# Patient Record
Sex: MALE | Race: WHITE | NOT HISPANIC OR LATINO | Employment: UNEMPLOYED | URBAN - METROPOLITAN AREA
[De-identification: names, ages, dates, MRNs, and addresses within clinical notes are randomized per-mention and may not be internally consistent; named-entity substitution may affect disease eponyms.]

---

## 2024-06-12 ENCOUNTER — APPOINTMENT (OUTPATIENT)
Dept: CT IMAGING | Facility: CLINIC | Age: 64
DRG: 064 | End: 2024-06-12
Attending: EMERGENCY MEDICINE
Payer: COMMERCIAL

## 2024-06-12 ENCOUNTER — HOSPITAL ENCOUNTER (INPATIENT)
Facility: CLINIC | Age: 64
LOS: 8 days | Discharge: NURSING, REHAB OR LONG-TERM CARE | DRG: 064 | End: 2024-06-20
Attending: EMERGENCY MEDICINE | Admitting: HOSPITALIST
Payer: COMMERCIAL

## 2024-06-12 DIAGNOSIS — R41.0 CONFUSION: ICD-10-CM

## 2024-06-12 DIAGNOSIS — I50.23 ACUTE ON CHRONIC SYSTOLIC HEART FAILURE (H): ICD-10-CM

## 2024-06-12 DIAGNOSIS — E74.39 GLUCOSE INTOLERANCE: ICD-10-CM

## 2024-06-12 DIAGNOSIS — I63.9 ACUTE CVA (CEREBROVASCULAR ACCIDENT) (H): ICD-10-CM

## 2024-06-12 DIAGNOSIS — I51.3 LV (LEFT VENTRICULAR) MURAL THROMBUS: ICD-10-CM

## 2024-06-12 DIAGNOSIS — E87.6 HYPOKALEMIA: ICD-10-CM

## 2024-06-12 DIAGNOSIS — I42.9 CARDIOMYOPATHY, UNSPECIFIED TYPE (H): Primary | ICD-10-CM

## 2024-06-12 DIAGNOSIS — H53.40 VISUAL FIELD CUT: ICD-10-CM

## 2024-06-12 DIAGNOSIS — R79.89 ELEVATED TROPONIN: ICD-10-CM

## 2024-06-12 LAB
ANION GAP SERPL CALCULATED.3IONS-SCNC: 17 MMOL/L (ref 7–15)
APTT PPP: 27 SECONDS (ref 22–38)
ATRIAL RATE - MUSE: 120 BPM
BASOPHILS # BLD AUTO: 0.1 10E3/UL (ref 0–0.2)
BASOPHILS NFR BLD AUTO: 0 %
BUN SERPL-MCNC: 21.2 MG/DL (ref 8–23)
CALCIUM SERPL-MCNC: 9.9 MG/DL (ref 8.8–10.2)
CHLORIDE SERPL-SCNC: 104 MMOL/L (ref 98–107)
CREAT SERPL-MCNC: 0.96 MG/DL (ref 0.67–1.17)
DEPRECATED HCO3 PLAS-SCNC: 23 MMOL/L (ref 22–29)
DIASTOLIC BLOOD PRESSURE - MUSE: NORMAL MMHG
EGFRCR SERPLBLD CKD-EPI 2021: 88 ML/MIN/1.73M2
EOSINOPHIL # BLD AUTO: 0 10E3/UL (ref 0–0.7)
EOSINOPHIL NFR BLD AUTO: 0 %
ERYTHROCYTE [DISTWIDTH] IN BLOOD BY AUTOMATED COUNT: 13.2 % (ref 10–15)
GLUCOSE BLDC GLUCOMTR-MCNC: 134 MG/DL (ref 70–99)
GLUCOSE BLDC GLUCOMTR-MCNC: 151 MG/DL (ref 70–99)
GLUCOSE BLDC GLUCOMTR-MCNC: 161 MG/DL (ref 70–99)
GLUCOSE SERPL-MCNC: 171 MG/DL (ref 70–99)
HBA1C MFR BLD: 6.4 %
HCT VFR BLD AUTO: 53.3 % (ref 40–53)
HGB BLD-MCNC: 17.3 G/DL (ref 13.3–17.7)
IMM GRANULOCYTES # BLD: 0 10E3/UL
IMM GRANULOCYTES NFR BLD: 0 %
INR PPP: 1.14 (ref 0.85–1.15)
INTERPRETATION ECG - MUSE: NORMAL
LYMPHOCYTES # BLD AUTO: 1.1 10E3/UL (ref 0.8–5.3)
LYMPHOCYTES NFR BLD AUTO: 10 %
MCH RBC QN AUTO: 31 PG (ref 26.5–33)
MCHC RBC AUTO-ENTMCNC: 32.5 G/DL (ref 31.5–36.5)
MCV RBC AUTO: 96 FL (ref 78–100)
MONOCYTES # BLD AUTO: 1.4 10E3/UL (ref 0–1.3)
MONOCYTES NFR BLD AUTO: 12 %
NEUTROPHILS # BLD AUTO: 8.8 10E3/UL (ref 1.6–8.3)
NEUTROPHILS NFR BLD AUTO: 77 %
NRBC # BLD AUTO: 0 10E3/UL
NRBC BLD AUTO-RTO: 0 /100
NT-PROBNP SERPL-MCNC: ABNORMAL PG/ML (ref 0–900)
P AXIS - MUSE: 64 DEGREES
PLATELET # BLD AUTO: 377 10E3/UL (ref 150–450)
POTASSIUM SERPL-SCNC: 3.8 MMOL/L (ref 3.4–5.3)
PR INTERVAL - MUSE: 152 MS
QRS DURATION - MUSE: 86 MS
QT - MUSE: 342 MS
QTC - MUSE: 483 MS
R AXIS - MUSE: 7 DEGREES
RBC # BLD AUTO: 5.58 10E6/UL (ref 4.4–5.9)
SODIUM SERPL-SCNC: 144 MMOL/L (ref 135–145)
SYSTOLIC BLOOD PRESSURE - MUSE: NORMAL MMHG
T AXIS - MUSE: 63 DEGREES
TROPONIN T SERPL HS-MCNC: 57 NG/L
TROPONIN T SERPL HS-MCNC: 78 NG/L
TROPONIN T SERPL HS-MCNC: 83 NG/L
TROPONIN T SERPL HS-MCNC: 83 NG/L
VENTRICULAR RATE- MUSE: 120 BPM
WBC # BLD AUTO: 11.4 10E3/UL (ref 4–11)

## 2024-06-12 PROCEDURE — 70450 CT HEAD/BRAIN W/O DYE: CPT

## 2024-06-12 PROCEDURE — 85730 THROMBOPLASTIN TIME PARTIAL: CPT | Performed by: EMERGENCY MEDICINE

## 2024-06-12 PROCEDURE — 0042T CT HEAD PERFUSION W CONTRAST: CPT

## 2024-06-12 PROCEDURE — 250N000012 HC RX MED GY IP 250 OP 636 PS 637: Performed by: PHYSICIAN ASSISTANT

## 2024-06-12 PROCEDURE — 80048 BASIC METABOLIC PNL TOTAL CA: CPT | Performed by: EMERGENCY MEDICINE

## 2024-06-12 PROCEDURE — 93005 ELECTROCARDIOGRAM TRACING: CPT

## 2024-06-12 PROCEDURE — 83036 HEMOGLOBIN GLYCOSYLATED A1C: CPT | Performed by: PHYSICIAN ASSISTANT

## 2024-06-12 PROCEDURE — 84484 ASSAY OF TROPONIN QUANT: CPT | Performed by: PHYSICIAN ASSISTANT

## 2024-06-12 PROCEDURE — 250N000013 HC RX MED GY IP 250 OP 250 PS 637: Performed by: EMERGENCY MEDICINE

## 2024-06-12 PROCEDURE — 120N000001 HC R&B MED SURG/OB

## 2024-06-12 PROCEDURE — 84484 ASSAY OF TROPONIN QUANT: CPT | Performed by: EMERGENCY MEDICINE

## 2024-06-12 PROCEDURE — 36415 COLL VENOUS BLD VENIPUNCTURE: CPT | Performed by: EMERGENCY MEDICINE

## 2024-06-12 PROCEDURE — 70496 CT ANGIOGRAPHY HEAD: CPT

## 2024-06-12 PROCEDURE — 99291 CRITICAL CARE FIRST HOUR: CPT | Mod: FS | Performed by: PSYCHIATRY & NEUROLOGY

## 2024-06-12 PROCEDURE — 85610 PROTHROMBIN TIME: CPT | Performed by: EMERGENCY MEDICINE

## 2024-06-12 PROCEDURE — 36415 COLL VENOUS BLD VENIPUNCTURE: CPT | Performed by: PHYSICIAN ASSISTANT

## 2024-06-12 PROCEDURE — 82962 GLUCOSE BLOOD TEST: CPT

## 2024-06-12 PROCEDURE — 85004 AUTOMATED DIFF WBC COUNT: CPT | Performed by: EMERGENCY MEDICINE

## 2024-06-12 PROCEDURE — 83880 ASSAY OF NATRIURETIC PEPTIDE: CPT | Performed by: EMERGENCY MEDICINE

## 2024-06-12 PROCEDURE — 250N000009 HC RX 250: Performed by: EMERGENCY MEDICINE

## 2024-06-12 PROCEDURE — 99292 CRITICAL CARE ADDL 30 MIN: CPT | Mod: FS | Performed by: PSYCHIATRY & NEUROLOGY

## 2024-06-12 PROCEDURE — 250N000011 HC RX IP 250 OP 636: Performed by: EMERGENCY MEDICINE

## 2024-06-12 PROCEDURE — 99291 CRITICAL CARE FIRST HOUR: CPT | Mod: 25

## 2024-06-12 PROCEDURE — 99223 1ST HOSP IP/OBS HIGH 75: CPT | Mod: AI | Performed by: PHYSICIAN ASSISTANT

## 2024-06-12 RX ORDER — CLOPIDOGREL 300 MG/1
300 TABLET, FILM COATED ORAL ONCE
Status: COMPLETED | OUTPATIENT
Start: 2024-06-12 | End: 2024-06-12

## 2024-06-12 RX ORDER — LIDOCAINE 40 MG/G
CREAM TOPICAL
Status: DISCONTINUED | OUTPATIENT
Start: 2024-06-12 | End: 2024-06-20 | Stop reason: HOSPADM

## 2024-06-12 RX ORDER — DEXTROSE MONOHYDRATE 25 G/50ML
25-50 INJECTION, SOLUTION INTRAVENOUS
Status: DISCONTINUED | OUTPATIENT
Start: 2024-06-12 | End: 2024-06-20 | Stop reason: HOSPADM

## 2024-06-12 RX ORDER — ONDANSETRON 2 MG/ML
4 INJECTION INTRAMUSCULAR; INTRAVENOUS EVERY 6 HOURS PRN
Status: DISCONTINUED | OUTPATIENT
Start: 2024-06-12 | End: 2024-06-20 | Stop reason: HOSPADM

## 2024-06-12 RX ORDER — HYDRALAZINE HYDROCHLORIDE 20 MG/ML
10-20 INJECTION INTRAMUSCULAR; INTRAVENOUS
Status: DISCONTINUED | OUTPATIENT
Start: 2024-06-12 | End: 2024-06-20 | Stop reason: HOSPADM

## 2024-06-12 RX ORDER — ASPIRIN 300 MG/1
300 SUPPOSITORY RECTAL DAILY
Status: DISCONTINUED | OUTPATIENT
Start: 2024-06-13 | End: 2024-06-13

## 2024-06-12 RX ORDER — ACETAMINOPHEN 650 MG/1
650 SUPPOSITORY RECTAL EVERY 4 HOURS PRN
Status: DISCONTINUED | OUTPATIENT
Start: 2024-06-12 | End: 2024-06-20 | Stop reason: HOSPADM

## 2024-06-12 RX ORDER — NICOTINE POLACRILEX 4 MG
15-30 LOZENGE BUCCAL
Status: DISCONTINUED | OUTPATIENT
Start: 2024-06-12 | End: 2024-06-20 | Stop reason: HOSPADM

## 2024-06-12 RX ORDER — CLOPIDOGREL BISULFATE 75 MG/1
75 TABLET ORAL DAILY
Status: DISCONTINUED | OUTPATIENT
Start: 2024-06-13 | End: 2024-06-13

## 2024-06-12 RX ORDER — LABETALOL HYDROCHLORIDE 5 MG/ML
10-20 INJECTION, SOLUTION INTRAVENOUS EVERY 10 MIN PRN
Status: DISCONTINUED | OUTPATIENT
Start: 2024-06-12 | End: 2024-06-20 | Stop reason: HOSPADM

## 2024-06-12 RX ORDER — ONDANSETRON 4 MG/1
4 TABLET, ORALLY DISINTEGRATING ORAL EVERY 6 HOURS PRN
Status: DISCONTINUED | OUTPATIENT
Start: 2024-06-12 | End: 2024-06-20 | Stop reason: HOSPADM

## 2024-06-12 RX ORDER — IOPAMIDOL 755 MG/ML
117 INJECTION, SOLUTION INTRAVASCULAR ONCE
Status: DISCONTINUED | OUTPATIENT
Start: 2024-06-12 | End: 2024-06-12

## 2024-06-12 RX ORDER — ASPIRIN 325 MG
325 TABLET, DELAYED RELEASE (ENTERIC COATED) ORAL DAILY
Status: DISCONTINUED | OUTPATIENT
Start: 2024-06-13 | End: 2024-06-13

## 2024-06-12 RX ORDER — ASPIRIN 81 MG/1
324 TABLET, CHEWABLE ORAL DAILY
Status: DISCONTINUED | OUTPATIENT
Start: 2024-06-13 | End: 2024-06-13

## 2024-06-12 RX ORDER — ASPIRIN 325 MG
325 TABLET ORAL ONCE
Status: COMPLETED | OUTPATIENT
Start: 2024-06-12 | End: 2024-06-12

## 2024-06-12 RX ADMIN — SODIUM CHLORIDE 100 ML: 9 INJECTION, SOLUTION INTRAVENOUS at 14:19

## 2024-06-12 RX ADMIN — IOPAMIDOL 117 ML: 755 INJECTION, SOLUTION INTRAVENOUS at 14:19

## 2024-06-12 RX ADMIN — ASPIRIN 325 MG ORAL TABLET 325 MG: 325 PILL ORAL at 16:09

## 2024-06-12 RX ADMIN — CLOPIDOGREL BISULFATE 300 MG: 300 TABLET, FILM COATED ORAL at 16:09

## 2024-06-12 RX ADMIN — INSULIN ASPART 1 UNITS: 100 INJECTION, SOLUTION INTRAVENOUS; SUBCUTANEOUS at 21:01

## 2024-06-12 ASSESSMENT — ACTIVITIES OF DAILY LIVING (ADL)
ADLS_ACUITY_SCORE: 21
ADLS_ACUITY_SCORE: 18
ADLS_ACUITY_SCORE: 35
ADLS_ACUITY_SCORE: 18
ADLS_ACUITY_SCORE: 21
ADLS_ACUITY_SCORE: 35
ADLS_ACUITY_SCORE: 25
ADLS_ACUITY_SCORE: 24
ADLS_ACUITY_SCORE: 35
ADLS_ACUITY_SCORE: 35

## 2024-06-12 ASSESSMENT — COLUMBIA-SUICIDE SEVERITY RATING SCALE - C-SSRS
6. HAVE YOU EVER DONE ANYTHING, STARTED TO DO ANYTHING, OR PREPARED TO DO ANYTHING TO END YOUR LIFE?: NO
1. IN THE PAST MONTH, HAVE YOU WISHED YOU WERE DEAD OR WISHED YOU COULD GO TO SLEEP AND NOT WAKE UP?: NO
2. HAVE YOU ACTUALLY HAD ANY THOUGHTS OF KILLING YOURSELF IN THE PAST MONTH?: NO

## 2024-06-12 NOTE — PLAN OF CARE
RECEIVING UNIT ED HANDOFF REVIEW    ED Nurse Handoff Report was reviewed by: Marcia Mares RN on June 12, 2024 at 5:03 PM

## 2024-06-12 NOTE — ED TRIAGE NOTES
Patient BIBA. Patient was found sitting in his car outside his home. Patient is an  and took a phone call from a client. Client who knows patient noted patient to be confused and call 911.     Unknown last known well. Bottle of benadryl with multiple tabs found in patient's car. Patient confused, unable to answer many questions and has right sided facial droop.

## 2024-06-12 NOTE — ED NOTES
[facility]  ED Nurse Handoff Report    ED Chief complaint: Altered Mental Status and Stroke Symptoms      ED Diagnosis:   Final diagnoses:   None       Code Status: Full Code    Allergies: No Known Allergies    Patient Story: Patient BIBA. Patient was found sitting in his car outside his home. Patient is an  and took a phone call from a client. Client who knows patient noted patient to be confused and call 911.      Unknown last known well. Bottle of benadryl with multiple tabs found in patient's car. Patient confused, unable to answer many questions and has right sided facial droop.   Focused Assessment:  slured speech    Treatments and/or interventions provided:   Labs Ordered and Resulted from Time of ED Arrival to Time of ED Departure   BASIC METABOLIC PANEL - Abnormal       Result Value    Sodium 144      Potassium 3.8      Chloride 104      Carbon Dioxide (CO2) 23      Anion Gap 17 (*)     Urea Nitrogen 21.2      Creatinine 0.96      GFR Estimate 88      Calcium 9.9      Glucose 171 (*)    TROPONIN T, HIGH SENSITIVITY - Abnormal    Troponin T, High Sensitivity 57 (*)    CBC WITH PLATELETS AND DIFFERENTIAL - Abnormal    WBC Count 11.4 (*)     RBC Count 5.58      Hemoglobin 17.3      Hematocrit 53.3 (*)     MCV 96      MCH 31.0      MCHC 32.5      RDW 13.2      Platelet Count 377      % Neutrophils 77      % Lymphocytes 10      % Monocytes 12      % Eosinophils 0      % Basophils 0      % Immature Granulocytes 0      NRBCs per 100 WBC 0      Absolute Neutrophils 8.8 (*)     Absolute Lymphocytes 1.1      Absolute Monocytes 1.4 (*)     Absolute Eosinophils 0.0      Absolute Basophils 0.1      Absolute Immature Granulocytes 0.0      Absolute NRBCs 0.0     GLUCOSE BY METER - Abnormal    GLUCOSE BY METER POCT 134 (*)    INR - Normal    INR 1.14     PARTIAL THROMBOPLASTIN TIME - Normal    aPTT 27     GLUCOSE MONITOR NURSING POCT      Medications   aspirin (ASA) tablet 325 mg (has  no administration in time range)   clopidogrel (PLAVIX) tablet 300 mg (has no administration in time range)   iopamidol (ISOVUE-370) solution 117 mL (117 mLs Intravenous $Given 6/12/24 7406)     And   sodium chloride 0.9 % bag for CT scan flush use (100 mLs As instructed $Given 6/12/24 1417)      CT Head Perfusion w Contrast - For Tier 2 Stroke   Final Result   IMPRESSION: There is a late arrival of the contrast bolus to the   posterolateral aspect of the left cerebral hemisphere involving   portions of the left temporal and left occipital lobes. This   corresponds to an area of decreased cerebral blood flow and decreased   cerebral blood volume suggesting infarct. There is questionable loss   of gray-white differentiation at the same location on head CT but head   CT is mildly limited by artifact. No other perfusion defects.         Radiation dose for this scan was reduced using automated exposure   control, adjustment of the mA and/or kV according to patient size, or   iterative reconstruction technique      JONATHON OBRIEN MD            SYSTEM ID:  [id number]      CTA Head Neck with Contrast   Final Result   Addendum (preliminary) 1 of 1   Addendum: After reviewing the perfusion images that demonstrate a   probable infarct at the temporo-occipital junction on the left, there   is decreased arborization of the distal M3 branches of the inferior   division of the left middle cerebral artery possibly representing   multiple thromboembolic occlusions.      This imaging study was discussed with the ordering provider, Dr. TAMMY VANESSA, by Dr. Obrien on 6/12/2024 2:45 PM.      JONATHON OBRIEN MD            SYSTEM ID:  [id number]      Final   IMPRESSION:   1. Age-indeterminate lack of flow in the left vertebral artery that is   of uncertain etiology. Acute occlusion cannot be excluded.   2. Mild atherosclerotic narrowing (less than 50% luminal diameter   stenosis) at the right internal carotid origin.   3. Otherwise,  normal neck and head CTA.         JONATHON ACOSTA MD            SYSTEM ID:  [id number]      CT Head w/o Contrast   Final Result   IMPRESSION: Diffuse cerebral volume loss and cerebral white matter   changes consistent with chronic small vessel ischemic disease. No   evidence for acute intracranial pathology.            JONATHON ACOSTA MD            SYSTEM ID:  [id number]         Patient's response to treatments and/or interventions: tolerated     To be done/followed up on inpatient unit:  na    Does this patient have any cognitive concerns?: Disoriented to time, Disoriented to place, Disoriented to situation, and Disorientation to person    Activity level - Baseline/Home:  Unknown  Activity Level - Current:   Unknown    Patient's Preferred language: English   Needed?: No    Isolation: None  Infection: Not Applicable  Patient tested for COVID 19 prior to admission: YES  Bariatric?: No    Vital Signs:   Vitals:    06/12/24 1406 06/12/24 1432 06/12/24 1442 06/12/24 1454   BP: (!) 190/129 (!) 169/122     Pulse: 118 117 112 113   Resp: 21 19 22 19   SpO2: 97% 95% 93% 96%   Weight: 127 kg (279 lb 15.8 oz)          Cardiac Rhythm:     Was the PSS-3 completed:   Yes  What interventions are required if any?               Family Comments:   OBS brochure/video discussed/provided to patient/family: Yes              Name of person given brochure if not patient:               Relationship to patient:     For the majority of the shift this patient's behavior was Green.   Behavioral interventions performed were .    ED NURSE PHONE NUMBER: [phone]

## 2024-06-12 NOTE — PROGRESS NOTES
"[facility]     Stroke Code Note          History of Present Illness     Chief Complaint: Altered Mental Status and Stroke Symptoms      Cirilo Gardner is a 63 year old male who we do not have known significant PMH for at this time. He was on the phone with someone this afternoon who noted him to be confused on the phone and notified EMS around 1330. In the ED he was noted to have mixed expressive/receptive language deficits and R visual field cut. On further questioning of the patient he is able to provide history that he woke up around 0400 feeling ok, around 0800 he felt that something wasn't right and by 1000 something was definitely not right. He is unable to further characterize what was feeling off.          Past Medical History     Stroke risk factors: unknown    Preadmission antithrombotic regimen: unknown    Modified Randolph Score (Pre-morbid)  0 - No symptoms.                   Assessment and Plan       1.  Suspected acute posterior L MCA infarct with symptoms of mixed expressive/receptive aphasia and R visual field cut     Intravenous Thrombolysis  Not given due to:   - unclear or unfavorable risk-benefit profile for extended window thrombolysis beyond the conventional 4.5 hour time window     Endovascular Treatment  Not initiated due to absence of proximal vessel occlusion     Plan:  - Use orderset: \"Ischemic Stroke Routine Admission\" or \"Ischemic Stroke No Thrombolytics/No Thrombectomy ICU Admission\"  - Place Neurology IP Stroke Consult order   - Neurochecks and Vital Signs every 4 hours   - Permissive HTN; goal SBP < 220 mmHg  - Load with DAPT now - aspirin 325 mg + Plavix 300 mg, start plavix 75 mg + aspirin 325 mg for secondary stroke prevention tomorrow  - Statin: atorvastatin 40 mg, adjustment pending LDL  - MRI Brain with and without contrast   - TTE  - Carotid ultrasound  - Telemetry, EKG  - Bedside Glucose Monitoring  - Nutrition: nursing to document bedside " "swallow prior to allowing oral intake  - A1c, Lipid Panel, Troponin x 3  - PT/OT/SLP  - Stroke Education  - Depression Screen  - Apnea screening questions  - Euthermia, Euglycemia     ___________________________________________________________________    Radha MAMI Haney  Vascular Neurology    To page me or covering stroke neurology team member, click here: [facility]  Choose \"On Call\" tab at top, then select \"NEUROLOGY/ALL SITES\" from middle drop-down box, press Enter, then look for \"stroke\" or \"telestroke\" for your site.  ___________________________________________________________________        Imaging/Labs   (personally reviewed CT, CTA, CTP)    CT head: Diffuse cerebral volume loss and cerebral white matter  changes consistent with chronic small vessel ischemic disease. No  evidence for acute intracranial pathology.    CTA head/neck: 1. Age-indeterminate lack of flow in the left vertebral artery that is  of uncertain etiology. Acute occlusion cannot be excluded.  2. Mild atherosclerotic narrowing (less than 50% luminal diameter  stenosis) at the right internal carotid origin. Soft plaque L ICA with mild stenosis.    CT Perfusion head: There is a late arrival of the contrast bolus to the  posterolateral aspect of the left cerebral hemisphere involving  portions of the left temporal and left occipital lobes. This  corresponds to an area of decreased cerebral blood flow and decreased  cerebral blood volume suggesting infarct. There is questionable loss  of gray-white differentiation at the same location on head CT but head  CT is mildly limited by artifact. No other perfusion defects.         Physical Examination     BP: (!) 169/122   Pulse: 113   Resp: 19           SpO2: 96 %       Weight: 127 kg (279 lb 15.8 oz)    Wt Readings from Last 2 Encounters:   06/12/24 127 kg (279 lb 15.8 oz)       General Exam  General:  patient lying in bed without any acute distress    HEENT:  normocephalic/atraumatic  Pulmonary:  no " respiratory distress    Neuro Exam  Mental Status:   alert, difficult to assess orientation due to aphasia but seems to understand he is in the hospital, unable to tell me the date/month, mixed expressive/receptive aphasia, difficulty with naming and repetition  Cranial Nerves:  PERRL, EOMI with normal smooth pursuit, facial sensation intact and symmetric, facial movements symmetric, hearing not formally tested but intact to conversation, no dysarthria, tongue protrusion midline, R visual field cut  Motor:  normal muscle tone and bulk, no abnormal movements, able to move all limbs spontaneously, strength 5/5 throughout upper and lower extremities, no pronator drift  Sensory:  light touch sensation intact and symmetric throughout upper and lower extremities, no extinction on double simultaneous stimulation   Coordination:  normal finger-to-nose and heel-to-shin bilaterally without dysmetria, rapid alternating movements symmetric  Station/Gait:  deferred        Stroke Scales       NIHSS  1a. Level of Consciousness 0-->Alert, keenly responsive   1b. LOC Questions 2-->Answers neither question correctly   1c. LOC Commands 1-->Performs one task correctly   2.   Best Gaze 0-->Normal   3.   Visual 2-->Complete hemianopia   4.   Facial Palsy 0-->Normal symmetrical movements   5a. Motor Arm, Left 0-->No drift, limb holds 90 (or 45) degrees for full 10 secs   5b. Motor Arm, Right 0-->No drift, limb holds 90 (or 45) degrees for full 10 secs   6a. Motor Leg, Left 0-->No drift, leg holds 30 degree position for full 5 secs   6b. Motor Leg, right 0-->No drift, leg holds 30 degree position for full 5 secs   7.   Limb Ataxia 0-->Absent   8.   Sensory 0-->Normal, no sensory loss   9.   Best Language 2-->Severe aphasia, all communication is through fragmentary expression, great need for inference, questioning, and guessing by the listener. Range of information that can be exchanged is limited, listener carries burden of. . . (see row  details)   10. Dysarthria 0-->Normal   11. Extinction and Inattention  0-->No abnormality   Total 7 (06/12/24 1503)            Labs     CBC  Lab Results   Component Value Date    HGB 17.3 06/12/2024    HCT 53.3 (H) 06/12/2024    WBC 11.4 (H) 06/12/2024     06/12/2024       BMP  Lab Results   Component Value Date     06/12/2024    POTASSIUM 3.8 06/12/2024    CHLORIDE 104 06/12/2024    CO2 23 06/12/2024    BUN 21.2 06/12/2024    CR 0.96 06/12/2024     (H) 06/12/2024    PARISH 9.9 06/12/2024       INR  INR   Date Value Ref Range Status   06/12/2024 1.14 0.85 - 1.15 Final       Data   Stroke Code Data  (for stroke code without tele)  Stroke code activated 06/12/24  1402   First stroke provider response 06/12/24  1403   Last known normal 06/12/24  0400   Time of discovery (or onset of symptoms) 06/12/24  1330   Head CT read by Stroke Neuro Provider 06/12/24  1417   Was stroke code de-escalated? Yes  06/12/24  1440        Clinically Significant Risk Factors Present on Admission                                        Time Spent on this Encounter   Billing: I personally examined and evaluated the patient today. At the time of my evaluation and management the patient was critical condition today due to stroke code. I personally managed history, exam, chart and imaging review, discussion with patient/care team. Key decisions made today included no acute stroke treatment, admit for stroke eval. I spent a total of 70 minutes providing critical care services, evaluating the patient, directing care and reviewing laboratory values and radiologic reports.

## 2024-06-12 NOTE — ED NOTES
[facility]  ED Nurse Handoff Report    ED Chief complaint: Altered Mental Status and Stroke Symptoms      ED Diagnosis:   Final diagnoses:   Acute CVA (cerebrovascular accident) (H)   Confusion   Visual field cut - Right side   Elevated troponin       Code Status: Full Code    Allergies: No Known Allergies    Patient Story: Cirilo Gardner is a 63 year old male who presents to the emergency department for evaluation of Altered mental status. EMS reports a contractor calling in the patient after a brief phone call where he noticed the patient speaking differently. Upon arrival, They found the patient inside his car, outside his home, and unable to identify his identity or address. They measured his blood glucose at 142, blood pressure 170/100, and heart rate 117 bpm. The patient denies any headaches. Of note the patient lives alone and denies taking any medications or having a medical history.  The patient's history is very sketchy because of his memory loss and confusion.  We have no medical records on this patient.   Focused Assessment:    Labs Ordered and Resulted from Time of ED Arrival to Time of ED Departure   BASIC METABOLIC PANEL - Abnormal       Result Value    Sodium 144      Potassium 3.8      Chloride 104      Carbon Dioxide (CO2) 23      Anion Gap 17 (*)     Urea Nitrogen 21.2      Creatinine 0.96      GFR Estimate 88      Calcium 9.9      Glucose 171 (*)    TROPONIN T, HIGH SENSITIVITY - Abnormal    Troponin T, High Sensitivity 57 (*)    CBC WITH PLATELETS AND DIFFERENTIAL - Abnormal    WBC Count 11.4 (*)     RBC Count 5.58      Hemoglobin 17.3      Hematocrit 53.3 (*)     MCV 96      MCH 31.0      MCHC 32.5      RDW 13.2      Platelet Count 377      % Neutrophils 77      % Lymphocytes 10      % Monocytes 12      % Eosinophils 0      % Basophils 0      % Immature Granulocytes 0      NRBCs per 100 WBC 0      Absolute Neutrophils 8.8 (*)     Absolute Lymphocytes 1.1      Absolute  Monocytes 1.4 (*)     Absolute Eosinophils 0.0      Absolute Basophils 0.1      Absolute Immature Granulocytes 0.0      Absolute NRBCs 0.0     GLUCOSE BY METER - Abnormal    GLUCOSE BY METER POCT 134 (*)    NT PROBNP INPATIENT - Abnormal    N terminal Pro BNP Inpatient 12,210 (*)    INR - Normal    INR 1.14     PARTIAL THROMBOPLASTIN TIME - Normal    aPTT 27     GLUCOSE MONITOR NURSING POCT     Medications   iopamidol (ISOVUE-370) solution 117 mL (117 mLs Intravenous $Given 6/12/24 2029)     And   sodium chloride 0.9 % bag for CT scan flush use (100 mLs As instructed $Given 6/12/24 1419)   aspirin (ASA) tablet 325 mg (325 mg Oral $Given 6/12/24 1609)   clopidogrel (PLAVIX) tablet 300 mg (300 mg Oral $Given 6/12/24 1609)     CT Head Perfusion w Contrast - For Tier 2 Stroke   Final Result   IMPRESSION: There is a late arrival of the contrast bolus to the   posterolateral aspect of the left cerebral hemisphere involving   portions of the left temporal and left occipital lobes. This   corresponds to an area of decreased cerebral blood flow and decreased   cerebral blood volume suggesting infarct. There is questionable loss   of gray-white differentiation at the same location on head CT but head   CT is mildly limited by artifact. No other perfusion defects.         Radiation dose for this scan was reduced using automated exposure   control, adjustment of the mA and/or kV according to patient size, or   iterative reconstruction technique      JONATHON ACOSTA MD            SYSTEM ID:  [id number]      CTA Head Neck with Contrast   Final Result   Addendum (preliminary) 1 of 1   Addendum: After reviewing the perfusion images that demonstrate a   probable infarct at the temporo-occipital junction on the left, there   is decreased arborization of the distal M3 branches of the inferior   division of the left middle cerebral artery possibly representing   multiple thromboembolic occlusions.      This imaging study was discussed  with the ordering provider, Dr. TAMMY VANESSA, by Dr. Obrien on 6/12/2024 2:45 PM.      JONATHON OBRIEN MD            SYSTEM ID:  [id number]      Final   IMPRESSION:   1. Age-indeterminate lack of flow in the left vertebral artery that is   of uncertain etiology. Acute occlusion cannot be excluded.   2. Mild atherosclerotic narrowing (less than 50% luminal diameter   stenosis) at the right internal carotid origin.   3. Otherwise, normal neck and head CTA.         JONATHON OBRIEN MD            SYSTEM ID:  [id number]      CT Head w/o Contrast   Final Result   IMPRESSION: Diffuse cerebral volume loss and cerebral white matter   changes consistent with chronic small vessel ischemic disease. No   evidence for acute intracranial pathology.            JONATHON OBRIEN MD            SYSTEM ID:  [id number]                Does this patient have any cognitive concerns?: Disoriented to time, Disoriented to place, Disoriented to situation, and Disorientation to person    Activity level - Baseline/Home:  Independent  Activity Level - Current:   Stand with assist x2    Patient's Preferred language: English   Needed?: No    Isolation: None  Infection: Not Applicable  Patient tested for COVID 19 prior to admission: none  Bariatric?: No    Vital Signs:   Vitals:    06/12/24 1432 06/12/24 1442 06/12/24 1454 06/12/24 1600   BP: (!) 169/122   (!) 161/117   Pulse: 117 112 113 112   Resp: 19 22 19 13   SpO2: 95% 93% 96% 93%   Weight:           Cardiac Rhythm:     Was the PSS-3 completed:   Yes  What interventions are required if any?               Family Comments: none      For the majority of the shift this patient's behavior was Green.   Behavioral interventions performed were none.    ED NURSE PHONE NUMBER: [id number]

## 2024-06-12 NOTE — ED PROVIDER NOTES
Emergency Department Note      History of Present Illness     Chief Complaint  Altered Mental Status and Stroke Symptoms    HPI  Cirilo Gardner is a 63 year old male who presents to the emergency department for evaluation of Altered mental status. EMS reports a contractor calling in the patient after a brief phone call where he noticed the patient speaking differently. Upon arrival, They found the patient inside his car, outside his home, and unable to identify his identity or address. They measured his blood glucose at 142, blood pressure 170/100, and heart rate 117 bpm. The patient denies any headaches. Of note the patient lives alone and denies taking any medications or having a medical history.  The patient's history is very sketchy because of his memory loss and confusion.  We have no medical records on this patient.    Independent Historian  EMS as above     Review of External Notes  none  Past Medical History   Medical History and Problem List  No past medical history on file.    Medications  No current outpatient medications on file.      Surgical History   No past surgical history on file.  Physical Exam   Patient Vitals for the past 24 hrs:   BP Pulse Resp SpO2 Weight   06/12/24 1600 (!) 161/117 112 13 93 % --   06/12/24 1454 -- 113 19 96 % --   06/12/24 1442 -- 112 22 93 % --   06/12/24 1432 (!) 169/122 117 19 95 % --   06/12/24 1406 (!) 190/129 118 21 97 % 127 kg (279 lb 15.8 oz)     Physical Exam  Nursing note and vitals reviewed.    Constitutional:  Appears comfortable but confused  HENT:    Nose normal.  No discharge.      Oral mucosa is moist.  Eyes:    Conjunctivae are normal without injection.  Pupils are equal.  Cardiovascular:  Normal rate, regular rhythm with normal S1 and S2.      Normal heart sounds and peripheral pulses 2+ and equal.    Pulmonary:  Effort normal and breath sounds clear to auscultation bilaterally.     No respiratory distress.  No stridor.     No wheezes. No rales.      GI:    Soft. No distension and no mass. No tenderness.   Musculoskeletal:  Normal range of motion. No extremity deformity.     He does have 1+ bilateral lower extremity edema.  Neurological:   GCS 15. NIH stroke scale score 5. O X 1 but it took him a while to get his name out.  No sensory deficit. Normal strength in all extremities. No hand drift. No leg drift.   Visual fields reveal what appears to be a visual field defect on the right lateral visual field.  When he looks around he turns his head to try to look to the right rather than moving his eyes to the right which she can do but there seems to be a little more left deviation.. No diplopia.  Mild right facial droop. No slurred speech.   Speech is normal but he is confused.  Skin:    Skin is warm and dry. No rash noted. No diaphoresis.      No erythema. No pallor.  No lesions.  Psychiatric:   Behavior is normal. Appropriate mood and affect.     Judgment and thought content normal.   .  Diagnostics   Lab Results   Labs Ordered and Resulted from Time of ED Arrival to Time of ED Departure   BASIC METABOLIC PANEL - Abnormal       Result Value    Sodium 144      Potassium 3.8      Chloride 104      Carbon Dioxide (CO2) 23      Anion Gap 17 (*)     Urea Nitrogen 21.2      Creatinine 0.96      GFR Estimate 88      Calcium 9.9      Glucose 171 (*)    TROPONIN T, HIGH SENSITIVITY - Abnormal    Troponin T, High Sensitivity 57 (*)    CBC WITH PLATELETS AND DIFFERENTIAL - Abnormal    WBC Count 11.4 (*)     RBC Count 5.58      Hemoglobin 17.3      Hematocrit 53.3 (*)     MCV 96      MCH 31.0      MCHC 32.5      RDW 13.2      Platelet Count 377      % Neutrophils 77      % Lymphocytes 10      % Monocytes 12      % Eosinophils 0      % Basophils 0      % Immature Granulocytes 0      NRBCs per 100 WBC 0      Absolute Neutrophils 8.8 (*)     Absolute Lymphocytes 1.1      Absolute Monocytes 1.4 (*)     Absolute Eosinophils 0.0      Absolute Basophils 0.1      Absolute Immature  Granulocytes 0.0      Absolute NRBCs 0.0     GLUCOSE BY METER - Abnormal    GLUCOSE BY METER POCT 134 (*)    NT PROBNP INPATIENT - Abnormal    N terminal Pro BNP Inpatient 12,210 (*)    INR - Normal    INR 1.14     PARTIAL THROMBOPLASTIN TIME - Normal    aPTT 27     GLUCOSE MONITOR NURSING POCT     Imaging  CT Head Perfusion w Contrast - For Tier 2 Stroke   Final Result   IMPRESSION: There is a late arrival of the contrast bolus to the   posterolateral aspect of the left cerebral hemisphere involving   portions of the left temporal and left occipital lobes. This   corresponds to an area of decreased cerebral blood flow and decreased   cerebral blood volume suggesting infarct. There is questionable loss   of gray-white differentiation at the same location on head CT but head   CT is mildly limited by artifact. No other perfusion defects.         Radiation dose for this scan was reduced using automated exposure   control, adjustment of the mA and/or kV according to patient size, or   iterative reconstruction technique      JONATHON OBRIEN MD            SYSTEM ID:  [id number]      CTA Head Neck with Contrast   Final Result   Addendum (preliminary) 1 of 1   Addendum: After reviewing the perfusion images that demonstrate a   probable infarct at the temporo-occipital junction on the left, there   is decreased arborization of the distal M3 branches of the inferior   division of the left middle cerebral artery possibly representing   multiple thromboembolic occlusions.      This imaging study was discussed with the ordering provider, Dr. TAMMY VANESSA, by Dr. Obrien on 6/12/2024 2:45 PM.      JONATHON OBRIEN MD            SYSTEM ID:  [id number]      Final   IMPRESSION:   1. Age-indeterminate lack of flow in the left vertebral artery that is   of uncertain etiology. Acute occlusion cannot be excluded.   2. Mild atherosclerotic narrowing (less than 50% luminal diameter   stenosis) at the right internal carotid origin.   3.  Otherwise, normal neck and head CTA.         JONATHON ACOSTA MD            SYSTEM ID:  [id number]      CT Head w/o Contrast   Final Result   IMPRESSION: Diffuse cerebral volume loss and cerebral white matter   changes consistent with chronic small vessel ischemic disease. No   evidence for acute intracranial pathology.            JONATHON ACOSTA MD            SYSTEM ID:  [id number]        EKG   ECG taken at 1403, ECG read at 1405  Sinus tachycardia with occasional premature ventricular complexes  Possible left atrial enlargement   Nonspecific t wave abnormality    Rate 120 bpm. MT interval 152 ms. QRS duration 86 ms. QT/QTc 342/483 ms. P-R-T axes 64 7 63.    Independent Interpretation  None  ED Course    Medications Administered  Medications   iopamidol (ISOVUE-370) solution 117 mL (117 mLs Intravenous $Given 6/12/24 1419)     And   sodium chloride 0.9 % bag for CT scan flush use (100 mLs As instructed $Given 6/12/24 1419)   aspirin (ASA) tablet 325 mg (325 mg Oral $Given 6/12/24 1609)   clopidogrel (PLAVIX) tablet 300 mg (300 mg Oral $Given 6/12/24 1609)       Procedures  Procedures     Discussion of Management  Admitting Hospitalist, Dr. Saavedra  Neurology, 1406 Radha Haney PA-C     Social Determinants of Health adding to complexity of care  None    ED Course  ED Course as of 06/12/24 1625   Wed Jun 12, 2024   1355 I obtained history and examined the patient as noted above     1400 I called a tier two stroke   1403 I spoke with stroke neuro about the patient     Medical Decision Making / Diagnosis   CMS Diagnoses: The patient has stroke symptoms:         ED Stroke specific documentation           NIHSS PDF     Patient last known well time: unknown  ED Provider first to bedside at: 1355  CT Results received at: 1415    Thrombolytics:   Not given due to: Outside the window.      If treating with thrombolytics: Ensure SBP<180 and DBP<105 prior to treatment with thrombolytics.  Administering thrombolytics after treatment  with IV labetalol, hydralazine, or nicardipine is reasonable once BP control is established.    Endovascular Retrieval:  Not initiated due to absence of proximal vessel occlusion    [employer] Stroke Scale (Baseline)  Time Performed: 1405     Score    Level of consciousness: (0)   Alert, keenly responsive    LOC questions: (1)   Answers one question correctly    LOC commands: (0)   Performs both tasks correctly    Best gaze: (1)   Partial gaze palsy    Visual: (1)   Partial hemianopia    Facial palsy: (1)   Minor paralysis (flat nasolabial fold, smile asymmetry)    Motor arm (left): (0)   No drift    Motor arm (right): (0)   No drift    Motor leg (left): (0)   No drift    Motor leg (right): (0)   No drift    Limb ataxia: (0)   Absent    Sensory: (0)   Normal- no sensory loss    Best language: (0)   Normal- no aphasia    Dysarthria: (0)   Normal    Extinction and inattention: (1)   Visual, tacile, auditory, spatial, person inattention        Total Score:  5        Stroke Mimics were considered (including migraine headache, seizure disorder, hypoglycemia (or hyperglycemia), head or spinal trauma, CNS infection, Toxin ingestion and shock state (e.g. sepsis) .      MIPS     None    MDM  Cirilo Gardner is a 63 year old male who was found in his car confused and brought into the ER.  It is unclear when he was last known well, I thought there was a slight facial droop on the right and he seemed to have a little bit of a visual field defect in the right lateral visual field.  I called a tier 2 code stroke because it sounds like he may have been fine last night.  CT and CTA showed evidence of a perfusion defect and possible acute or subacute stroke in the left hemisphere and there is what appears to be severe disease in the left carotid.  The patient was outside the window and did not have a major vessel occlusion that could be amenable to thrombectomy.  Stroke neuro was here and they recommended  Plavix and aspirin and will get an MRI.  He will need carotid ultrasounds as well to quantify the amount of blockage in the left carotid.  Cardiac echo as well.  We do not have any medical history on this patient although he says he is not on any medications.  His troponin was up at 57 and his BNP is quite high at 12,210.  He does have significant 1+ leg edema, electrolytes were normal white count was elevated slightly at 11.4 but the rest of the CBC was unremarkable.  His blood sugar was 134.  He was in sinus rhythm on EKG.  This patient will be admitted to neuro telemetry for with further workup per stroke neuro and hospitalist which was Dr. Saavedra.  He will need further evaluation for the elevated BNP and troponin with serial troponins and a cardiac echo should be helpful as well.  He will likely need diuresis.  Blood pressure is high but will allow permissive hypertension.    Critical care time minus procedures: 50 minutes    Disposition  The patient was admitted to the hospital.     ICD-10 Codes:    ICD-10-CM    1. Acute CVA (cerebrovascular accident) (H)  I63.9       2. Confusion  R41.0       3. Visual field cut  H53.40     Right side      4. Elevated troponin  R79.89            Scribe Disclosure:  I, Lori Philippe, am serving as a scribe at 2:14 PM on 6/12/2024 to document services personally performed by Alicia Gallagher MD based on my observations and the provider's statements to me.     Scribe Disclosure:  I, Kristie Bailey, am serving as a scribe  at 3:39 PM on 6/12/2024 to document services personally performed by Alicia Gallagher MD based on my observations and the provider's statements to me.       Alicia Gallagher MD  06/12/24 3160

## 2024-06-12 NOTE — H&P
[facility]  History and Physical - Hospitalist Service       Date of Admission:  6/12/2024  PRIMARY CARE PROVIDER:    No primary care provider on file.    Assessment & Plan   Cirilo Gardner is a 63 year old male admitted on 6/12/2024 due to suspected acute posterior left MCA infarction with symptoms of mixed expressive/receptive aphasia and right visual filed cut.      No significant past medical history.      Discussed with Dr. Gallagher and reviewed ED notes.  Patient presented to the ED by ambulance due to altered mental status.  Patient was found sitting in his car outside of his home.  He had happened to be on the phone speaking with a client who became concerned as the patient seemed confused.  The client called 911.      In the ED the patient was noted to have expressive/receptive deficits and right visual field cut.  Code stroke was called.      Work-up in the ED included an EKG that showed sinus tachycardia with occasional PVCs and possible left atrial enlargement.  POCT glucose was 134.  BMP revealed an anion gap of 17 and glucose of 171 otherwise within normal limits.  CBC with differential revealed a WBC of 11.4, hematocrit of 53.3, absolute monocytes of 1.4, absolute neutrophils of 8.8 otherwise within normal limits.  INR was within normal limits at 1.14 as well as PTT at 27.  High-sensitivity troponin T was elevated at 57.      Imaging studies completed in the ED:  Head CT without contrast revealed diffuse cerebral volume loss and cerebral white matter changes consistent with chronic small vessel ischemic disease and no evidence of acute intracranial pathology.  Head/neck CTA with contrast revealed age indeterminate lack of flow in the left vertebral artery that is of uncertain etiology (acute occlusion cannot be excluded) and mild atherosclerotic narrowing (less than 50% luminal diameter stenosis) at the right internal carotid origin with subsequent addendum added  indicating probable infarct of the tempo-occipital junction on the left (decreased able ration of the distal M3 branches of the inferior division of the left middle cerebral artery possibly representing multiple thromboembolic occlusions).  Head perfusion CT study with contrast revealed a late arrival of the contrast bolus to the posterior lateral aspect of the left cerebral hemisphere involving portions of the left temporal and left occipital lobes (corresponds to an area of decreased verbal blood flow and decrease cerebral blood volume suggesting infarction), questionable loss of gray-white differentiation at the same location on head CT but head CT is mildly limited due to artifact otherwise no other perfusion defects noted.    Suspected acute posterior left MCA infarction   Mixed expressive/receptive aphasia  Right visual filed cut  - Stroke Neurology consult requested.  - Vital signs and neuro checks every 4 hours.    - Brain MRI w and w/o contrast.    - Monitor on telemetry.    - ECHO w or w/o bubble study.    - Trend troponin.    - Check A1c and Lipid panel.    - NPO; can advance diet if patient passes bedside dysphagia screen.    - PT/OT/SLP consults requested.    - SW/CM consult requested.    - Allow for permissive hypertension (goal SBP LESS THAN 220) for the next 24 hours.    - PRN IV antihypertensives available (hydralazine, labetalol).  - Loading dose of ASA (325 mg) and Plavix (300 mg) ordered in the ED.   --Will start daily  mg/d and Plavix 75 mg/d in the AM of 6/13.    - Atorvastatin 40 mg at bedtime ordered to start the evening of 6/13.      Troponin elevation  *Suspect secondary to demand due to acute CVA.  - Trend troponin.    - Monitor on telemetry.    - ECHO ordered.      Bilateral pitting lower extremity edema  Elevated ProBNP  *ProBNP resulted after assessment of the patient and elevated at 12,210.    *When asked, the patient reported that the swelling of the legs and feet began 3-4  days prior to presentation.    - ECHO.    - Currently allowing for permissive HTN; will await ECHO results and potentially start diuresis.      Leukocytosis, mild  *Suspect secondary to stress demargination.    - CBC ordered for the AM.      Hyperglycemia  - Check A1c.    - Medium sliding scale insulin every 4 hours while NPO.    - Hypoglycemic protocol in place.      Clinically Significant Risk Factors Present on Admission          Diet: NPO for Medical/Clinical Reasons Except for: No Exceptions    DVT Prophylaxis: Pneumatic Compression Devices  Ravi Catheter: Not present  Lines: None     Cardiac Monitoring: Ordered  Code Status: FULL CODE         Disposition Plan   Inpatient status.  Anticipate greater than 2 evening hospitalization while undergoing continued work-up/management of suspected acute CVA, elevated troponin, bilateral LE edema with elevated ProBNP.      Medically Ready for Discharge: Anticipated in 2-4 Days    The patient's care was discussed with the Patient and Dr. Gallagher .    The patient has been discussed with Dr. Saavedra, who agrees with the assessment and plan at this time.    Cameron Frederick PA-C  [facility]  Securely message with the Vocera Web Console (learn more here)    ______________________________________________________________________    Chief Complaint   Altered mental status    History is obtained from Dr. Gallagher, the patient and EMR.      History of Present Illness   Cirilo Gardner is a 63 year old male admitted on 6/12/2024 due to suspected acute posterior left MCA infarction with symptoms of mixed expressive/receptive aphasia and right visual filed cut.      No significant past medical history.      Discussed with Dr. Gallagher and reviewed ED notes.  Patient presented to the ED by ambulance due to altered mental status.  Patient was found sitting in his car outside of his home.  He had happened to be on the phone speaking with a client who  became concerned as the patient seemed confused.  The client called 911.      In the ED the patient was noted to have expressive/receptive deficits and right visual field cut.  Code stroke was called.      Work-up in the ED included an EKG that showed sinus tachycardia with occasional PVCs and possible left atrial enlargement.  POCT glucose was 134.  BMP revealed an anion gap of 17 and glucose of 171 otherwise within normal limits.  CBC with differential revealed a WBC of 11.4, hematocrit of 53.3, absolute monocytes of 1.4, absolute neutrophils of 8.8 otherwise within normal limits.  INR was within normal limits at 1.14 as well as PTT at 27.  High-sensitivity troponin T was elevated at 57.      Imaging studies completed in the ED:  Head CT without contrast revealed diffuse cerebral volume loss and cerebral white matter changes consistent with chronic small vessel ischemic disease and no evidence of acute intracranial pathology.  Head/neck CTA with contrast revealed age indeterminate lack of flow in the left vertebral artery that is of uncertain etiology (acute occlusion cannot be excluded) and mild atherosclerotic narrowing (less than 50% luminal diameter stenosis) at the right internal carotid origin with subsequent addendum added indicating probable infarct of the tempo-occipital junction on the left (decreased able ration of the distal M3 branches of the inferior division of the left middle cerebral artery possibly representing multiple thromboembolic occlusions).  Head perfusion CT study with contrast revealed a late arrival of the contrast bolus to the posterior lateral aspect of the left cerebral hemisphere involving portions of the left temporal and left occipital lobes (corresponds to an area of decreased verbal blood flow and decrease cerebral blood volume suggesting infarction), questionable loss of gray-white differentiation at the same location on head CT but head CT is mildly limited due to artifact  "otherwise no other perfusion defects noted.    Patient was seen in the ED where he was resting somewhat comfortably on the gurney upon arrival.  During discussion with the patient appears he does have some receptive and expressive deficits.  He reported that he had just completed 1 project and was about to go to a second project when he became confused and then said he left and mind that he drove home.    Upon questioning, he indicated that he has had some increased fatigue.  He has noticed some weight gain but then mind that he has been eating a lot.  When asked about shortness of breath he had a difficult time answering but then indicated that he is older now and so things are more difficult.  He described having some lightheadedness and \"woozy sensation\"in the morning when he gets up however it resolves quickly.  He indicated that he is having speech changes currently.    Initially, patient denied any swelling of his lower extremities or anywhere else.  However, during physical examination patient was noted to have bilateral pitting lower extremity edema.  When patient was asked when this began he indicated it began 3 to 4 days prior to presentation.    Patient resides in his own house independently.  Initially he was unable to state the city and just indicated here and attempted to say [place].  He denies any history or current use of tobacco products.  He denies alcohol consumption.  He does not utilize recreational drugs.  He does not use a cane or walker.  He does not use a CPAP machine or supplemental oxygen.    Discussed and reviewed CODE STATUS and patient elected to be full code.      Past Medical History    I have reviewed this patient's medical history and updated it with pertinent information if needed.   No significant past medical history.      Prior to Admission Medications   None     Allergies   No Known Allergies    Physical Exam   Vital Signs:     BP: (!) 169/122 Pulse: 113   Resp: 19 SpO2: 96 " %      Weight: 279 lbs 15.75 oz    Constitutional: Awake, alert, cooperative, no apparent distress.    ENT: slight right sided facial droop otherwise normocephalic, without obvious abnormality, atraumatic, oral pharynx with moist mucus membranes.  Eyes extra occular movements intact.  Normal sclera.    Neck: Supple, symmetrical, trachea midline, no adenopathy.  Pulmonary: No increased work of breathing, fair air exchange but diminished at the bases, clear to auscultation bilaterally, no crackles or wheezing.  Cardiovascular: Regular rate and rhythm, normal S1 and S2, no S3 or S4, and no murmur noted.  GI: Normal bowel sounds, soft, non-distended, non-tender.    Skin/Integumen: Visualized skin appeared clear.  Neuro: Slight right sided facial droop.  Noted receptive/expressive deficits patient had difficulty following some commands and had to be shown (sticking tongue out, shrugging shoulders).    Psych:  Alert and oriented x 3. Normal affect.  Extremities: 1-2+ pitting bilateral lower extremity edema noted, and calves are non-tender to palpation bilaterally.     Medical Decision Making       Please see A&P for additional details of medical decision making.  GREATER THAN 75 MINUTES SPENT BY ME on the date of service doing chart review, history, exam, documentation & further activities per the note.       Data   Data reviewed today: I reviewed all medications, new labs and imaging results over the last 24 hours. I personally reviewed the EKG tracing showing Sinus tachycardia with PVC's .      I have personally reviewed the following data over the past 24 hrs:    11.4 (H)  \   17.3   / 377     144 104 21.2 /  171 (H)   3.8 23 0.96 \     Trop: 57 (H) BNP: 12,210 (H)     INR:  1.14 PTT:  27   D-dimer:  N/A Fibrinogen:  N/A       Imaging results reviewed over the past 24 hrs:   Recent Results (from the past 24 hour(s))   CT Head w/o Contrast    Narrative    CT OF THE HEAD WITHOUT CONTRAST June 12, 2024 2:16 PM      HISTORY: Altered mental status.    TECHNIQUE: 5 mm thick axial CT images of the head were acquired  without IV contrast material. Radiation dose for this scan was reduced  using automated exposure control, adjustment of the mA and/or kV  according to patient size, or iterative reconstruction technique.    COMPARISON: None.    FINDINGS: There is moderate diffuse cerebral volume loss. There are  subtle patchy areas of decreased density in the cerebral white matter  bilaterally that are consistent with sequela of chronic small vessel  ischemic disease.     The ventricles and basal cisterns are within normal limits in  configuration given the degree of cerebral volume loss. There is no  midline shift. There are no extra-axial fluid collections.     No intracranial hemorrhage, mass or recent infarct.    The visualized paranasal sinuses are well-aerated. There is no  mastoiditis. There are no fractures of the visualized bones.      Impression    IMPRESSION: Diffuse cerebral volume loss and cerebral white matter  changes consistent with chronic small vessel ischemic disease. No  evidence for acute intracranial pathology.        JONATHON OBRIEN MD         SYSTEM ID:  [id number]   CTA Head Neck with Contrast    Addendum: 6/12/2024    Addendum: After reviewing the perfusion images that demonstrate a  probable infarct at the temporo-occipital junction on the left, there  is decreased arborization of the distal M3 branches of the inferior  division of the left middle cerebral artery possibly representing  multiple thromboembolic occlusions.    This imaging study was discussed with the ordering provider, Dr. TAMMY VANESSA, by Dr. Obrien on 6/12/2024 2:45 PM.    JONATHON OBRIEN MD         SYSTEM ID:  [id number]      Narrative    CT ANGIOGRAM OF THE HEAD AND NECK WITHOUT AND WITH CONTRAST June 12, 2024 2:20 PM     HISTORY: Altered mental status.    TECHNIQUE: Precontrast localizing scans were followed by CT  angiography with an  injection of 67mL isovue-370 nonionic intravenous  contrast material with scans through the head and neck. Images were  transferred to a separate 3-D workstation where multiplanar  reformations and 3-D images were created. Estimates of carotid  stenoses are made relative to the distal internal carotid artery  diameters except as noted. Radiation dose for this scan was reduced  using automated exposure control, adjustment of the mA and/or kV  according to patient size, or iterative reconstruction technique.     COMPARISON: None.    FINDINGS:   Neck CTA: The common carotid arteries bilaterally are widely patent.  There is short segment mild narrowing (less than 50% luminal diameter  stenosis) at the right internal carotid origin that could be caused by  noncalcified plaque. The cervical internal carotid arteries  bilaterally are tortuous but are otherwise patent without additional  areas of narrowing. The left vertebral artery is not filling; this is  of uncertain etiology or age. Acute occlusion of the left vertebral  artery is not excluded. The right vertebral artery is patent without  stenosis.    Head CTA: The basilar, bilateral intracranial distal internal carotid,  bilateral anterior cerebral, bilateral middle cerebral and bilateral  posterior cerebral arteries are patent and unremarkable.      Impression    IMPRESSION:  1. Age-indeterminate lack of flow in the left vertebral artery that is  of uncertain etiology. Acute occlusion cannot be excluded.  2. Mild atherosclerotic narrowing (less than 50% luminal diameter  stenosis) at the right internal carotid origin.  3. Otherwise, normal neck and head CTA.      JONATHON ACOSTA MD         SYSTEM ID:  [id number]   CT Head Perfusion w Contrast - For Tier 2 Stroke    Narrative    CT BRAIN PERFUSION 6/12/2024 2:35 PM    COMPARISON: Head CT same day, head and neck CT angiogram same day.    HISTORY: Altered mental status.    TECHNIQUE: Time sequential axial CT images of the  head were acquired  during the administration of intravenous contrast (50mL Isovue-370).  CTA images of the [place] as well as color perfusion maps of  the brain were created from this time sequential axial source data.      Impression    IMPRESSION: There is a late arrival of the contrast bolus to the  posterolateral aspect of the left cerebral hemisphere involving  portions of the left temporal and left occipital lobes. This  corresponds to an area of decreased cerebral blood flow and decreased  cerebral blood volume suggesting infarct. There is questionable loss  of gray-white differentiation at the same location on head CT but head  CT is mildly limited by artifact. No other perfusion defects.      Radiation dose for this scan was reduced using automated exposure  control, adjustment of the mA and/or kV according to patient size, or  iterative reconstruction technique    JONATHON ACOSTA MD         SYSTEM ID:  [id number]

## 2024-06-13 ENCOUNTER — APPOINTMENT (OUTPATIENT)
Dept: SPEECH THERAPY | Facility: CLINIC | Age: 64
DRG: 064 | End: 2024-06-13
Attending: PHYSICIAN ASSISTANT
Payer: COMMERCIAL

## 2024-06-13 ENCOUNTER — APPOINTMENT (OUTPATIENT)
Dept: PHYSICAL THERAPY | Facility: CLINIC | Age: 64
DRG: 064 | End: 2024-06-13
Attending: PHYSICIAN ASSISTANT
Payer: COMMERCIAL

## 2024-06-13 ENCOUNTER — APPOINTMENT (OUTPATIENT)
Dept: OCCUPATIONAL THERAPY | Facility: CLINIC | Age: 64
DRG: 064 | End: 2024-06-13
Attending: PHYSICIAN ASSISTANT
Payer: COMMERCIAL

## 2024-06-13 ENCOUNTER — APPOINTMENT (OUTPATIENT)
Dept: CARDIOLOGY | Facility: CLINIC | Age: 64
DRG: 064 | End: 2024-06-13
Attending: PHYSICIAN ASSISTANT
Payer: COMMERCIAL

## 2024-06-13 LAB
ANION GAP SERPL CALCULATED.3IONS-SCNC: 15 MMOL/L (ref 7–15)
BUN SERPL-MCNC: 22.2 MG/DL (ref 8–23)
CALCIUM SERPL-MCNC: 9.7 MG/DL (ref 8.8–10.2)
CHLORIDE SERPL-SCNC: 104 MMOL/L (ref 98–107)
CHOLEST SERPL-MCNC: 135 MG/DL
CREAT SERPL-MCNC: 0.85 MG/DL (ref 0.67–1.17)
DEPRECATED HCO3 PLAS-SCNC: 21 MMOL/L (ref 22–29)
EGFRCR SERPLBLD CKD-EPI 2021: >90 ML/MIN/1.73M2
ERYTHROCYTE [DISTWIDTH] IN BLOOD BY AUTOMATED COUNT: 13.4 % (ref 10–15)
GLUCOSE BLDC GLUCOMTR-MCNC: 129 MG/DL (ref 70–99)
GLUCOSE BLDC GLUCOMTR-MCNC: 129 MG/DL (ref 70–99)
GLUCOSE BLDC GLUCOMTR-MCNC: 132 MG/DL (ref 70–99)
GLUCOSE BLDC GLUCOMTR-MCNC: 134 MG/DL (ref 70–99)
GLUCOSE BLDC GLUCOMTR-MCNC: 138 MG/DL (ref 70–99)
GLUCOSE BLDC GLUCOMTR-MCNC: 139 MG/DL (ref 70–99)
GLUCOSE BLDC GLUCOMTR-MCNC: 139 MG/DL (ref 70–99)
GLUCOSE BLDC GLUCOMTR-MCNC: 150 MG/DL (ref 70–99)
GLUCOSE SERPL-MCNC: 136 MG/DL (ref 70–99)
HCT VFR BLD AUTO: 50.2 % (ref 40–53)
HDLC SERPL-MCNC: 41 MG/DL
HGB BLD-MCNC: 16.1 G/DL (ref 13.3–17.7)
LDLC SERPL CALC-MCNC: 82 MG/DL
LVEF ECHO: NORMAL
MCH RBC QN AUTO: 30.6 PG (ref 26.5–33)
MCHC RBC AUTO-ENTMCNC: 32.1 G/DL (ref 31.5–36.5)
MCV RBC AUTO: 95 FL (ref 78–100)
NONHDLC SERPL-MCNC: 94 MG/DL
PLATELET # BLD AUTO: 349 10E3/UL (ref 150–450)
POTASSIUM SERPL-SCNC: 3.9 MMOL/L (ref 3.4–5.3)
RBC # BLD AUTO: 5.26 10E6/UL (ref 4.4–5.9)
SODIUM SERPL-SCNC: 140 MMOL/L (ref 135–145)
TRIGL SERPL-MCNC: 58 MG/DL
WBC # BLD AUTO: 9.8 10E3/UL (ref 4–11)

## 2024-06-13 PROCEDURE — 97165 OT EVAL LOW COMPLEX 30 MIN: CPT | Mod: GO

## 2024-06-13 PROCEDURE — 80048 BASIC METABOLIC PNL TOTAL CA: CPT | Performed by: PHYSICIAN ASSISTANT

## 2024-06-13 PROCEDURE — 120N000001 HC R&B MED SURG/OB

## 2024-06-13 PROCEDURE — 92610 EVALUATE SWALLOWING FUNCTION: CPT | Mod: GN | Performed by: SPEECH-LANGUAGE PATHOLOGIST

## 2024-06-13 PROCEDURE — 36415 COLL VENOUS BLD VENIPUNCTURE: CPT | Performed by: PHYSICIAN ASSISTANT

## 2024-06-13 PROCEDURE — 250N000011 HC RX IP 250 OP 636: Mod: JZ | Performed by: INTERNAL MEDICINE

## 2024-06-13 PROCEDURE — 250N000013 HC RX MED GY IP 250 OP 250 PS 637: Performed by: PHYSICIAN ASSISTANT

## 2024-06-13 PROCEDURE — 93306 TTE W/DOPPLER COMPLETE: CPT | Mod: 26 | Performed by: INTERNAL MEDICINE

## 2024-06-13 PROCEDURE — 83718 ASSAY OF LIPOPROTEIN: CPT | Performed by: PHYSICIAN ASSISTANT

## 2024-06-13 PROCEDURE — 99418 PROLNG IP/OBS E/M EA 15 MIN: CPT | Mod: FS | Performed by: PSYCHIATRY & NEUROLOGY

## 2024-06-13 PROCEDURE — 36415 COLL VENOUS BLD VENIPUNCTURE: CPT | Performed by: INTERNAL MEDICINE

## 2024-06-13 PROCEDURE — 97161 PT EVAL LOW COMPLEX 20 MIN: CPT | Mod: GP

## 2024-06-13 PROCEDURE — 85027 COMPLETE CBC AUTOMATED: CPT | Performed by: PHYSICIAN ASSISTANT

## 2024-06-13 PROCEDURE — 250N000013 HC RX MED GY IP 250 OP 250 PS 637: Performed by: INTERNAL MEDICINE

## 2024-06-13 PROCEDURE — 92526 ORAL FUNCTION THERAPY: CPT | Mod: GN | Performed by: SPEECH-LANGUAGE PATHOLOGIST

## 2024-06-13 PROCEDURE — C8929 TTE W OR WO FOL WCON,DOPPLER: HCPCS

## 2024-06-13 PROCEDURE — 97530 THERAPEUTIC ACTIVITIES: CPT | Mod: GP

## 2024-06-13 PROCEDURE — 85520 HEPARIN ASSAY: CPT | Performed by: INTERNAL MEDICINE

## 2024-06-13 PROCEDURE — 99233 SBSQ HOSP IP/OBS HIGH 50: CPT | Mod: FS | Performed by: PSYCHIATRY & NEUROLOGY

## 2024-06-13 PROCEDURE — 97116 GAIT TRAINING THERAPY: CPT | Mod: GP

## 2024-06-13 PROCEDURE — 97535 SELF CARE MNGMENT TRAINING: CPT | Mod: GO

## 2024-06-13 PROCEDURE — 255N000002 HC RX 255 OP 636: Performed by: HOSPITALIST

## 2024-06-13 PROCEDURE — 99233 SBSQ HOSP IP/OBS HIGH 50: CPT | Performed by: INTERNAL MEDICINE

## 2024-06-13 RX ORDER — ATORVASTATIN CALCIUM 40 MG/1
40 TABLET, FILM COATED ORAL EVERY EVENING
Status: DISCONTINUED | OUTPATIENT
Start: 2024-06-13 | End: 2024-06-20 | Stop reason: HOSPADM

## 2024-06-13 RX ORDER — HEPARIN SODIUM 10000 [USP'U]/100ML
0-5000 INJECTION, SOLUTION INTRAVENOUS CONTINUOUS
Status: DISPENSED | OUTPATIENT
Start: 2024-06-13 | End: 2024-06-15

## 2024-06-13 RX ORDER — CARVEDILOL 3.12 MG/1
3.12 TABLET ORAL 2 TIMES DAILY WITH MEALS
Status: DISCONTINUED | OUTPATIENT
Start: 2024-06-13 | End: 2024-06-15

## 2024-06-13 RX ORDER — LISINOPRIL 2.5 MG/1
5 TABLET ORAL DAILY
Status: DISCONTINUED | OUTPATIENT
Start: 2024-06-13 | End: 2024-06-15

## 2024-06-13 RX ADMIN — HEPARIN SODIUM 1200 UNITS/HR: 10000 INJECTION, SOLUTION INTRAVENOUS at 17:54

## 2024-06-13 RX ADMIN — ATORVASTATIN CALCIUM 40 MG: 40 TABLET, FILM COATED ORAL at 20:28

## 2024-06-13 RX ADMIN — ASPIRIN 81 MG CHEWABLE TABLET 324 MG: 81 TABLET CHEWABLE at 08:52

## 2024-06-13 RX ADMIN — CLOPIDOGREL BISULFATE 75 MG: 75 TABLET ORAL at 08:52

## 2024-06-13 RX ADMIN — INSULIN ASPART 1 UNITS: 100 INJECTION, SOLUTION INTRAVENOUS; SUBCUTANEOUS at 00:20

## 2024-06-13 RX ADMIN — HUMAN ALBUMIN MICROSPHERES AND PERFLUTREN 9 ML: 10; .22 INJECTION, SOLUTION INTRAVENOUS at 15:07

## 2024-06-13 RX ADMIN — LISINOPRIL 5 MG: 2.5 TABLET ORAL at 17:57

## 2024-06-13 RX ADMIN — CARVEDILOL 3.12 MG: 3.12 TABLET, FILM COATED ORAL at 20:29

## 2024-06-13 ASSESSMENT — ACTIVITIES OF DAILY LIVING (ADL)
ADLS_ACUITY_SCORE: 23
ADLS_ACUITY_SCORE: 27
ADLS_ACUITY_SCORE: 25
ADLS_ACUITY_SCORE: 23
ADLS_ACUITY_SCORE: 24
ADLS_ACUITY_SCORE: 23
ADLS_ACUITY_SCORE: 24
ADLS_ACUITY_SCORE: 23
PREVIOUS_RESPONSIBILITIES: MEAL PREP;HOUSEKEEPING;LAUNDRY;MEDICATION MANAGEMENT;FINANCES;DRIVING;WORK
ADLS_ACUITY_SCORE: 24
ADLS_ACUITY_SCORE: 27
ADLS_ACUITY_SCORE: 23
ADLS_ACUITY_SCORE: 24
ADLS_ACUITY_SCORE: 25
ADLS_ACUITY_SCORE: 23
ADLS_ACUITY_SCORE: 24
ADLS_ACUITY_SCORE: 23
ADLS_ACUITY_SCORE: 23
ADLS_ACUITY_SCORE: 27
ADLS_ACUITY_SCORE: 23

## 2024-06-13 NOTE — PROGRESS NOTES
"   06/13/24 0725   Appointment Info   Signing Clinician's Name / Credentials (OT) Neil Catalan, OTR/L   Living Environment   People in Home alone   Current Living Arrangements house   Home Accessibility stairs within home;stairs to enter home   Number of Stairs, Main Entrance 2   Number of Stairs, Within Home, Primary other (see comments)  (Full flight to basement for laundry)   Stair Railings, Within Home, Primary railings safe and in good condition   Transportation Anticipated family or friend will provide   Living Environment Comments Pt reports living alone in house w/ 2 PRESTON. Has full flight down to basement for laundry. Bed/bath on main level. Tub shower.   Self-Care   Usual Activity Tolerance moderate   Current Activity Tolerance fair   Equipment Currently Used at Home none   Fall history within last six months no   Activity/Exercise/Self-Care Comment Pt reports being IND w/ all ADL's at baseline prior to admission.   Instrumental Activities of Daily Living (IADL)   Previous Responsibilities meal prep;housekeeping;laundry;medication management;finances;driving;work   IADL Comments Pt reports being IND w/ all IADL's at baseline prior to admission. Pt reports that they were still driving.   General Information   Onset of Illness/Injury or Date of Surgery 06/12/24   Referring Physician Cameron Frederick PA-C   Patient/Family Therapy Goal Statement (OT) Return to home.   Additional Occupational Profile Info/Pertinent History of Current Problem Cirilo Gardner is a 63 year old male admitted on 6/12/2024 due to suspected acute posterior left MCA infarction with symptoms of mixed expressive/receptive aphasia and right visual filed cut.   Existing Precautions/Restrictions fall   Cognitive Status Examination   Orientation Status   (Able to state, \"We're getting close\" when asked if it was Thursday in June.)   Affect/Mental Status (Cognitive) confused   Follows Commands follows one-step commands;75-90% " accuracy;delayed response/completion;increased processing time needed   Cognitive Status Comments Pt w/ expressive aphasia   Visual Perception   Visual Impairment/Limitations corrective lenses for reading   Impact of Vision Impairment on Function (Vision) No noted R visual field cut this date during therapy session. Will further assess   Sensory   Sensory Quick Adds sensation intact   Pain Assessment   Patient Currently in Pain No   Range of Motion Comprehensive   General Range of Motion no range of motion deficits identified   Strength Comprehensive (MMT)   General Manual Muscle Testing (MMT) Assessment no strength deficits identified   Coordination   Coordination Comments Pt able to complete opposition to all fingers w/ cueing.   Bed Mobility   Comment (Bed Mobility) Pt up in chair on arrival.   Transfers   Transfers sit-stand transfer;toilet transfer   Sit-Stand Transfer   Sit-Stand [place] (Transfers) contact guard   Toilet Transfer   Type (Toilet Transfer) stand-sit;sit-stand   [place] Level (Toilet Transfer) contact guard   Assistive Device (Toilet Transfer) grab bars/safety frame   Activities of Daily Living   BADL Assessment/Intervention lower body dressing;grooming;toileting   Lower Body Dressing Assessment/Training   Position (Lower Body Dressing) unsupported sitting   [place] Level (Lower Body Dressing) supervision   Grooming Assessment/Training   Position (Grooming) sink side   Comment, (Grooming) SBA   Toileting   Comment, (Toileting) SBA per clinical judgement   Clinical Impression   Criteria for Skilled Therapeutic Interventions Met (OT) Yes, treatment indicated   OT Diagnosis Decreased ADL independence and activity tolerance   Influenced by the following impairments Decreased ADL independence   OT Problem List-Impairments impacting ADL problems related to;activity tolerance impaired;cognition   Assessment of Occupational Performance 1-3 Performance Deficits   Identified Performance  "Deficits Home mgmt, sequencing ADL tasks   Planned Therapy Interventions (OT) ADL retraining;IADL retraining;cognition;home program guidelines;progressive activity/exercise;risk factor education;neuromuscular re-education;fine motor coordination training   Clinical Decision Making Complexity (OT) problem focused assessment/low complexity   Risk & Benefits of therapy have been explained evaluation/treatment results reviewed;care plan/treatment goals reviewed;risks/benefits reviewed;current/potential barriers reviewed;patient   OT Total Evaluation Time   OT Eval, Low Complexity Minutes (26254) 10   OT Goals   Therapy Frequency (OT) Daily   OT Predicted Duration/Target Date for Goal Attainment 06/19/24   OT Goals Hygiene/Grooming;Lower Body Dressing;Toilet Transfer/Toileting;Cognition   OT: Hygiene/Grooming independent;while standing   OT: Lower Body Dressing Independent;including set-up/clothing retrieval   OT: Toilet Transfer/Toileting Independent;toilet transfer;cleaning and garment management   OT: Cognitive Patient/caregiver will verbalize understanding of cognitive assessment results/recommendations as needed for safe discharge planning   Self-Care/Home Management   Self-Care/Home Mgmt/ADL, Compensatory, Meal Prep Minutes (34662) 24   Symptoms Noted During/After Treatment (Meal Preparation/Planning Training) fatigue   Treatment Detail/Skilled Intervention Greeted pt sitting up in chair w/ nursing assistant present in room; agreeable for OT evaluation. Pt very pleasant throughout session. Noted pt w/ expressive aphasia and pt able to respond accurately to yes/no questions. Pt unable to state day/month but states, \"I think we around that time\" when asked if it was Thursday and month of June. Pt able to doff/don L sock w/ supervision via cross leg method; cues to initiate task. Following completion, CGA sit > stand edge of chair w/ no AD; cues to push up off of the chair to safely stand. Pt ambulated within room w/ " CGA progressing to SBA and completed toilet transfer w/ CGA and use of grab bar on L side. Following completion, pt stood sinkside to complete 1 g/h task w/ SBA for safety. Pt then returned to room and seated in bedside chair w/ cues to reach back to arm rest to control decent into chair. Pt remained seated in chair at end of therapy session w/ all needs met and chair alarm activated.   OT Discharge Planning   OT Plan Monitor vision and cognition, sequencing ADL's, TB dressing w/ command following, pt w/ expressive aphasia   OT Discharge Recommendation (DC Rec) home with home care occupational therapy   OT Rationale for DC Rec Pt is functioning below baseline d/t decreased activity tolerance and cognition. Pt lives alone and reports being IND w/ all I/ADL's at baseline prior to admission. Anticipate pt will be able to progress during IP stay to discharge to home w/ home therapies. Will continue to update d/c recommendations as appropriate.   OT Brief overview of current status See above   Total Session Time   Timed Code Treatment Minutes 24   Total Session Time (sum of timed and untimed services) 34

## 2024-06-13 NOTE — CONSULTS
[facility]    Stroke Consult Note    Reason for Consult:  Stroke    Chief Complaint: Altered Mental Status and Stroke Symptoms       HPI  Cirilo Gardner is a 63 year old male who has not seen a doctor in many years, not on any medication PTA. He was brought to the ED 6/12 after he was on the phone with someone who noted him to be confused and notified EMS. In the ED he was noted to have mixed expressive/receptive language deficits and R visual field cut. On further questioning of the patient he was able to provide history that he woke up around 0300 feeling ok, around 0800 he felt that something wasn't right and by 1000 something was definitely not right. TNK not given due to presentation outside the conventional treatment window. No thrombectomy given absence of proximal vessel occlusion.     Today feels much more clear headed and able to say more words but does still have fairly severe mixed expressive/receptive language deficits. Denies any numbness, weakness, facial droop, vision changes, palpations sob or headache at time of event. Was unable to clarify more on events leading to event. Family history of stroke for father. Denies any smoking, Has not been to  In years. He is noted to have bilateral pedal edema and indicates this has been present for about 3 weeks.    Stroke Evaluation Summarized    MRI/Head CT CT: No acute intercranial hemorrhage    MRI: ordered   Intracranial Vasculature CTA: reduced arborization inferior L MCA M3, no clear LVO   Cervical Vasculature CTA: Lack flow L vertebral artery, age indeterminate,  R ICA short segment <50% narrowing, Soft plaque L ICA with mild stenosis      Echocardiogram Ordered   EKG/Telemetry Sinus Tach with PVCs   Other Testing Perfusion: decreased perfusion L temporal and L occipital lobe dec blood flow, Dec volume     LDL  LDL  82   A1C  6/12/2024: 6.4 %   Troponin 6/12/2024: 83 ng/L       Impression  Suspected acute  "ischemic stroke of posterior L MCA due to embolic stroke of undetermined source (ESUS) vs large artery athero (L ICA soft plaque)  L ICA soft plaque - possibly symptomatic  L vert occlusion, unknown chronicity      Recommendations   - Neurochecks and Vital Signs every 4hrs  - Treat for SBP >180 while inpatient, then goal BP <130/80 with tighter control associated with decreased overall CV risk, if tolerated.  - DAPT with daily aspirin 325 mg + Plavix 75 mg, duration pending work up but if L ICA athero is felt to be etiology then likely would need 3 months for secondary stroke prevention  - Statin: atorvastatin 40mg PO daily, LDL goal 40-70  - Telemetry  - Echo ordered  - [facility] Outpatient 14 days  - Bedside Glucose Monitoring  - A1c 6.4% is consistent with diagnosis of pre-diabetes - management/education per primary team. Goal <7%  - PT/OT/SLP consulted. I think would benefit from ARU. Should not drive due to visual field cut.  - Stroke Education  - Euthermia, Euglycemia, Eunatremic  - Set up for checking blood pressures at home daily       Patient Follow-up    -Follow up with stroke ANDREW or general neurology 6-8 weeks (order)  -Establish care with PCP follow up with PCP in 1-2 weeks    Thank you for this consult. We will continue to follow.     The Stroke PA Radha Haney. The Stroke Staff is Dr. Diallo.    Angela George  Medical Student  To page a member of the stroke/neurocritical care service, click here:  AMCOM   Choose \"On Call\" tab at top, then search dropdown box for \"Neurology Adult\", select location, press Enter, then look for stroke/neuro ICU/telestroke.    This patient was seen and examined independently. I agree with information in this note.    Radha Haney PA-C  Vascular Neurology    To page me or covering stroke neurology team member, click here: AMCOM   Choose \"On Call\" tab at top, then search dropdown box for \"Neurology Adult\", select location, press Enter, then look for stroke/neuro " ICU/telestroke.    _____________________________________________________    Clinically Significant Risk Factors Present on Admission                              # Obesity: Estimated body mass index is 38.57 kg/m  as calculated from the following:    Height as of this encounter: 1.829 m (6').    Weight as of this encounter: 129 kg (284 lb 6.3 oz).              Past Medical History    No past medical history on file.  Medications   Home Meds  Prior to Admission medications    Not on File       Scheduled Meds  Current Facility-Administered Medications   Medication Dose Route Frequency Provider Last Rate Last Admin    aspirin (ASA) EC tablet 325 mg  325 mg Oral Daily Cameron Frederick PA-C        Or    aspirin (ASA) chewable tablet 324 mg  324 mg Oral or NG Tube Daily Cameron Frederick PA-C        Or    aspirin (ASA) Suppository 300 mg  300 mg Rectal Daily Cameron Frederick PA-C        clopidogrel (PLAVIX) tablet 75 mg  75 mg Oral or NG Tube Daily Cameron Frederick PA-C        insulin aspart (NovoLOG) injection (RAPID ACTING)  1-7 Units Subcutaneous Q4H Cameron Frederick PA-C   1 Units at 06/13/24 0020    sodium chloride (PF) 0.9% PF flush 3 mL  3 mL Intracatheter Q8H Cameron Frederick PA-C   3 mL at 06/12/24 1912       Infusion Meds  Current Facility-Administered Medications   Medication Dose Route Frequency Provider Last Rate Last Admin    medication instruction - No oral meds if patient didn't pass dysphagia screen   Does not apply Continuous PRN Cameron Frederick PA-C        Medication Instructions - Avoid dextrose in IV solutions.   Intravenous Continuous PRN Cameron Frederick PA-C           Allergies   No Known Allergies       PHYSICAL EXAMINATION   Temp:  [97.7  F (36.5  C)-99.1  F (37.3  C)] 97.7  F (36.5  C)  Pulse:  [] 104  Resp:  [13-22] 16  BP: (152-190)/(107-129) 162/113  SpO2:  [93 %-100 %] 95 %    Neurologic  Mental Status:  alert,  oriented x 1 name, follows single step commands, mixed expressive/receptive aphasia, unable to name objects and can repeat half  phrase but not all phrases, unable to read, responses to questions not always appropriate to what is asked, mild dysarthria  Cranial Nerves:  R visual fields defect - inconsistent and not as dense as yesterday, possible R visual neglect - inconsistent with testing, PERRL, EOMI with normal smooth pursuit, facial sensation intact and symmetric, slight R mouth droop, hearing not formally tested but intact to conversation, tongue protrusion midline, mild dysarthria  Motor:  normal muscle tone and bulk, no abnormal movements, able to move all limbs spontaneously, strength 5/5 throughout upper and lower extremities, right pronation without drift  Reflexes:   Deferred  Sensory:  light touch sensation intact and symmetric throughout upper and lower extremities, no extinction on double simultaneous stimulation   Coordination:  normal heel-to-shin bilaterally without dysmetria  Station/Gait:  deferred    Stroke Scales    NIHSS  1a. Level of Consciousness 0-->Alert, keenly responsive   1b. LOC Questions 2-->Answers neither question correctly   1c. LOC Commands 1-->Performs one task correctly   2.   Best Gaze 0-->Normal   3.   Visual 1-->Partial hemianopia   4.   Facial Palsy 0-->Normal symmetrical movements   5a. Motor Arm, Left 0-->No drift, limb holds 90 (or 45) degrees for full 10 secs   5b. Motor Arm, Right 0-->No drift, limb holds 90 (or 45) degrees for full 10 secs   6a. Motor Leg, Left 0-->No drift, leg holds 30 degree position for full 5 secs   6b. Motor Leg, right 0-->No drift, leg holds 30 degree position for full 5 secs   7.   Limb Ataxia 0-->Absent   8.   Sensory 0-->Normal, no sensory loss   9.   Best Language 2-->Severe aphasia, all communication is through fragmentary expression, great need for inference, questioning, and guessing by the listener. Range of information that can be  "exchanged is limited, listener carries burden of. . . (see row details)   10. Dysarthria 1-->Mild-to-moderate dysarthria, patient slurs at least some words and, at worst, can be understood with some difficulty   11. Extinction and Inattention  0-->No abnormality   Total 7 (06/13/24 0922)       Imaging  I personally reviewed all imaging; relevant findings per HPI.    Labs Data   CBC  Recent Labs   Lab 06/12/24  1408   WBC 11.4*   RBC 5.58   HGB 17.3   HCT 53.3*        Basic Metabolic Panel   Recent Labs   Lab 06/13/24  0611 06/13/24  0409 06/13/24  0234 06/12/24  1814 06/12/24  1408   NA  --   --   --   --  144   POTASSIUM  --   --   --   --  3.8   CHLORIDE  --   --   --   --  104   CO2  --   --   --   --  23   BUN  --   --   --   --  21.2   CR  --   --   --   --  0.96   * 138* 139*   < > 171*   PARISH  --   --   --   --  9.9    < > = values in this interval not displayed.     Liver Panel  No results for input(s): \"PROTTOTAL\", \"ALBUMIN\", \"BILITOTAL\", \"ALKPHOS\", \"AST\", \"ALT\", \"BILIDIRECT\" in the last 168 hours.  INR    Recent Labs   Lab Test 06/12/24  1408   INR 1.14           Stroke Consult Data Data   This was a non-emergent, non-telestroke consult.  I have personally spent a total of 60 minutes providing care today, time spent in reviewing medical records and devising the plan as recorded above.    "

## 2024-06-13 NOTE — PROGRESS NOTES
"[facility]    Hospitalist Progress Note    Date of Admission: 6/12/2024    Assessment & Plan   Cirilo Gardner is a 63 year old male with no significant PMHx who was admitted on 6/12/2024 with a suspected acute posterior left MCA infarction with symptoms of mixed expressive/receptive aphasia and right visual filed cut.      Suspected acute posterior left MCA infarction   Mixed expressive/receptive aphasia  Right visual filed cut  *Brought to ED via EMS for evaluation of altered mental status.  At 1400 he was found sitting in his car outside of his home, he had happened to be on the phone speaking with a client who became concerned as the patient seemed confused so the client called 911. Was later able to tell stroke staff that he woke up around 0400 feeling ok, around 0800 he felt that something wasn't right and by 1000 something was \"definitely not right\" though was unable to further characterize what was feeling off.   *In the ED, was afebrile, tachycardic and hypertensive with SBPs 170s. Was noted to have expressive/receptive deficits and right visual field cut.  Code stroke was called.  Labs notable for WBC 11.4, lytes/Cr stable, ,  proBNP 12k, trops elevated (57 -- 83). EKS showed ST with PVCs, possible LAE; no ST/T changes. Head CT neg. CTA head/neck showed an age-indeterminate lack of flow in the left vertebral artery of uncertain etiology and mild atherosclerotic narrowing  at the R ICA origin but was otherwise nl. CT perfusion study showed delayed contrast arrival in the area of the posterolateral aspect of the left cerebral hemisphere involving portions of the left temporal and left occipital lobes; findings suggestive of infarct.   *Seen by stroke team. Felt to be out of the window for thrombolytics. Given ASA and Plavix load (, Plavix 300). Recommended to start DAPT with ASA 325mg and Plavix 75mg. Also started on atorvastatin 40mg daily.   -- MRI brain " ordered  -- routine stroke workup ordered; A1c 6.4, FLP pending, echo pending  -- cont DAPT with ASA 325mg daily and Plavix 75mg daily  -- cont atorvastatin 40mg daily  -- cont permissive hypertension  -- PT/OT, SLP consults    Type II NSTEMI, suspect dt demand ischemia in setting of CVA  Bilateral LE edema, with elevated proBNP -- concern for possible CHF  *No known hx of CAD, CHF. On admission this stay, noted noted to have bilateral LE edema.   *Trop trend as above. ProBNP significant elevated.  EKG nonischemic. No reports of chest pain  -- check echo as above  -- cont telemetry  -- consider diuresis, pending echo findings and BP trend    1730 Addendum:  Echo showed EF 15-20% with severe global hypokinesia of the LV and an apical LV thrombus. LV apex clot 1.5x1.9cm, non-mobile. Findings discussed with Dr. Diallo of stroke neurology. Will start low intensity heparin gtt with boluses and stop ASA/Plavix. Also noted to have findings of biatrial enlargement, mod decreased RVSF and mod pulmonary hypertension. Cardiology consulted. Per stroke team, new SBP goal 120-160 (or lower if indicated per cardiology) and not on meds at baseline, will start lisinopril 5mg daily and Coreg 3.125mg BID, anticipate further titration of meds in the coming days..     Prediabetes  *A1c 6.4 this stay.   -- using sliding scale insulin while hospitalized  -- follow up with PCP after discharge for ongoing BG mgmt    Leukocytosis, mild: Resolved  *WBC 11.4 on presentation. Suspect secondary to stress demargination.    *WBC normalized without specific intervention    FEN: no IVFs, lytes stable, modified diet per SLP with soft/bite sized diet (level 6) and thin liquids (level 0)  DVT Prophylaxis: PCDs  Code Status: Full Code    Clinically Significant Risk Factors Present on Admission                              # Obesity: Estimated body mass index is 38.57 kg/m  as calculated from the following:    Height as of this encounter: 1.829 m  (6').    Weight as of this encounter: 129 kg (284 lb 6.3 oz).                Disposition: Anticipate discharge in 2-3d pending completion of stroke and cardiac workups. Await PT/OT assessments to determine whether TCU stay will be indicated.     Medically Ready for Discharge: Anticipated in 2-4 Days      Louisa Townsend, DO    Medical Decision Making       Please see A&P for additional details of medical decision making.       Interval History   Chart reviewed. Seen this morning.  Ongoing expressive and receptive aphasia.  Denies complaints of pain.  Breathing nonlabored.  Significant other and brother at bedside and supportive of patient.    -Data reviewed today: I reviewed all new labs and imaging results over the last 24 hours. I personally reviewed no images or EKG's today.    Physical Exam   Temp: 97.7  F (36.5  C) Temp src: Oral BP: (!) 162/113 Pulse: 104   Resp: 16 SpO2: 95 % O2 Device: None (Room air)    Vitals:    06/12/24 1406 06/12/24 1700 06/13/24 0700   Weight: 127 kg (279 lb 15.8 oz) 126 kg (277 lb 12.5 oz) 129 kg (284 lb 6.3 oz)     Vital Signs with Ranges  Temp:  [97.7  F (36.5  C)-99.1  F (37.3  C)] 97.7  F (36.5  C)  Pulse:  [] 104  Resp:  [13-22] 16  BP: (152-190)/(107-129) 162/113  SpO2:  [93 %-100 %] 95 %  No intake/output data recorded.    Constitutional: Resting comfortably, alert, NAD  Respiratory: CTAB, no wheeze, no increased work of breathing or accessory muscle use  Cardiovascular: HRRR, no MGR, ++bilateral LE edema to the knees  GI: Obese, soft, NT  Skin/Integumen: warm/dry  Other:  + Expressive/receptive aphasia, right visual field cut; no focal motor    Medications   Current Facility-Administered Medications   Medication Dose Route Frequency Provider Last Rate Last Admin    medication instruction - No oral meds if patient didn't pass dysphagia screen   Does not apply Continuous PRN Cameron Frederick PA-C        Medication Instructions - Avoid dextrose in IV  solutions.   Intravenous Continuous PRN Cameron Frederick PA-C         Current Facility-Administered Medications   Medication Dose Route Frequency Provider Last Rate Last Admin    aspirin (ASA) EC tablet 325 mg  325 mg Oral Daily Cameron Frederick PA-C        Or    aspirin (ASA) chewable tablet 324 mg  324 mg Oral or NG Tube Daily Cameron Frederick PA-C   324 mg at 06/13/24 0852    Or    aspirin (ASA) Suppository 300 mg  300 mg Rectal Daily Cameron Frederick PA-C        atorvastatin (LIPITOR) tablet 40 mg  40 mg Oral QPM More, Radha MAMI CRAIN        clopidogrel (PLAVIX) tablet 75 mg  75 mg Oral or NG Tube Daily Cameron Frederick PA-C   75 mg at 06/13/24 0852    insulin aspart (NovoLOG) injection (RAPID ACTING)  1-7 Units Subcutaneous Q4H Cameron Frederick PA-C   1 Units at 06/13/24 0020    sodium chloride (PF) 0.9% PF flush 3 mL  3 mL Intracatheter Q8H Cameron Frederick PA-C   3 mL at 06/12/24 1912       Data   Recent Labs   Lab 06/13/24  0752 06/13/24  0742 06/13/24  0611 06/12/24  1814 06/12/24  1408   WBC  --  9.8  --   --  11.4*   HGB  --  16.1  --   --  17.3   MCV  --  95  --   --  96   PLT  --  349  --   --  377   INR  --   --   --   --  1.14   NA  --  140  --   --  144   POTASSIUM  --  3.9  --   --  3.8   CHLORIDE  --  104  --   --  104   CO2  --  21*  --   --  23   BUN  --  22.2  --   --  21.2   CR  --  0.85  --   --  0.96   ANIONGAP  --  15  --   --  17*   PARISH  --  9.7  --   --  9.9   * 136* 139*   < > 171*    < > = values in this interval not displayed.       Recent Results (from the past 24 hour(s))   CT Head w/o Contrast    Narrative    CT OF THE HEAD WITHOUT CONTRAST June 12, 2024 2:16 PM     HISTORY: Altered mental status.    TECHNIQUE: 5 mm thick axial CT images of the head were acquired  without IV contrast material. Radiation dose for this scan was reduced  using automated exposure control, adjustment of the mA and/or kV  according to  patient size, or iterative reconstruction technique.    COMPARISON: None.    FINDINGS: There is moderate diffuse cerebral volume loss. There are  subtle patchy areas of decreased density in the cerebral white matter  bilaterally that are consistent with sequela of chronic small vessel  ischemic disease.     The ventricles and basal cisterns are within normal limits in  configuration given the degree of cerebral volume loss. There is no  midline shift. There are no extra-axial fluid collections.     No intracranial hemorrhage, mass or recent infarct.    The visualized paranasal sinuses are well-aerated. There is no  mastoiditis. There are no fractures of the visualized bones.      Impression    IMPRESSION: Diffuse cerebral volume loss and cerebral white matter  changes consistent with chronic small vessel ischemic disease. No  evidence for acute intracranial pathology.        JONATHON OBRIEN MD         SYSTEM ID:  [id number]   CTA Head Neck with Contrast    Addendum: 6/12/2024    Addendum: After reviewing the perfusion images that demonstrate a  probable infarct at the temporo-occipital junction on the left, there  is decreased arborization of the distal M3 branches of the inferior  division of the left middle cerebral artery possibly representing  multiple thromboembolic occlusions.    This imaging study was discussed with the ordering provider, Dr. TAMMY VANESSA, by Dr. Obrien on 6/12/2024 2:45 PM.    JONATHON OBRIEN MD         SYSTEM ID:  [id number]      Narrative    CT ANGIOGRAM OF THE HEAD AND NECK WITHOUT AND WITH CONTRAST June 12, 2024 2:20 PM     HISTORY: Altered mental status.    TECHNIQUE: Precontrast localizing scans were followed by CT  angiography with an injection of 67mL isovue-370 nonionic intravenous  contrast material with scans through the head and neck. Images were  transferred to a separate 3-D workstation where multiplanar  reformations and 3-D images were created. Estimates of carotid  stenoses  are made relative to the distal internal carotid artery  diameters except as noted. Radiation dose for this scan was reduced  using automated exposure control, adjustment of the mA and/or kV  according to patient size, or iterative reconstruction technique.     COMPARISON: None.    FINDINGS:   Neck CTA: The common carotid arteries bilaterally are widely patent.  There is short segment mild narrowing (less than 50% luminal diameter  stenosis) at the right internal carotid origin that could be caused by  noncalcified plaque. The cervical internal carotid arteries  bilaterally are tortuous but are otherwise patent without additional  areas of narrowing. The left vertebral artery is not filling; this is  of uncertain etiology or age. Acute occlusion of the left vertebral  artery is not excluded. The right vertebral artery is patent without  stenosis.    Head CTA: The basilar, bilateral intracranial distal internal carotid,  bilateral anterior cerebral, bilateral middle cerebral and bilateral  posterior cerebral arteries are patent and unremarkable.      Impression    IMPRESSION:  1. Age-indeterminate lack of flow in the left vertebral artery that is  of uncertain etiology. Acute occlusion cannot be excluded.  2. Mild atherosclerotic narrowing (less than 50% luminal diameter  stenosis) at the right internal carotid origin.  3. Otherwise, normal neck and head CTA.      JONATHON ACOSTA MD         SYSTEM ID:  [id number]   CT Head Perfusion w Contrast - For Tier 2 Stroke    Narrative    CT BRAIN PERFUSION 6/12/2024 2:35 PM    COMPARISON: Head CT same day, head and neck CT angiogram same day.    HISTORY: Altered mental status.    TECHNIQUE: Time sequential axial CT images of the head were acquired  during the administration of intravenous contrast (50mL Isovue-370).  CTA images of the [place] as well as color perfusion maps of  the brain were created from this time sequential axial source data.      Impression     IMPRESSION: There is a late arrival of the contrast bolus to the  posterolateral aspect of the left cerebral hemisphere involving  portions of the left temporal and left occipital lobes. This  corresponds to an area of decreased cerebral blood flow and decreased  cerebral blood volume suggesting infarct. There is questionable loss  of gray-white differentiation at the same location on head CT but head  CT is mildly limited by artifact. No other perfusion defects.      Radiation dose for this scan was reduced using automated exposure  control, adjustment of the mA and/or kV according to patient size, or  iterative reconstruction technique    JONATHON ACOSTA MD         SYSTEM ID:  [id number]

## 2024-06-13 NOTE — PROGRESS NOTES
06/13/24 1016   Appointment Info   Signing Clinician's Name / Credentials (SLP) Jamila Murrell MS CCC SLP   General Information   Onset of Illness/Injury or Date of Surgery 06/12/24   Referring Physician Redd Fairchild   Patient/Family Therapy Goal Statement (SLP) Patient reports being confused likely due to aphasia.   Pertinent History of Current Problem Per notes; This is a 63-year-old male who presented to the hospital with confusion and EMS was notified around 1330 and in the ED was found to have mixed expressive/receptive language deficits and right-sided visual field cut as per neurology. Left MCA stroke.   Type of Evaluation   Type of Evaluation Swallow Evaluation   Oral Motor   Oral Musculature generally intact   Structural Abnormalities none present   Mucosal Quality adequate   Dentition (Oral Motor)   Dentition (Oral Motor) natural dentition;adequate dentition   Facial Symmetry (Oral Motor)   Facial Symmetry (Oral Motor) WNL   Lip Function (Oral Motor)   Lip Range of Motion (Oral Motor) retraction impairment   Lip Strength (Oral Motor) WFL   Comment, Lip Function (Oral Motor) Suspect some apraxia.   Retraction, Lip Range of Motion right side;minimal impairment   Tongue Function (Oral Motor)   Tongue Strength (Oral Motor) strength decreased;right side   Tongue Coordination/Speed (Oral Motor) reduced rate   Tongue ROM (Oral Motor) elevation is impaired;protrusion is impaired   Protrusion, Tongue ROM Impairment (Oral Motor) right side;minimal impairment   Elevation, Tongue ROM Impairment (Oral Motor) right side;moderate impairment   Jaw Function (Oral Motor)   Jaw Function (Oral Motor) WNL   Cough/Swallow/Gag Reflex (Oral Motor)   Soft Palate/Velum (Oral Motor) WNL   Volitional Throat Clear/Cough (Oral Motor) impaired;reduced strength   Volitional Swallow (Oral Motor) WNL   Vocal Quality/Secretion Management (Oral Motor)   Vocal Quality (Oral Motor) WFL   General Swallowing Observations   Past History  of Dysphagia No documented history found.   Respiratory Support room air   Current Diet/Method of Nutritional Intake (General Swallowing Observations, NIS) clear liquid diet   Swallowing Evaluation Clinical swallow evaluation   Clinical Swallow Evaluation   Feeding Assistance no assistance needed   Clinical Swallow Evaluation Textures Trialed thin liquids;pureed;solid foods   Clinical Swallow Eval: Thin Liquid Texture Trial   Mode of Presentation, Thin Liquids cup;straw;self-fed   Volume of Liquid or Food Presented 5 oz of water   Oral Phase of Swallow premature pharyngeal entry   Pharyngeal Phase of Swallow impaired;repeated swallows;throat clearing   Diagnostic Statement Patient tolerated thin liquids via the cup with single and conseutive swallows, but had delayed throat clearing via the straw.   Clinical Swallow Evaluation: Puree Solid Texture Trial   Mode of Presentation, Puree spoon;self-fed   Volume of Puree Presented 2 oz of apple sauce   Oral Phase, Puree WFL   Pharyngeal Phase, Puree impaired;repeated swallows   Diagnostic Statement No sx of aspiration.   Clinical Swallow Evaluation: Solid Food Texture Trial   Mode of Presentation self-fed   Volume Presented 2 anna crackers   Oral Phase residue in oral cavity;premature pharyngeal entry   Oral Residue mid posterior tongue   Pharyngeal Phase impaired;repeated swallows;throat clearing   Diagnostic Statement Mastication was sufficient with decreased bolus control during AP movement and mild oral residue on mid tongue.   Swallowing Recommendations   Diet Consistency Recommendations soft & bite-sized (level 6);thin liquids (level 0)   Supervision Level for Intake distant supervision needed   Mode of Delivery Recommendations bolus size, small;food moistened;no straws;slow rate of intake   Postural Recommendations none   Swallowing Maneuver Recommendations alternate food and liquid intake;extra swallow   Monitoring/Assistance Required (Eating/Swallowing) check  mouth frequently for oral residue/pocketing;stop eating activities when fatigue is present;monitor for cough or change in vocal quality with intake   Recommended Feeding/Eating Techniques (Swallow Eval) maintain upright sitting position for eating;maintain upright posture during/after eating for 30 minutes;minimize distractions during oral intake;set-up and prepare tray   Medication Administration Recommendations, Swallowing (SLP) Whole A as tolerated.   Instrumental Assessment Recommendations VFSS (videofluoroscopic swallowing study)   Comment, Swallowing Recommendations Pending diet tolerance and ability to advance maybe indicated.   General Therapy Interventions   Planned Therapy Interventions Dysphagia Treatment   Dysphagia treatment Modified diet education;Instruction of safe swallow strategies   Clinical Impression   Criteria for Skilled Therapeutic Interventions Met (SLP Eval) Yes, treatment indicated   SLP Diagnosis Mild to moderate oral and pharyngeal dysphagia   Risks & Benefits of therapy have been explained evaluation/treatment results reviewed;care plan/treatment goals reviewed;risks/benefits reviewed;current/potential barriers reviewed;participants voiced agreement with care plan;participants included;patient   Clinical Impression Comments Patient presents with mild to moderate oral and pharyngeal dysphagia secondary to left CVA. Oral motor function was Impaired for ROM and coordination and suspect apraxia. He demonstrated premature entry of thin liquids with tolerance via the cup but suspect possible penetration via the straw due to delayed throat clearing. Mastication was sufficient for solids with decreased bolus control and coordination with mild oral residue on mid tongue. Residue was cleared with additional cued swallow or liquid rinse. Recommend: 1. IDDSI level 6 soft/bite size diet and thin liquids. 2. Upright, no straws, small bites/sips, check for oral residue and alternate liquids/solids.  Hold diet if sx of aspiration present or changes in his respiratory status.   SLP Total Evaluation Time   Eval: oral/pharyngeal swallow function, clinical swallow Minutes (07671) 17   SLP Goals   Therapy Frequency (SLP Eval) daily   SLP Predicted Duration/Target Date for Goal Attainment 06/27/24   SLP Goals Swallow   SLP: Safely tolerate diet without signs/symptoms of aspiration Regular diet;Thin liquids;With use of swallow precautions;With assistance/supervision   Interventions   Interventions Quick Adds Swallowing Dysfunction   Swallowing Dysfunction &/or Oral Function for Feeding   Treatment of Swallowing Dysfunction &/or Oral Function for Feeding Minutes (21117) 10   Symptoms Noted During/After Treatment None   Treatment Detail/Skilled Intervention Patient provided education on the sx of aspiration and rationale for a modified diet and swallow strategies. He was able to implement strategies with moderate verbal cues.   SLP Discharge Planning   SLP Plan meal f/u level 6 and thin diet advance as tolerated.   SLP Discharge Recommendation [facility] patient will benefit from intensive, interdisciplinary therapy.  Anticipate will be able to tolerate 3 hours of therapy per day   SLP Rationale for DC Rec Patient's swallow and communication are below his baseline.   SLP Brief overview of current status  Mild to moderate oral and pharyngeal dysphagia at [facility].

## 2024-06-13 NOTE — PHARMACY-CONSULT NOTE
"Pharmacy Consult to evaluate for medication related stroke core measures    Cirilo Gardner, 63 year old male admitted for altered mental status/stroke symptoms on 6/12/2024.    Thrombolytic was not given because of Time from onset contraindications    VTE Prophylaxis SCDs /PCDs placed on 6/12/24, as appropriate prior to end of hospital day 2.    Antithrombotic: aspirin and clopidogrel started on 6/12/24, as appropriate by end of hospital day 2. Continue antithrombotic therapy on discharge to meet quality measures, unless contraindicated.    Anticoagulation if history of A-fib/flutter: Patient does not have history of A-fib/flutter - anticoagulation not required for medication related stroke core measures.     No results found for: \"LDL\"    Patient currently receiving Lipitor (atorvastatin) continue statin on discharge to meet quality measures, unless contraindicated.    Recommendations:  pharmacy will follow up once LDL results.     Thank you for the consult.    Angélica Walden [facility] 6/13/2024 8:43 AM   "

## 2024-06-13 NOTE — PROGRESS NOTES
06/13/24 1000   Appointment Info   Signing Clinician's Name / Credentials (PT) Zahra Paiz, DPDIAMOND   Living Environment   People in Home alone   Current Living Arrangements house   Home Accessibility stairs within home;stairs to enter home   Number of Stairs, Main Entrance 2   Stair Railings, Main Entrance none   Number of Stairs, Within Home, Primary other (see comments)  (Laundry in basement)   Stair Railings, Within Home, Primary railings safe and in good condition   Transportation Anticipated car, drives self;family or friend will provide   Living Environment Comments Lives alone in house with 2 [place]ST[place] no railings, has brother that lives nearby. Friend Jo-Ann reports she is able to stop by daily and check on him. Brother may be able to assist as well.   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 IND with all ADLs, no AD.Noticed SOB starting 1 month prior.   General Information   Onset of Illness/Injury or Date of Surgery 06/12/24   Referring Physician Cameron Frederick PA-C   Patient/Family Therapy Goals Statement (PT) Go home   Pertinent History of Current Problem (include personal factors and/or comorbidities that impact the POC) Cirilo Gardner is a 63 year old male with no significant PMHx who was admitted on 6/12/2024 with a suspected acute posterior left MCA infarction with symptoms of mixed expressive/receptive aphasia and right visual filed cut.   Existing Precautions/Restrictions fall   Cognition   Affect/Mental Status (Cognition) WFL   Orientation Status (Cognition) oriented x 4   Follows Commands (Cognition) WFL   Cognitive Status Comments Expressive/receptive aphasia   Pain Assessment   Patient Currently in Pain No    Integumentary/Edema   Integumentary/Edema no deficits were identifed   Posture    Posture Forward head position   Range of Motion (ROM)   Range of Motion ROM is WFL   Strength (Manual Muscle Testing)   Strength (Manual Muscle Testing) strength is WFL   Bed Mobility   Comment, (Bed Mobility) NT   Transfers   Comment, (Transfers) CGA no AD   Gait/Stairs (Locomotion)   Comment, (Gait/Stairs) CGA no AD, mild weaving to L side. Some difficulty dual tasking. SOB requried seated breaks   Balance   Balance Comments CGA no AD, mild unsteadiness   Clinical Impression   Criteria for Skilled Therapeutic Intervention Yes, treatment indicated   PT Diagnosis (PT) impaired gait   Influenced by the following impairments SOB, weakness, imbalance   Functional limitations due to impairments fall risk   Clinical Presentation (PT Evaluation Complexity) stable   Clinical Presentation Rationale cliical judgement   Clinical Decision Making (Complexity) low complexity   Planned Therapy Interventions (PT) balance training;bed mobility training;gait training;home exercise program;ROM (range of motion);stair training;strengthening;stretching;transfer training   Risk & Benefits of therapy have been explained evaluation/treatment results reviewed;care plan/treatment goals reviewed;risks/benefits reviewed;current/potential barriers reviewed;participants voiced agreement with care plan;participants included;patient   PT Total Evaluation Time   PT Eval, Low Complexity Minutes (99036) 10   Physical Therapy Goals   PT Frequency Daily   PT Predicted Duration/Target Date for Goal Attainment 06/20/24   PT Goals Bed Mobility;Gait;Transfers;Stairs   PT: Bed Mobility Independent;Supine to/from sit;Rolling;Bridging   PT: Transfers Independent;Sit to/from stand;Bed to/from chair;Assistive device   PT: Gait Independent;Assistive device;50 feet   PT: Stairs Independent;2 stairs   Interventions   Interventions Quick Adds Gait Training;Therapeutic Activity    Therapeutic Activity   Therapeutic Activities: dynamic activities to improve functional performance Minutes (36932) 9   Symptoms Noted During/After Treatment None   Treatment Detail/Skilled Intervention Pt in recliner, agreeable for PT. Friend Jo-Ann present and brother showed up later. Sit<>stand CGA no AD, assist donning gown and making sure socks fit. Time monitoring vitals, VSS. After amb, returned to [facility] on daily walking recs, discharge planning and answered family questions within scope regarding recovery.   Gait Training   Gait Training Minutes (87171) 15   Symptoms Noted During/After Treatment (Gait Training) shortness of breath   Treatment Detail/Skilled Intervention Gait training: no AD CGA 100ft with moderate pace, some reaching out for wall to balance. Pt SOB, required standing rest breaks and 1 seated break. Some repetition of directions required d/t receptive aphasia and/or motor planning deficits. Pt very limited by SOB which he reports started 1 month prior.   Distance in Feet 2x100   [place] Level (Gait Training) contact guard   PT Discharge Planning   PT Plan FGA, stairs no railins x2   PT Discharge Recommendation (DC Rec) home with assist;home with outpatient physical therapy   PT Rationale for DC Rec Pt slightly below PLOF, currently CGA no AD. Anticipate with several more IP sessions, pt will be safe to progress home with daily check ins from friends Jo-Ann, Justino and brother. Rec OPPT to progress dynamic balance, pt reports he will take an uber there.   PT Brief overview of current status CGA no AD, ok to amb w/ friend in halls. Rec amb 3x daily

## 2024-06-13 NOTE — PLAN OF CARE
Reason for Admission: L MCA    Cognitive/Mentation: Alert; Disoriented x 4  Neuros/CMS: WFD, gabled speech, slow to respond, R facial droop, dysarthria, R field cut, expressive/ receptive aphasia  VS: permissive HTN; SBP < 220 .   Tele: SR.  /GI: Continent/ Continent. Urinal bedside  Pulmonary: LS clear/ coarse  Pain: Denies.     Drains/Lines: 3 PIV SL  Skin: Intact  Activity: Assist x 1-2 with GB + Walker.  Diet: Clear liquid.   Therapies recs: PT/OT/SLP  Discharge: Pending    Aggression Stoplight Tool: Green    Plan for MRI; Pt unable to complete MRI checklist, try again tomorrow AM, PRN Labetalol and Hydralazine available for elevated BP, trend troponin labs

## 2024-06-13 NOTE — PLAN OF CARE
Here for L MCA. Disoriented to time and situation. Neuros intact x global aphasia. ECHO done and results reported to MD. Started heparin gtt per orders. Up w/ SBA. Tele: ST. Denied pain. Voiding in BR. Tolerating soft/bite sized with good appetite. Takes pills whole. Alarms on. MRI and US carotid ordered.

## 2024-06-13 NOTE — PLAN OF CARE
Oriented to place only. Tele: SR/ST. Spo2>94% on RA. Neuro: Severe expressive/Receptive aphasia. Mild to mod dysarthria, R field cut, R facial droop noted. Yes/No questions work effectively. Voiding adequately.No BM this shift. Clear liq diet. Ax1 w/GB&W.

## 2024-06-14 ENCOUNTER — APPOINTMENT (OUTPATIENT)
Dept: CT IMAGING | Facility: CLINIC | Age: 64
DRG: 064 | End: 2024-06-14
Attending: NURSE PRACTITIONER
Payer: COMMERCIAL

## 2024-06-14 ENCOUNTER — ORDERS ONLY (AUTO-RELEASED) (OUTPATIENT)
Dept: MEDSURG UNIT | Facility: CLINIC | Age: 64
End: 2024-06-14
Payer: COMMERCIAL

## 2024-06-14 ENCOUNTER — APPOINTMENT (OUTPATIENT)
Dept: OCCUPATIONAL THERAPY | Facility: CLINIC | Age: 64
DRG: 064 | End: 2024-06-14
Attending: PSYCHIATRY & NEUROLOGY
Payer: COMMERCIAL

## 2024-06-14 ENCOUNTER — APPOINTMENT (OUTPATIENT)
Dept: ULTRASOUND IMAGING | Facility: CLINIC | Age: 64
DRG: 064 | End: 2024-06-14
Attending: PHYSICIAN ASSISTANT
Payer: COMMERCIAL

## 2024-06-14 ENCOUNTER — APPOINTMENT (OUTPATIENT)
Dept: PHYSICAL THERAPY | Facility: CLINIC | Age: 64
DRG: 064 | End: 2024-06-14
Attending: PSYCHIATRY & NEUROLOGY
Payer: COMMERCIAL

## 2024-06-14 ENCOUNTER — APPOINTMENT (OUTPATIENT)
Dept: MRI IMAGING | Facility: CLINIC | Age: 64
DRG: 064 | End: 2024-06-14
Attending: PHYSICIAN ASSISTANT
Payer: COMMERCIAL

## 2024-06-14 DIAGNOSIS — I63.9 ACUTE CVA (CEREBROVASCULAR ACCIDENT) (H): ICD-10-CM

## 2024-06-14 LAB
ANION GAP SERPL CALCULATED.3IONS-SCNC: 15 MMOL/L (ref 7–15)
BUN SERPL-MCNC: 30 MG/DL (ref 8–23)
CALCIUM SERPL-MCNC: 9.6 MG/DL (ref 8.8–10.2)
CHLORIDE SERPL-SCNC: 102 MMOL/L (ref 98–107)
CREAT SERPL-MCNC: 1.02 MG/DL (ref 0.67–1.17)
DEPRECATED HCO3 PLAS-SCNC: 21 MMOL/L (ref 22–29)
EGFRCR SERPLBLD CKD-EPI 2021: 82 ML/MIN/1.73M2
ERYTHROCYTE [DISTWIDTH] IN BLOOD BY AUTOMATED COUNT: 13.2 % (ref 10–15)
GLUCOSE BLDC GLUCOMTR-MCNC: 110 MG/DL (ref 70–99)
GLUCOSE BLDC GLUCOMTR-MCNC: 113 MG/DL (ref 70–99)
GLUCOSE BLDC GLUCOMTR-MCNC: 124 MG/DL (ref 70–99)
GLUCOSE BLDC GLUCOMTR-MCNC: 128 MG/DL (ref 70–99)
GLUCOSE BLDC GLUCOMTR-MCNC: 95 MG/DL (ref 70–99)
GLUCOSE SERPL-MCNC: 121 MG/DL (ref 70–99)
HCT VFR BLD AUTO: 48.7 % (ref 40–53)
HGB BLD-MCNC: 16.1 G/DL (ref 13.3–17.7)
MCH RBC QN AUTO: 31.4 PG (ref 26.5–33)
MCHC RBC AUTO-ENTMCNC: 33.1 G/DL (ref 31.5–36.5)
MCV RBC AUTO: 95 FL (ref 78–100)
PLATELET # BLD AUTO: 334 10E3/UL (ref 150–450)
POTASSIUM SERPL-SCNC: 4.1 MMOL/L (ref 3.4–5.3)
RADIOLOGIST FLAGS: ABNORMAL
RBC # BLD AUTO: 5.12 10E6/UL (ref 4.4–5.9)
SODIUM SERPL-SCNC: 138 MMOL/L (ref 135–145)
UFH PPP CHRO-ACNC: 0.1 IU/ML
UFH PPP CHRO-ACNC: 0.17 IU/ML
UFH PPP CHRO-ACNC: 0.26 IU/ML
UFH PPP CHRO-ACNC: <0.1 IU/ML
WBC # BLD AUTO: 10.6 10E3/UL (ref 4–11)

## 2024-06-14 PROCEDURE — 70551 MRI BRAIN STEM W/O DYE: CPT

## 2024-06-14 PROCEDURE — 99223 1ST HOSP IP/OBS HIGH 75: CPT | Performed by: INTERNAL MEDICINE

## 2024-06-14 PROCEDURE — 93880 EXTRACRANIAL BILAT STUDY: CPT

## 2024-06-14 PROCEDURE — 250N000013 HC RX MED GY IP 250 OP 250 PS 637: Performed by: PHYSICIAN ASSISTANT

## 2024-06-14 PROCEDURE — 250N000011 HC RX IP 250 OP 636: Mod: JZ | Performed by: INTERNAL MEDICINE

## 2024-06-14 PROCEDURE — 97116 GAIT TRAINING THERAPY: CPT | Mod: GP

## 2024-06-14 PROCEDURE — 99418 PROLNG IP/OBS E/M EA 15 MIN: CPT | Mod: FS | Performed by: PSYCHIATRY & NEUROLOGY

## 2024-06-14 PROCEDURE — 80048 BASIC METABOLIC PNL TOTAL CA: CPT | Performed by: INTERNAL MEDICINE

## 2024-06-14 PROCEDURE — 36415 COLL VENOUS BLD VENIPUNCTURE: CPT | Performed by: INTERNAL MEDICINE

## 2024-06-14 PROCEDURE — 120N000001 HC R&B MED SURG/OB

## 2024-06-14 PROCEDURE — 99233 SBSQ HOSP IP/OBS HIGH 50: CPT | Mod: FS | Performed by: PSYCHIATRY & NEUROLOGY

## 2024-06-14 PROCEDURE — 70450 CT HEAD/BRAIN W/O DYE: CPT

## 2024-06-14 PROCEDURE — 97110 THERAPEUTIC EXERCISES: CPT | Mod: GP

## 2024-06-14 PROCEDURE — 85027 COMPLETE CBC AUTOMATED: CPT | Performed by: INTERNAL MEDICINE

## 2024-06-14 PROCEDURE — 99233 SBSQ HOSP IP/OBS HIGH 50: CPT | Performed by: INTERNAL MEDICINE

## 2024-06-14 PROCEDURE — 97535 SELF CARE MNGMENT TRAINING: CPT | Mod: GO

## 2024-06-14 PROCEDURE — 250N000013 HC RX MED GY IP 250 OP 250 PS 637: Performed by: INTERNAL MEDICINE

## 2024-06-14 PROCEDURE — 85520 HEPARIN ASSAY: CPT | Performed by: INTERNAL MEDICINE

## 2024-06-14 RX ORDER — FUROSEMIDE 10 MG/ML
40 INJECTION INTRAMUSCULAR; INTRAVENOUS DAILY
Status: COMPLETED | OUTPATIENT
Start: 2024-06-14 | End: 2024-06-16

## 2024-06-14 RX ADMIN — CARVEDILOL 3.12 MG: 3.12 TABLET, FILM COATED ORAL at 09:41

## 2024-06-14 RX ADMIN — LISINOPRIL 5 MG: 2.5 TABLET ORAL at 09:42

## 2024-06-14 RX ADMIN — HEPARIN SODIUM 1350 UNITS/HR: 10000 INJECTION, SOLUTION INTRAVENOUS at 09:58

## 2024-06-14 RX ADMIN — HEPARIN SODIUM 1350 UNITS/HR: 10000 INJECTION, SOLUTION INTRAVENOUS at 03:56

## 2024-06-14 RX ADMIN — CARVEDILOL 3.12 MG: 3.12 TABLET, FILM COATED ORAL at 18:42

## 2024-06-14 RX ADMIN — ATORVASTATIN CALCIUM 40 MG: 40 TABLET, FILM COATED ORAL at 20:09

## 2024-06-14 RX ADMIN — FUROSEMIDE 40 MG: 10 INJECTION, SOLUTION INTRAMUSCULAR; INTRAVENOUS at 09:52

## 2024-06-14 ASSESSMENT — ACTIVITIES OF DAILY LIVING (ADL)
ADLS_ACUITY_SCORE: 27
ADLS_ACUITY_SCORE: 24
ADLS_ACUITY_SCORE: 27
ADLS_ACUITY_SCORE: 24
ADLS_ACUITY_SCORE: 27

## 2024-06-14 NOTE — PROGRESS NOTES
Notified by RN that patient was attempting to leave [facility] and due to his expressive aphasia it was difficult to determine his orientation.    I presented to bedside to evaluate the patient's decision-making capacity. Patient's aphasia was quite severe to the point that he could not communicate even his desire to leave but it was clear that he was frustrated with his bed alarm and being told that he needed to stay in bed.     Patient could not display understanding: he was unable to tell me the meaning of his diagnosis or the risks/benefits of treatment even after I relayed that information to him.    Patient could not express a choice: Even after I informed him the need to determine his decision-making capacity he could not clearly state his choice written or verbally.    Patient could not display appreciation: Patient could not tell me his medical problem in his own words or the consequences surrounding his decisions.    Patient could not display the ability to reason: Unable to state how his condition could affect his daily life and seemed as though he did not understand the degree of his deficits.    Given this information I informed the patient that I could not determine that he had decision-making capacity and informed the patient that he could not leave the hospital by his own choosing. Patient was upset but he did not seem to be upset. I informed him that we would not allow him to discharge himself at this time and if he attempted to leave the hospital we would be required to stop him and return him to his room.    DEBRA Jolly, CNP  Hospitalist - [facility]  Text me on the [employer] robles for a textback

## 2024-06-14 NOTE — PROGRESS NOTES
[facility]    Stroke Progress Note    Interval Events  MRI confirms moderate L temporal/occipital infarct and additional smaller L frontal infarct. Petechial hemorrhage in area of L temporal/occipital stroke.  TTE with LVEF 15-20, severe hypokinesis and apical thrombus; ASA/plavix stopped and he w2as transitioned to low intensity heparin with bolus. Patient attempted to leave AMA overnight but was determined to not have sufficient decision making capacity. Head CT with small areas of hemorrhage; heparin continued. Mild edema; monitoring.     HPI Summary  Cirilo Gardner is a 63 year old male who has not seen a doctor in many years, not on any medication PTA. He was brought to the ED 6/12 after he was on the phone with someone who noted him to be confused and notified EMS. In the ED he was noted to have mixed expressive/receptive language deficits and R visual field cut. On further questioning of the patient he was able to provide history that he woke up around 0300 feeling ok, around 0800 he felt that something wasn't right and by 1000 something was definitely not right. TNK not given due to presentation outside the conventional treatment window. No thrombectomy given absence of proximal vessel occlusion.  MRI with L temporal/occipital and frontal infarcts.     Stroke Evaluation Summarized     MRI/Head CT CT 6/12: No acute intercranial hemorrhage  CT 6/14: evolving L temporal/occipital infarct with small areas of petechial hemorrhage, small amount SAH in L temporal/occipital region, mild mass effect on posterior L lateral ventricle without hydrocephalus or entrapment      MRI: subacute infarct of L temporal/occipital and L parasagittal frontal regions, petechial hemorrhage of temporal/occipital infarct (only DWI and sagittal T1 sequences completed)    Intracranial Vasculature CTA: reduced arborization inferior L MCA M3, no clear LVO   Cervical Vasculature CTA: Lack flow L  vertebral artery, age indeterminate,  R ICA short segment <50% narrowing, Soft plaque L ICA with mild stenosis     Carotid US:  1. Less than 50% diameter stenosis of the right ICA relative to the  distal ICA diameter.   2. Less than 50% diameter stenosis of the left ICA relative to the  distal ICA diameter.   3. Occlusion of the left vertebral artery, unchanged when compared to  the prior CT from 6/12/2024.            Echocardiogram LVEF 15-20%, mild to moderate concentric LVH, severe global hypokinesia  of LV, apical L ventricular thrombus 1.5x1.9cm, moderate biatrial enlargement    EKG/Telemetry Sinus Tach with PVCs   Other Testing Perfusion: decreased perfusion L temporal and L occipital lobe dec blood flow, Dec volume      LDL  LDL  82   A1C  6/12/2024: 6.4 %   Troponin 6/12/2024: 83 ng/L       Impression   L frontal, temporal and occipital infarcts due to cardioembolism   Apical thrombus, LVEF 15-20%, severe global hypokinesia   L ICA soft plaque -not felt to be symptomatic   L vert occlusion, unknown chronicity  Mild resultant edema    Plan  - Neurochecks and Vital Signs every 4hrs  - Inpatient BP goal 120-160   - Continue low intensity heparin with bolus   - Statin: atorvastatin 40mg PO daily, LDL goal 40-70  - Telemetry  - Bedside Glucose Monitoring  - A1c 6.4% is consistent with diagnosis of pre-diabetes - management/education per primary team. Goal <7%  - PT/OT/SLP consulted. I think would benefit from ARU. Should not drive due to visual field cut.  - Stroke Education  - Euthermia, Euglycemia, Eunatremic  - Set up for checking blood pressures at home daily     Patient Follow-up    - final recommendation pending work-up  -14 day cardiac event monitor (Zio Patch) to be mailed to patient, ordered   -sleep referral for evaluation of possible YENNIFER, ordered     We will continue to follow.       DEBRA Lyon CNP  Vascular Neurology    To page me or covering stroke neurology team member, click here:  "[facility]  Choose \"On Call\" tab at top, then select \"NEUROLOGY/ALL SITES\" from middle drop-down box, press Enter, then look for \"stroke\" or \"telestroke\" for your site.  ______________________________________________________    Clinically Significant Risk Factors                   # Chronic heart failure with reduced ejection fraction: last echo with EF <40%             # Obesity: Estimated body mass index is 38.57 kg/m  as calculated from the following:    Height as of this encounter: 1.829 m (6').    Weight as of this encounter: 129 kg (284 lb 6.3 oz)., PRESENT ON ADMISSION              Medications   Scheduled Meds  Current Facility-Administered Medications   Medication Dose Route Frequency Provider Last Rate Last Admin    atorvastatin (LIPITOR) tablet 40 mg  40 mg Oral QPM Radha Haney PA-C   40 mg at 06/13/24 2028    carvedilol (COREG) tablet 3.125 mg  3.125 mg Oral BID w/meals Louisa Townsend DO   3.125 mg at 06/13/24 2029    insulin aspart (NovoLOG) injection (RAPID ACTING)  1-7 Units Subcutaneous TID AC Louisa Townsend DO        insulin aspart (NovoLOG) injection (RAPID ACTING)  1-5 Units Subcutaneous At Bedtime Louisa Townsend DO        lisinopril (ZESTRIL) tablet 5 mg  5 mg Oral Daily Louisa Townsend DO   5 mg at 06/13/24 1757    sodium chloride (PF) 0.9% PF flush 3 mL  3 mL Intracatheter Q8H Cameron Frederick PA-C   3 mL at 06/14/24 0200       Infusion Meds  Current Facility-Administered Medications   Medication Dose Route Frequency Provider Last Rate Last Admin    heparin 25,000 units in 0.45% NaCl 250 mL ANTICOAGULANT infusion  0-5,000 Units/hr Intravenous Continuous Louisa Townsend DO 13.5 mL/hr at 06/14/24 0356 1,350 Units/hr at 06/14/24 0356    medication instruction - No oral meds if patient didn't pass dysphagia screen   Does not apply Continuous PRN Cameron Frederick PA-C        Medication Instructions - Avoid dextrose in IV solutions.  "  Intravenous Continuous PRN Cameron Frederick PA-C           PRN Meds  Current Facility-Administered Medications   Medication Dose Route Frequency Provider Last Rate Last Admin    acetaminophen (TYLENOL) Suppository 650 mg  650 mg Rectal Q4H PRN Cameron Frederick PA-C        glucose gel 15-30 g  15-30 g Oral Q15 Min PRN Cameron Frederick PA-C        Or    dextrose 50 % injection 25-50 mL  25-50 mL Intravenous Q15 Min PRN Cameron Frederick PA-C        Or    glucagon injection 1 mg  1 mg Subcutaneous Q15 Min PRN Cameron Frederick PA-C        labetalol (NORMODYNE/TRANDATE) injection 10-20 mg  10-20 mg Intravenous Q10 Min PRN Cameron Frederick PA-C        Or    hydrALAZINE (APRESOLINE) injection 10-20 mg  10-20 mg Intravenous Q1H PRN Cameron Frederick PA-C        lidocaine (LMX4) cream   Topical Q1H PRN Cameron Frederick PA-C        lidocaine 1 % 0.1-1 mL  0.1-1 mL Other Q1H PRN Cameron Frederick PA-C        medication instruction - No oral meds if patient didn't pass dysphagia screen   Does not apply Continuous PRN Cameron Frederick PA-C        Medication Instructions - Avoid dextrose in IV solutions.   Intravenous Continuous PRN Cameron Frederick PA-C        ondansetron (ZOFRAN ODT) ODT tab 4 mg  4 mg Oral Q6H PRN Cameron Frederick PA-C        Or    ondansetron (ZOFRAN) injection 4 mg  4 mg Intravenous Q6H PRN Cameron Frederick PA-C        sodium chloride (PF) 0.9% PF flush 3 mL  3 mL Intracatheter q1 min prn Cameron Frederick PA-C   3 mL at 06/13/24 0020          PHYSICAL EXAMINATION  Temp:  [97.3  F (36.3  C)-99.7  F (37.6  C)] 98  F (36.7  C)  Pulse:  [] 87  Resp:  [16-22] 18  BP: (107-162)/() 129/84  SpO2:  [95 %-98 %] 98 %      General Exam  General:  patient sitting in chair without any acute distress    HEENT:  normocephalic/atraumatic  Pulmonary:  no respiratory distress    Neuro  "Exam  Mental Status:  alert; oriented to self, is able to identify \"hospital\" from list, but not able to identify which hospital or city, unable to describe why he is in the hospital, able to count to 10 forwards and backwards, follows most commands but requires miming and demonstration, expressive>receptive aphasia, perseverates on tasks   Cranial Nerves: R field cut, PER, EOMI with normal smooth pursuit, facial sensation intact and symmetric, facial movements symmetric, hearing not formally tested but intact to conversation, palate elevation symmetric and uvula midline, no dysarthria, shoulder shrug strong bilaterally, tongue protrusion midline  Motor:  normal muscle tone and bulk, no abnormal movements, able to move all limbs spontaneously, strength 5/5 throughout upper and lower extremities, no pronator drift  Sensory:  light touch sensation intact and symmetric throughout upper and lower extremities, no extinction on double simultaneous stimulation   Coordination:  no ataxia identified  Station/Gait:  deferred      Imaging  I personally reviewed all imaging; relevant findings per HPI.     Lab Results Data   CBC  Recent Labs   Lab 06/13/24  0742 06/12/24  1408   WBC 9.8 11.4*   RBC 5.26 5.58   HGB 16.1 17.3   HCT 50.2 53.3*    377     Basic Metabolic Panel    Recent Labs   Lab 06/13/24  2223 06/13/24  1633 06/13/24  1154 06/13/24  0752 06/13/24  0742 06/12/24  1814 06/12/24  1408   NA  --   --   --   --  140  --  144   POTASSIUM  --   --   --   --  3.9  --  3.8   CHLORIDE  --   --   --   --  104  --  104   CO2  --   --   --   --  21*  --  23   BUN  --   --   --   --  22.2  --  21.2   CR  --   --   --   --  0.85  --  0.96   * 129* 134*   < > 136*   < > 171*   PARISH  --   --   --   --  9.7  --  9.9    < > = values in this interval not displayed.     Liver Panel  No results for input(s): \"PROTTOTAL\", \"ALBUMIN\", \"BILITOTAL\", \"ALKPHOS\", \"AST\", \"ALT\", \"BILIDIRECT\" in the last 168 hours.  INR    Recent Labs "   Lab Test 06/12/24  1408   INR 1.14      Lipid Profile    Recent Labs   Lab Test 06/13/24  0742   CHOL 135   HDL 41   LDL 82   TRIG 58     A1C    Recent Labs   Lab Test 06/12/24  1408   A1C 6.4*     Troponin    Recent Labs   Lab 06/12/24  2226 06/12/24 2015 06/12/24  1823   CTROPT 83* 83* 78*          Data     I have personally spent a total of 60 minutes providing care today, time spent in reviewing medical records and devising the plan as recorded above.

## 2024-06-14 NOTE — CONSULTS
[facility]    Cardiology Consultation     Date of Admission:  6/12/2024    Assessment & Plan   Cirilo Gardner is a 63 year old male who was admitted on 6/12/2024.    Severe LV systolic dysfunction (newly diagnosed), ejection fraction 15 to 20%  LV apical thrombus measuring 1.5 x 1.9 cm  Acute left MCA stroke likely due to #2  Severe expressive/receptive aphasia  Confusion  Heart failure with reduced ejection fraction  Mildly elevated thrombus without delta, likely demand  Obesity    The patient is unable to provide much history given severe aphasia.  Per report no prior medical history although he has not seen a physician in a long time.  The cause of his severe cardiomyopathy is unclear at this point.  Given risk factors, will need to rule out coronary disease.  Therefore he will need right and left heart catheterization at some point.  He currently does not have decision-making capacity therefore unable to consent to any procedures.  No family with him at this point    Recommend initiation of anticoagulation given LV thrombus.  Recommend starting with heparin and transition to DOAC prior to discharge from the hospital.  Will initiate IV furosemide given volume overload.  I suspect he will need intravenous furosemide for few more days before transitioning to oral diuretics.  He was initiated on Coreg as part of guideline directed medical therapy for his cardiomyopathy.  Will slowly add other medications starting with Entresto and SGLT2i, likely tomorrow.    Right and left heart catheterization and possibly cardiac MRI when able to follow commands/consent during this hospital stay or in the outpatient.    Ramiro Sung MD    High complexity     Primary Care Physician   No primary care provider on file.    Reason for Consult   Reason for consult: Severe LV systolic function, apical LV thrombus    History of Present Illness   Cirilo Gardner is a 63 year old male with obesity and no  known medical history (has not seen a doctor in many years) who was brought to the emergency department with confusion and expressive/receptive aphasia along with right visual cut.  MRI of the brain demonstrated moderate-sized evolving infarct in the left temporal/occipital lobes with petechial hemorrhage as well as evolving infarct in the parasagittal left frontal lobe.    As part of his workup the patient had a transthoracic echocardiogram, laboratory studies and electrocardiogram.  His laboratory studies demonstrated elevated NT proBNP of 12,210 and trivially elevated troponin without significant delta.  His echocardiogram revealed dilated left ventricle with severely reduced systolic function with an EF of 15 to 20%.  There is severe global hypokinesis of the left ventricle.  Additionally, a 1.5 to 1.9 cm none mobile thrombus was seen in the LV apex.  There is moderate biatrial enlargement and moderately decreased RV systolic function.    The patient is currently unable to communicate due to severe expressive/receptive aphasia.  He is breathing comfortably on room air but appears to be volume overloaded with significant lower extremity edema and elevated JVP.       Past Medical History   No past medical history on file.    Past Surgical History   No past surgical history on file.    Prior to Admission Medications   None     Current Facility-Administered Medications   Medication Dose Route Frequency Provider Last Rate Last Admin    acetaminophen (TYLENOL) Suppository 650 mg  650 mg Rectal Q4H PRN Cameron Frederick PA-C        atorvastatin (LIPITOR) tablet 40 mg  40 mg Oral QPM Radha Haney PA-C   40 mg at 06/13/24 2028    carvedilol (COREG) tablet 3.125 mg  3.125 mg Oral BID w/meals Louisa Townsend DO   3.125 mg at 06/13/24 2029    glucose gel 15-30 g  15-30 g Oral Q15 Min PRN Cameron Frederick PA-C        Or    dextrose 50 % injection 25-50 mL  25-50 mL Intravenous Q15 Min PRN  Cameron Frederick PA-C        Or    glucagon injection 1 mg  1 mg Subcutaneous Q15 Min PRN Cameron Frederick PA-C        furosemide (LASIX) injection 40 mg  40 mg Intravenous Daily Ramiro Sung MD        heparin 25,000 units in 0.45% NaCl 250 mL ANTICOAGULANT infusion  0-5,000 Units/hr Intravenous Continuous Louisa Townsend DO   Stopped at 06/14/24 0745    labetalol (NORMODYNE/TRANDATE) injection 10-20 mg  10-20 mg Intravenous Q10 Min PRN Cameron Frederick PA-C        Or    hydrALAZINE (APRESOLINE) injection 10-20 mg  10-20 mg Intravenous Q1H PRN Cameron Frederick PA-C        insulin aspart (NovoLOG) injection (RAPID ACTING)  1-7 Units Subcutaneous TID AC Louisa Townsend DO        insulin aspart (NovoLOG) injection (RAPID ACTING)  1-5 Units Subcutaneous At Bedtime Louisa Townsend DO        lidocaine (LMX4) cream   Topical Q1H PRN Cameron Frederick PA-C        lidocaine 1 % 0.1-1 mL  0.1-1 mL Other Q1H PRN Cameron Frederick PA-C        lisinopril (ZESTRIL) tablet 5 mg  5 mg Oral Daily Luoisa Townsend DO   5 mg at 06/13/24 1757    medication instruction - No oral meds if patient didn't pass dysphagia screen   Does not apply Continuous PRN Cameron Frederick PA-C        Medication Instructions - Avoid dextrose in IV solutions.   Intravenous Continuous PRN Cameron Frederick PA-C        ondansetron (ZOFRAN ODT) ODT tab 4 mg  4 mg Oral Q6H PRN Cameron Frederick PA-C        Or    ondansetron (ZOFRAN) injection 4 mg  4 mg Intravenous Q6H PRN Cameron Frederick PA-C        sodium chloride (PF) 0.9% PF flush 3 mL  3 mL Intracatheter Q8H Cameron Frederick PA-C   3 mL at 06/14/24 0200    sodium chloride (PF) 0.9% PF flush 3 mL  3 mL Intracatheter q1 min prn Cameron Frederick PA-C   3 mL at 06/13/24 0020     Current Facility-Administered Medications   Medication Dose Route Frequency Provider  Last Rate Last Admin    acetaminophen (TYLENOL) Suppository 650 mg  650 mg Rectal Q4H PRN Cameron Frederick PA-C        atorvastatin (LIPITOR) tablet 40 mg  40 mg Oral QPM Radha Haney PA-C   40 mg at 06/13/24 2028    carvedilol (COREG) tablet 3.125 mg  3.125 mg Oral BID w/meals Louisa Townsend DO   3.125 mg at 06/13/24 2029    glucose gel 15-30 g  15-30 g Oral Q15 Min PRN Cameron Frederick PA-C        Or    dextrose 50 % injection 25-50 mL  25-50 mL Intravenous Q15 Min PRN Cameron Frederick PA-C        Or    glucagon injection 1 mg  1 mg Subcutaneous Q15 Min PRN Cameron Frederick PA-C        furosemide (LASIX) injection 40 mg  40 mg Intravenous Daily Ramiro Sung MD        heparin 25,000 units in 0.45% NaCl 250 mL ANTICOAGULANT infusion  0-5,000 Units/hr Intravenous Continuous Louisa Townsend DO   Stopped at 06/14/24 0745    labetalol (NORMODYNE/TRANDATE) injection 10-20 mg  10-20 mg Intravenous Q10 Min PRN Cameron Frederick PA-C        Or    hydrALAZINE (APRESOLINE) injection 10-20 mg  10-20 mg Intravenous Q1H PRN Cameron Frederick PA-C        insulin aspart (NovoLOG) injection (RAPID ACTING)  1-7 Units Subcutaneous TID AC Louisa Townsend DO        insulin aspart (NovoLOG) injection (RAPID ACTING)  1-5 Units Subcutaneous At Bedtime Louisa Townsend DO        lidocaine (LMX4) cream   Topical Q1H PRN Cameron Frederick PA-C        lidocaine 1 % 0.1-1 mL  0.1-1 mL Other Q1H PRN Cameron Frederick PA-C        lisinopril (ZESTRIL) tablet 5 mg  5 mg Oral Daily Louisa Townsend DO   5 mg at 06/13/24 1752    medication instruction - No oral meds if patient didn't pass dysphagia screen   Does not apply Continuous PRN Cameron Frederick PA-C        Medication Instructions - Avoid dextrose in IV solutions.   Intravenous Continuous PRN Cameron Frederick PA-C        ondansetron (ZOFRAN ODT) ODT  "tab 4 mg  4 mg Oral Q6H PRN Cameron Frederick PA-C        Or    ondansetron (ZOFRAN) injection 4 mg  4 mg Intravenous Q6H PRN Cameron Frederick PA-C        sodium chloride (PF) 0.9% PF flush 3 mL  3 mL Intracatheter Q8H Cameron Frederick PA-C   3 mL at 06/14/24 0200    sodium chloride (PF) 0.9% PF flush 3 mL  3 mL Intracatheter q1 min prn Camerno Frederick PA-C   3 mL at 06/13/24 0020     Allergies   No Known Allergies    Social History        Family History         Review of Systems   A comprehensive review of system was performed and is negative other than that noted in the HPI or here.     Physical Exam   Vital Signs with Ranges  Temp:  [97.3  F (36.3  C)-99.7  F (37.6  C)] 98  F (36.7  C)  Pulse:  [] 87  Resp:  [18-22] 18  BP: (107-162)/() 129/84  SpO2:  [95 %-98 %] 98 %  Wt Readings from Last 4 Encounters:   06/13/24 129 kg (284 lb 6.3 oz)     No intake/output data recorded.      Vitals: /84 (BP Location: Right arm)   Pulse 87   Temp 98  F (36.7  C) (Oral)   Resp 18   Ht 1.829 m (6')   Wt 129 kg (284 lb 6.3 oz)   SpO2 98%   BMI 38.57 kg/m      Physical Exam:   General - Alert and oriented to time place and person in no acute distress  Eyes - No scleral icterus  HEENT - Neck supple, moist mucous membranes  Cardiovascular -regular rate and rhythm, no murmurs  Extremities - There is 3+ edema  Respiratory -diminished at the bases  Skin - No pallor or cyanosis  Gastrointestinal - Non tender and non distended without rebound or guarding  Psych - Appropriate affect   Neurological - No gross motor neurological focal deficits    No lab results found in last 7 days.    Invalid input(s): \"TROPONINIES\"    Recent Labs   Lab 06/13/24  2223 06/13/24  1633 06/13/24  1154 06/13/24  0752 06/13/24  0742 06/12/24  1814 06/12/24  1408   WBC  --   --   --   --  9.8  --  11.4*   HGB  --   --   --   --  16.1  --  17.3   MCV  --   --   --   --  95  --  96   PLT  --   --   --   -- " " 349  --  377   INR  --   --   --   --   --   --  1.14   NA  --   --   --   --  140  --  144   POTASSIUM  --   --   --   --  3.9  --  3.8   CHLORIDE  --   --   --   --  104  --  104   CO2  --   --   --   --  21*  --  23   BUN  --   --   --   --  22.2  --  21.2   CR  --   --   --   --  0.85  --  0.96   GFRESTIMATED  --   --   --   --  >90  --  88   ANIONGAP  --   --   --   --  15  --  17*   PARISH  --   --   --   --  9.7  --  9.9   * 129* 134*   < > 136*   < > 171*    < > = values in this interval not displayed.     Recent Labs   Lab Test 06/13/24  0742   CHOL 135   HDL 41   LDL 82   TRIG 58     Recent Labs   Lab 06/13/24  0742 06/12/24  1408   WBC 9.8 11.4*   HGB 16.1 17.3   HCT 50.2 53.3*   MCV 95 96    377     No results for input(s): \"PH\", \"PHV\", \"PO2\", \"PO2V\", \"SAT\", \"PCO2\", \"PCO2V\", \"HCO3\", \"HCO3V\" in the last 168 hours.  Recent Labs   Lab 06/12/24  1408   NTBNPI 12,210*     No results for input(s): \"DD\" in the last 168 hours.  No results for input(s): \"SED\", \"CRP\" in the last 168 hours.  Recent Labs   Lab 06/13/24  0742 06/12/24  1408    377     No results for input(s): \"TSH\" in the last 168 hours.  No results for input(s): \"COLOR\", \"APPEARANCE\", \"URINEGLC\", \"URINEBILI\", \"URINEKETONE\", \"SG\", \"UBLD\", \"URINEPH\", \"PROTEIN\", \"UROBILINOGEN\", \"NITRITE\", \"LEUKEST\", \"RBCU\", \"WBCU\" in the last 168 hours.    Imaging:  Recent Results (from the past 48 hour(s))   CT Head w/o Contrast    Narrative    CT OF THE HEAD WITHOUT CONTRAST June 12, 2024 2:16 PM     HISTORY: Altered mental status.    TECHNIQUE: 5 mm thick axial CT images of the head were acquired  without IV contrast material. Radiation dose for this scan was reduced  using automated exposure control, adjustment of the mA and/or kV  according to patient size, or iterative reconstruction technique.    COMPARISON: None.    FINDINGS: There is moderate diffuse cerebral volume loss. There are  subtle patchy areas of decreased density in the cerebral " white matter  bilaterally that are consistent with sequela of chronic small vessel  ischemic disease.     The ventricles and basal cisterns are within normal limits in  configuration given the degree of cerebral volume loss. There is no  midline shift. There are no extra-axial fluid collections.     No intracranial hemorrhage, mass or recent infarct.    The visualized paranasal sinuses are well-aerated. There is no  mastoiditis. There are no fractures of the visualized bones.      Impression    IMPRESSION: Diffuse cerebral volume loss and cerebral white matter  changes consistent with chronic small vessel ischemic disease. No  evidence for acute intracranial pathology.        JONATHON OBRIEN MD         SYSTEM ID:  [id number]   CTA Head Neck with Contrast    Addendum: 6/12/2024    Addendum: After reviewing the perfusion images that demonstrate a  probable infarct at the temporo-occipital junction on the left, there  is decreased arborization of the distal M3 branches of the inferior  division of the left middle cerebral artery possibly representing  multiple thromboembolic occlusions.    This imaging study was discussed with the ordering provider, Dr. TAMMY VANESSA, by Dr. Obrien on 6/12/2024 2:45 PM.    JONATHON OBRIEN MD         SYSTEM ID:  [id number]      Narrative    CT ANGIOGRAM OF THE HEAD AND NECK WITHOUT AND WITH CONTRAST June 12, 2024 2:20 PM     HISTORY: Altered mental status.    TECHNIQUE: Precontrast localizing scans were followed by CT  angiography with an injection of 67mL isovue-370 nonionic intravenous  contrast material with scans through the head and neck. Images were  transferred to a separate 3-D workstation where multiplanar  reformations and 3-D images were created. Estimates of carotid  stenoses are made relative to the distal internal carotid artery  diameters except as noted. Radiation dose for this scan was reduced  using automated exposure control, adjustment of the mA and/or kV  according  to patient size, or iterative reconstruction technique.     COMPARISON: None.    FINDINGS:   Neck CTA: The common carotid arteries bilaterally are widely patent.  There is short segment mild narrowing (less than 50% luminal diameter  stenosis) at the right internal carotid origin that could be caused by  noncalcified plaque. The cervical internal carotid arteries  bilaterally are tortuous but are otherwise patent without additional  areas of narrowing. The left vertebral artery is not filling; this is  of uncertain etiology or age. Acute occlusion of the left vertebral  artery is not excluded. The right vertebral artery is patent without  stenosis.    Head CTA: The basilar, bilateral intracranial distal internal carotid,  bilateral anterior cerebral, bilateral middle cerebral and bilateral  posterior cerebral arteries are patent and unremarkable.      Impression    IMPRESSION:  1. Age-indeterminate lack of flow in the left vertebral artery that is  of uncertain etiology. Acute occlusion cannot be excluded.  2. Mild atherosclerotic narrowing (less than 50% luminal diameter  stenosis) at the right internal carotid origin.  3. Otherwise, normal neck and head CTA.      JONATHON ACOSTA MD         SYSTEM ID:  [id number]   CT Head Perfusion w Contrast - For Tier 2 Stroke    Narrative    CT BRAIN PERFUSION 6/12/2024 2:35 PM    COMPARISON: Head CT same day, head and neck CT angiogram same day.    HISTORY: Altered mental status.    TECHNIQUE: Time sequential axial CT images of the head were acquired  during the administration of intravenous contrast (50mL Isovue-370).  CTA images of the [place] as well as color perfusion maps of  the brain were created from this time sequential axial source data.      Impression    IMPRESSION: There is a late arrival of the contrast bolus to the  posterolateral aspect of the left cerebral hemisphere involving  portions of the left temporal and left occipital lobes. This  corresponds to  an area of decreased cerebral blood flow and decreased  cerebral blood volume suggesting infarct. There is questionable loss  of gray-white differentiation at the same location on head CT but head  CT is mildly limited by artifact. No other perfusion defects.      Radiation dose for this scan was reduced using automated exposure  control, adjustment of the mA and/or kV according to patient size, or  iterative reconstruction technique    JONATHON ACOSTA MD         SYSTEM ID:  [id number]   Echocardiogram Complete - For age > 60 yrs   Result Value    LVEF  15-20%    Narrative    [id number]  [id number]  [id number]^SLY^SHRADDHA^DULCE MARIA     [facility]  Echocardiography Laboratory  [address] [place] [address]     Name: LIMA GILL  MRN: [id number]  : 1960  Study Date: 2024 02:46 PM  Age: 63 yrs  Gender: Male  Patient Location: [facility]  Reason For Study: Cerebrovascular Incident  Ordering Physician: SHRADDHA HEART  Performed By: Amisha Scott     BSA: 2.5 m2  Height: 72 in  Weight: 279 lb  HR: 99  BP: 174/112 mmHg  ______________________________________________________________________________  Procedure  Complete Portable Echo Adult. Optison (NDC #3796-0732) given intravenously.  ______________________________________________________________________________  Interpretation Summary     The left ventricle is borderline dilated.  There is mild to moderate concentric left ventricular hypertrophy.  The visual ejection fraction is 15-20%.  Severely decreased left ventricular systolic function  There is severe global hypokinesia of the left ventricle.  There is an apical left ventricular thrombus  LV apex clot 1.5 x 1.9 cm-non mobile  Moderately decreased right ventricular systolic function  There is moderate biatrial enlargement.  Right ventricular systolic pressure is elevated, consistent with moderate  pulmonary hypertension.  IVC diameter >2.1 cm collapsing  <50% with sniff suggests a high RA pressure  estimated at 15 mmHg or greater.  The rhythm was sinus tachycardia.  Emergency finding called to RN station [phone]  ______________________________________________________________________________  Left Ventricle  The left ventricle is borderline dilated. There is mild to moderate concentric  left ventricular hypertrophy. The visual ejection fraction is 15-20%. Severely  decreased left ventricular systolic function. Grade II or moderate diastolic  dysfunction. There is severe global hypokinesia of the left ventricle. There  is an apical left ventricular thrombus. LV apex clot 1.5 x 1.9 cm-non mobile.     Right Ventricle  The right ventricle is normal size. Moderately decreased right ventricular  systolic function.     Atria  There is moderate biatrial enlargement.     Mitral Valve  There is mild mitral annular calcification. There is mild (1+) mitral  regurgitation.     Tricuspid Valve  There is mild (1+) tricuspid regurgitation. Right ventricular systolic  pressure is elevated, consistent with moderate pulmonary hypertension. The  right ventricular systolic pressure is approximated at 38.6 mmHg plus the  right atrial pressure. IVC diameter >2.1 cm collapsing <50% with sniff  suggests a high RA pressure estimated at 15 mmHg or greater.     Aortic Valve  The aortic valve is trileaflet with aortic valve sclerosis.     Pulmonic Valve  The pulmonic valve is not well seen, but is grossly normal.     Vessels  The aortic root is normal size.     Pericardium  The pericardium appears normal.     Rhythm  The rhythm was sinus tachycardia.  ______________________________________________________________________________  MMode/2D Measurements & Calculations     IVSd: 1.3 cm  LVIDd: 5.6 cm  LVIDs: 4.7 cm  LVPWd: 1.5 cm  FS: 16.3 %  LV mass(C)d: 337.4 grams  LV mass(C)dI: 137.5 grams/m2  Ao root diam: 3.6 cm  LA dimension: 5.0 cm  asc Aorta Diam: 3.8 cm  LA/Ao: 1.4  LVOT diam: 2.2 cm  LVOT  area: 3.9 cm2  Ao root diam index Ht(cm/m): 2.0  Ao root diam index BSA (cm/m2): 1.5  Asc Ao diam index BSA (cm/m2): 1.5  Asc Ao diam index Ht(cm/m): 2.1  LA Volume (BP): 135.0 ml     LA Volume Index (BP): 55.1 ml/m2  RWT: 0.53  TAPSE: 1.1 cm     Doppler Measurements & Calculations  MV E max toim: 78.3 cm/sec  MV A max tomi: 38.2 cm/sec  MV E/A: 2.0  MV dec slope: 1011 cm/sec2  MV dec time: 0.08 sec  PA acc time: 0.08 sec  TR max tomi: 267.7 cm/sec  TR max P.6 mmHg  E/E' avg: 15.5  Lateral E/e': 10.1  Medial E/e': 21.0  RV S Tomi: 8.4 cm/sec     ______________________________________________________________________________  Report approved by: Kumar Saavedra 2024 05:15 PM         MR Brain w/o Contrast   Result Value    Radiologist flags Acute intracranial hemorrhage (AA)    Narrative    EXAM: MR BRAIN W/O CONTRAST  LOCATION: [facility]  DATE: 2024    INDICATION: Suspected acute CVA  COMPARISON: CTA head and neck 2024  TECHNIQUE: Diffusion-weighted sequences and sagittal T1-weighted images only were obtained. Patient was not able to complete the study.    FINDINGS:  INTRACRANIAL CONTENTS: There is a 9 x 3.4 cm (AP by TR) zone of restricted diffusion at the left temporal/occipital lobes with internal petechial hemorrhage. Cytotoxic edema produces mild mass effect on the trigone and atria of the left lateral   ventricle. There is a 3.5 x 2.6 cm (AP by TR) zone of restricted diffusion in the parasagittal left frontal lobe. No midline shift. No hydrocephalus.        Impression    IMPRESSION:  1.  Moderate-sized evolving infarct in the left temporal/occipital lobes with petechial hemorrhage.  2.  Smaller zone of evolving infarction in the parasagittal left frontal lobe.  3.  Please note that the patient was not able to complete the exam and diffusion and sagittal T1 sequences only are available for interpretation.      [Critical Result: Acute intracranial  hemorrhage]    Finding was identified on 6/14/2024 2:34 AM CDT.     1.  Dr. Yarbrough was contacted by me on 6/14/2024 3:15 AM CDT and verbalized understanding of the critical result.    CT Head w/o Contrast    Narrative    CT SCAN OF THE HEAD WITHOUT CONTRAST   6/14/2024 8:55 AM     HISTORY: Left temporal/occipital/frontal infarcts with petechial  hemorrhage, on heparin therapy; assess extend of petechial hemorrhage.    TECHNIQUE:  Axial images of the head and coronal reformations without  IV contrast material. Radiation dose for this scan was reduced using  automated exposure control, adjustment of the mA and/or kV according  to patient size, or iterative reconstruction technique.    COMPARISON: Brain MR from earlier the same day. Head CT 6/12/2024.    FINDINGS: Evolving infarct again seen in the left temporal/occipital  region. Infarct shows loss of gray-white matter differentiation which  is new since 6/12/2024. There is some hyperdensity within the area of  infarct compatible with petechial hemorrhage, also seen on brain MR  from earlier today. There is also small amount of subarachnoid  hemorrhage in the left temporal and occipital region. Mild regional  edema in the area of infarct with crowding of the cerebral sulci.  Slight mass effect on the posterior left lateral ventricle. No  evidence of hydrocephalus or ventricular entrapment. Additional  infarct in the left frontal lobe with slight loss of gray-white matter  differentiation, much better seen on MR. Basal cisterns remain patent.  Moderate diffuse parenchymal volume loss. Periventricular white matter  hypodensities are nonspecific, but most likely related to chronic  microvascular ischemic disease.    Mucosal thickening in the inferior left maxillary sinus. The bony  calvarium and bones of the skull base appear intact.       Impression    IMPRESSION:     1. Evolving infarct in the left temporal/occipital region with areas  of petechial hemorrhage. Mild  mass effect on the posterior left  lateral ventricle without evidence of hydrocephalus or ventricular  entrapment.   2. Additional infarct in the left frontal lobe with slight loss of  gray-white matter differentiation. This is better seen on MR.  3. Small amount subarachnoid hemorrhage in the left temporal and  occipital region.        Echo:  No results found for this or any previous visit (from the past 4320 hour(s)).    Clinically Significant Risk Factors                   # Chronic heart failure with reduced ejection fraction: last echo with EF <40%             # Obesity: Estimated body mass index is 38.57 kg/m  as calculated from the following:    Height as of this encounter: 1.829 m (6').    Weight as of this encounter: 129 kg (284 lb 6.3 oz)., PRESENT ON ADMISSION

## 2024-06-14 NOTE — PROVIDER NOTIFICATION
"MD Notification    Notified Person: NP    Notified Person Name: Lala Rodriguez     Notification Date/Time: 6/14/24 at 0828    Notification Interaction: vocera    Purpose of Notification: \"    Patient had new petechial hemorrhage found on incomplete MRI last night.  wanted to verify if heparin should continue or be stopped?       Orders Received: STAT CT ordered; wait to resume until resulted/neuro approves    Comments: okay to resume heparin per neuroCVA    "

## 2024-06-14 NOTE — PROVIDER NOTIFICATION
Per his request, IFRAH sent to Dr. Sung from cardiology regarding the okay to resume heparin per neuro team. Page sent through [facility]

## 2024-06-14 NOTE — PROGRESS NOTES
"[facility]    Hospitalist Progress Note    Date of Admission: 6/12/2024    Assessment & Plan   Cirilo Gardner is a 63 year old male with no significant PMHx who was admitted on 6/12/2024 with a suspected acute posterior left MCA infarction with symptoms of mixed expressive/receptive aphasia and right visual filed cut.      Acute ischemic stroke of posterior left MCA presumed dt LV thrombus; with petechial hemorrhage on MRI  Mixed expressive/receptive aphasia  Right visual filed cut  *Brought to ED via EMS for evaluation of altered mental status.  At 1400 he was found sitting in his car outside of his home, he had happened to be on the phone speaking with a client who became concerned as the patient seemed confused so the client called 911. Was later able to tell stroke staff that he woke up around 0400 feeling ok, around 0800 he felt that something wasn't right and by 1000 something was \"definitely not right\" though was unable to further characterize what was feeling off.   *In the ED, was afebrile, tachycardic and hypertensive with SBPs 170s. Was noted to have expressive/receptive deficits and right visual field cut.  Code stroke was called.  Labs notable for WBC 11.4, lytes/Cr stable, ,  proBNP 12k, trops elevated (57 -- 83). EKS showed ST with PVCs, possible LAE; no ST/T changes. Head CT neg. CTA head/neck showed an age-indeterminate lack of flow in the left vertebral artery of uncertain etiology and mild atherosclerotic narrowing  at the R ICA origin but was otherwise nl. CT perfusion study showed delayed contrast arrival in the area of the posterolateral aspect of the left cerebral hemisphere involving portions of the left temporal and left occipital lobes; findings suggestive of infarct.   *Routine stroke labs obtained -- A1c 6.4, FLP with tot cholest 135, HDL 41, LDL 82.   *Seen by stroke team. Felt to be out of the window for thrombolytics. Given ASA and Plavix load " (, Plavix 300). Recommended to start DAPT with ASA 325mg and Plavix 75mg. Also started on atorvastatin 40mg daily.   *Echo obtained and showed findings of an apical LV thrombus.  Findings were discussed with stroke service.  Aspirin and Plavix were stopped and he was placed on a low intensity heparin drip on 6/13 PM.  *MRI brain finally obtained on 6/14 at 0100 and showed a moderate-sized evolving infarct in the temporal/occipital lobes with petechial hemorrhage and a similar zone of evolving infarct in the parasagittal left frontal lobe.  Stroke neurology service was made aware of these findings given recent initiation of anticoagulation.  Was continued on heparin drip.  -- stroke team following  -- conts on heparin gtt for now  -- cont atorvastatin 40mg daily  -- tighten BP control, goal -160  -- PT/OT, on modified diet per SLP  -- intermittent agitation this stay likely multifactorial, due in part to his ongoing expressive/receptive aphasia; bedside sitter ordered on 6/14 AM after patient attempted to leave AMA x2, consider addition of medication such as Seroquel if agitation persists however do not want to mask any other underlying potential changes in neurologic status given evolving findings on MRI    Acute LV thrombus  Newly diagnosed systolic CHF, chronicity unclear  NSTEMI, suspect type II dt demand ischemia in setting of CVA  Pulmonary hypertension  *No known hx of CAD, CHF. On admission this stay, noted to have bilateral LE edema, patient mentioned had been there for several days though unclear in regards to the specifics.   *Trop trend as above. ProBNP significant elevated.  EKG nonischemic. No reports of chest pain.  *Echo obtained this stay and showed EF 15-20% with severe global hypokinesia of the LV and an apical LV thrombus. LV apex clot 1.5x1.9cm, non-mobile. Also noted to have findings of biatrial enlargement, mod decreased RVSF and mod pulmonary hypertension.   -- placed on heparin  gtt on 6/13 as above  -- okay for tighter BP control per stroke team --  started on lisinopril 5mg daily and Coreg 3.125mg BID  -- cardiology consulted, await recommendations on further workup and titration of meds -- likely needs ischemic workup at some point  -- consider initiation of diuresis    Prediabetes  *A1c 6.4 this stay.   -- using sliding scale insulin while hospitalized  -- follow up with PCP after discharge for ongoing BG mgmt    Recent Labs   Lab 06/13/24  2223 06/13/24  1633 06/13/24  1154 06/13/24  0752 06/13/24  0742 06/13/24  0611   * 129* 134* 129* 136* 139*         Leukocytosis, mild: Resolved  *WBC 11.4 on presentation. Suspect secondary to stress demargination.    *WBC normalized without specific intervention    FEN: no IVFs, lytes stable, modified diet per SLP with soft/bite sized diet (level 6) and thin liquids (level 0)  DVT Prophylaxis: PCDs  Code Status: Full Code    Clinically Significant Risk Factors                   # Chronic heart failure with reduced ejection fraction: last echo with EF <40%               # Obesity: Estimated body mass index is 38.57 kg/m  as calculated from the following:    Height as of this encounter: 1.829 m (6').    Weight as of this encounter: 129 kg (284 lb 6.3 oz)., PRESENT ON ADMISSION              Disposition: Discharge date unclear at this time, suspect 5+ days still given need for ongoing cardiac/stroke evaluations.  PT/OT consults pending at this time but anticipate patient will need TCU stay at discharge.  Required initiation of a bedside sitter on 6/14 AM, will need to be sitter-free for 24 hours prior to discharge.    Medically Ready for Discharge: Anticipated in 5+ Days    Will update patient's brother at bedside later today when he visits.    Louisa Townsend, DO    Medical Decision Making       Please see A&P for additional details of medical decision making.       Interval History   Chart reviewed.  Overnight events noted.  Had been  started on heparin drip after he was found to have an LV thrombus.  MRI of the brain obtained overnight showed small areas of petechial hemorrhage.  Stroke team was notified.  Additionally, an RRT was called after patient attempted to leave AMA.  Patient's degree of understanding of his current situation and hospitalization is limited due to his noted expressive and receptive aphasia.  Was ultimately agreeable to remaining in hospital.  I saw patient this morning.  He was again attempting to leave AMA and had ripped out his IV.  He was directed back into his chair.  Continues to exhibit ongoing expressive and receptive aphasia.  Inconsistently able to follow commands.  Does not appear to be in pain.    -Data reviewed today: I reviewed all new labs and imaging results over the last 24 hours. I personally reviewed no images or EKG's today.    Physical Exam   Temp: 98  F (36.7  C) Temp src: Oral BP: 129/84 Pulse: 87   Resp: 18 SpO2: 98 % O2 Device: None (Room air)    Vitals:    06/12/24 1406 06/12/24 1700 06/13/24 0700   Weight: 127 kg (279 lb 15.8 oz) 126 kg (277 lb 12.5 oz) 129 kg (284 lb 6.3 oz)     Vital Signs with Ranges  Temp:  [97.3  F (36.3  C)-99.7  F (37.6  C)] 98  F (36.7  C)  Pulse:  [] 87  Resp:  [18-22] 18  BP: (107-162)/() 129/84  SpO2:  [95 %-98 %] 98 %  No intake/output data recorded.    Constitutional: Resting comfortably, alert, NAD  Respiratory: CTAB, no wheeze, no increased work of breathing or accessory muscle use  Cardiovascular: HRRR, no MGR, ++bilateral LE edema to the knees  GI: obese, soft, NT  Skin/Integumen: warm/dry  Other:  ++expressive/receptive aphasia, right visual field cut; no focal motor    Medications   Current Facility-Administered Medications   Medication Dose Route Frequency Provider Last Rate Last Admin    heparin 25,000 units in 0.45% NaCl 250 mL ANTICOAGULANT infusion  0-5,000 Units/hr Intravenous Continuous Louisa Townsend DO 13.5 mL/hr at 06/14/24 0359  1,350 Units/hr at 06/14/24 0356    medication instruction - No oral meds if patient didn't pass dysphagia screen   Does not apply Continuous PRN Cameron Frederick PA-C        Medication Instructions - Avoid dextrose in IV solutions.   Intravenous Continuous PRN Cameron Frederick PA-C         Current Facility-Administered Medications   Medication Dose Route Frequency Provider Last Rate Last Admin    atorvastatin (LIPITOR) tablet 40 mg  40 mg Oral QPM MoreRadha PA-C   40 mg at 06/13/24 2028    carvedilol (COREG) tablet 3.125 mg  3.125 mg Oral BID w/meals Louisa Townsend DO   3.125 mg at 06/13/24 2029    insulin aspart (NovoLOG) injection (RAPID ACTING)  1-7 Units Subcutaneous TID AC Louisa Townsend DO        insulin aspart (NovoLOG) injection (RAPID ACTING)  1-5 Units Subcutaneous At Bedtime Louisa Townsend DO        lisinopril (ZESTRIL) tablet 5 mg  5 mg Oral Daily Louisa Townsend DO   5 mg at 06/13/24 1757    sodium chloride (PF) 0.9% PF flush 3 mL  3 mL Intracatheter Q8H Cameron Frederick PA-C   3 mL at 06/14/24 0200       Data   Recent Labs   Lab 06/13/24  2223 06/13/24  1633 06/13/24  1154 06/13/24  0752 06/13/24  0742 06/12/24  1814 06/12/24  1408   WBC  --   --   --   --  9.8  --  11.4*   HGB  --   --   --   --  16.1  --  17.3   MCV  --   --   --   --  95  --  96   PLT  --   --   --   --  349  --  377   INR  --   --   --   --   --   --  1.14   NA  --   --   --   --  140  --  144   POTASSIUM  --   --   --   --  3.9  --  3.8   CHLORIDE  --   --   --   --  104  --  104   CO2  --   --   --   --  21*  --  23   BUN  --   --   --   --  22.2  --  21.2   CR  --   --   --   --  0.85  --  0.96   ANIONGAP  --   --   --   --  15  --  17*   PARISH  --   --   --   --  9.7  --  9.9   * 129* 134*   < > 136*   < > 171*    < > = values in this interval not displayed.       Recent Results (from the past 24 hour(s))   Echocardiogram Complete - For age > 60 yrs    Result Value    LVEF  15-20%    Narrative    [id number]  [id number]  [id number]  [id number]^SLY^SHRADDHA^DULCE MARIA     [facility]  Echocardiography Laboratory  [address] [place] [address]     Name: LIMA GILL  MRN: [id number]  : 1960  Study Date: 2024 02:46 PM  Age: 63 yrs  Gender: Male  Patient Location: [facility]  Reason For Study: Cerebrovascular Incident  Ordering Physician: SHRADDHA HEART  Performed By: Amisha Scott     BSA: 2.5 m2  Height: 72 in  Weight: 279 lb  HR: 99  BP: 174/112 mmHg  ______________________________________________________________________________  Procedure  Complete Portable Echo Adult. Optison (NDC #[id number]) given intravenously.  ______________________________________________________________________________  Interpretation Summary     The left ventricle is borderline dilated.  There is mild to moderate concentric left ventricular hypertrophy.  The visual ejection fraction is 15-20%.  Severely decreased left ventricular systolic function  There is severe global hypokinesia of the left ventricle.  There is an apical left ventricular thrombus  LV apex clot 1.5 x 1.9 cm-non mobile  Moderately decreased right ventricular systolic function  There is moderate biatrial enlargement.  Right ventricular systolic pressure is elevated, consistent with moderate  pulmonary hypertension.  IVC diameter >2.1 cm collapsing <50% with sniff suggests a high RA pressure  estimated at 15 mmHg or greater.  The rhythm was sinus tachycardia.  Emergency finding called to RN station [id number]  ______________________________________________________________________________  Left Ventricle  The left ventricle is borderline dilated. There is mild to moderate concentric  left ventricular hypertrophy. The visual ejection fraction is 15-20%. Severely  decreased left ventricular systolic function. Grade II or moderate diastolic  dysfunction. There is severe global  hypokinesia of the left ventricle. There  is an apical left ventricular thrombus. LV apex clot 1.5 x 1.9 cm-non mobile.     Right Ventricle  The right ventricle is normal size. Moderately decreased right ventricular  systolic function.     Atria  There is moderate biatrial enlargement.     Mitral Valve  There is mild mitral annular calcification. There is mild (1+) mitral  regurgitation.     Tricuspid Valve  There is mild (1+) tricuspid regurgitation. Right ventricular systolic  pressure is elevated, consistent with moderate pulmonary hypertension. The  right ventricular systolic pressure is approximated at 38.6 mmHg plus the  right atrial pressure. IVC diameter >2.1 cm collapsing <50% with sniff  suggests a high RA pressure estimated at 15 mmHg or greater.     Aortic Valve  The aortic valve is trileaflet with aortic valve sclerosis.     Pulmonic Valve  The pulmonic valve is not well seen, but is grossly normal.     Vessels  The aortic root is normal size.     Pericardium  The pericardium appears normal.     Rhythm  The rhythm was sinus tachycardia.  ______________________________________________________________________________  MMode/2D Measurements & Calculations     IVSd: 1.3 cm  LVIDd: 5.6 cm  LVIDs: 4.7 cm  LVPWd: 1.5 cm  FS: 16.3 %  LV mass(C)d: 337.4 grams  LV mass(C)dI: 137.5 grams/m2  Ao root diam: 3.6 cm  LA dimension: 5.0 cm  asc Aorta Diam: 3.8 cm  LA/Ao: 1.4  LVOT diam: 2.2 cm  LVOT area: 3.9 cm2  Ao root diam index Ht(cm/m): 2.0  Ao root diam index BSA (cm/m2): 1.5  Asc Ao diam index BSA (cm/m2): 1.5  Asc Ao diam index Ht(cm/m): 2.1  LA Volume (BP): 135.0 ml     LA Volume Index (BP): 55.1 ml/m2  RWT: 0.53  TAPSE: 1.1 cm     Doppler Measurements & Calculations  MV E max hermelindo: 78.3 cm/sec  MV A max hermelindo: 38.2 cm/sec  MV E/A: 2.0  MV dec slope: 1011 cm/sec2  MV dec time: 0.08 sec  PA acc time: 0.08 sec  TR max hermelindo: 267.7 cm/sec  TR max P.6 mmHg  E/E' avg: 15.5  Lateral E/e': 10.1  Medial E/e': 21.0  RV  S Tomi: 8.4 cm/sec     ______________________________________________________________________________  Report approved by: Kumar Saavedra 06/13/2024 05:15 PM         MR Brain w/o Contrast   Result Value    Radiologist flags Acute intracranial hemorrhage (AA)    Narrative    EXAM: MR BRAIN W/O CONTRAST  LOCATION: [facility]  DATE: 6/14/2024    INDICATION: Suspected acute CVA  COMPARISON: CTA head and neck 6/12/2024  TECHNIQUE: Diffusion-weighted sequences and sagittal T1-weighted images only were obtained. Patient was not able to complete the study.    FINDINGS:  INTRACRANIAL CONTENTS: There is a 9 x 3.4 cm (AP by TR) zone of restricted diffusion at the left temporal/occipital lobes with internal petechial hemorrhage. Cytotoxic edema produces mild mass effect on the trigone and atria of the left lateral   ventricle. There is a 3.5 x 2.6 cm (AP by TR) zone of restricted diffusion in the parasagittal left frontal lobe. No midline shift. No hydrocephalus.        Impression    IMPRESSION:  1.  Moderate-sized evolving infarct in the left temporal/occipital lobes with petechial hemorrhage.  2.  Smaller zone of evolving infarction in the parasagittal left frontal lobe.  3.  Please note that the patient was not able to complete the exam and diffusion and sagittal T1 sequences only are available for interpretation.      [Critical Result: Acute intracranial hemorrhage]    Finding was identified on 6/14/2024 2:34 AM CDT.     1.  Dr. Yarbrough was contacted by me on 6/14/2024 3:15 AM CDT and verbalized understanding of the critical result.

## 2024-06-14 NOTE — PHARMACY-ADMISSION MEDICATION HISTORY
Pharmacist Admission Medication History    Admission medication history is complete. The information provided in this note is only as accurate as the sources available at the time of the update.    Information Source(s): Patient and CareEverywhere/SureScripts via in-person    Pertinent Information: None    Changes made to PTA medication list:  Added: None  Deleted: None  Changed: None    Allergies reviewed with patient and updates made in EHR: yes    Medication History Completed By: Shannan Malhotra RPH 6/13/2024 8:38 PM    No outpatient medications have been marked as taking for the 6/12/24 encounter (Hospital Encounter).

## 2024-06-14 NOTE — PLAN OF CARE
Goal Outcome Evaluation:    Orientation: disoriented x4 severe/ receptive aphasia     Neuros: intact except severe/ receptive aphasia     Vitals/Tele: Vss on ra     IV Access/drains: piv infusing Hep at 1350 units     Diet: no straw, thin liquids, soft and bite size     Mobility SBA/ Independent     GI/Gu: continent voiding in bathroom     Wound/Skin: intact     Consults: neuro following     Discharge Plan: pending     Pt refused ultrasound this AM      See Flow sheets for assessment

## 2024-06-15 ENCOUNTER — APPOINTMENT (OUTPATIENT)
Dept: SPEECH THERAPY | Facility: CLINIC | Age: 64
DRG: 064 | End: 2024-06-15
Attending: PSYCHIATRY & NEUROLOGY
Payer: COMMERCIAL

## 2024-06-15 ENCOUNTER — APPOINTMENT (OUTPATIENT)
Dept: OCCUPATIONAL THERAPY | Facility: CLINIC | Age: 64
DRG: 064 | End: 2024-06-15
Attending: PSYCHIATRY & NEUROLOGY
Payer: COMMERCIAL

## 2024-06-15 ENCOUNTER — APPOINTMENT (OUTPATIENT)
Dept: PHYSICAL THERAPY | Facility: CLINIC | Age: 64
DRG: 064 | End: 2024-06-15
Attending: PSYCHIATRY & NEUROLOGY
Payer: COMMERCIAL

## 2024-06-15 LAB
ANION GAP SERPL CALCULATED.3IONS-SCNC: 13 MMOL/L (ref 7–15)
BUN SERPL-MCNC: 30.1 MG/DL (ref 8–23)
CALCIUM SERPL-MCNC: 8.9 MG/DL (ref 8.8–10.2)
CHLORIDE SERPL-SCNC: 102 MMOL/L (ref 98–107)
CREAT SERPL-MCNC: 1.07 MG/DL (ref 0.67–1.17)
DEPRECATED HCO3 PLAS-SCNC: 24 MMOL/L (ref 22–29)
EGFRCR SERPLBLD CKD-EPI 2021: 77 ML/MIN/1.73M2
ERYTHROCYTE [DISTWIDTH] IN BLOOD BY AUTOMATED COUNT: 13.2 % (ref 10–15)
GLUCOSE BLDC GLUCOMTR-MCNC: 104 MG/DL (ref 70–99)
GLUCOSE BLDC GLUCOMTR-MCNC: 110 MG/DL (ref 70–99)
GLUCOSE BLDC GLUCOMTR-MCNC: 123 MG/DL (ref 70–99)
GLUCOSE BLDC GLUCOMTR-MCNC: 129 MG/DL (ref 70–99)
GLUCOSE BLDC GLUCOMTR-MCNC: 139 MG/DL (ref 70–99)
GLUCOSE SERPL-MCNC: 108 MG/DL (ref 70–99)
HCT VFR BLD AUTO: 44.5 % (ref 40–53)
HGB BLD-MCNC: 14.2 G/DL (ref 13.3–17.7)
INR PPP: 1.15 (ref 0.85–1.15)
INR PPP: 1.2 (ref 0.85–1.15)
MCH RBC QN AUTO: 30.5 PG (ref 26.5–33)
MCHC RBC AUTO-ENTMCNC: 31.9 G/DL (ref 31.5–36.5)
MCV RBC AUTO: 96 FL (ref 78–100)
PLATELET # BLD AUTO: 281 10E3/UL (ref 150–450)
POTASSIUM SERPL-SCNC: 3.4 MMOL/L (ref 3.4–5.3)
RBC # BLD AUTO: 4.65 10E6/UL (ref 4.4–5.9)
SODIUM SERPL-SCNC: 139 MMOL/L (ref 135–145)
UFH PPP CHRO-ACNC: 0.23 IU/ML
UFH PPP CHRO-ACNC: 0.3 IU/ML
WBC # BLD AUTO: 8.6 10E3/UL (ref 4–11)

## 2024-06-15 PROCEDURE — 250N000013 HC RX MED GY IP 250 OP 250 PS 637: Performed by: INTERNAL MEDICINE

## 2024-06-15 PROCEDURE — 92523 SPEECH SOUND LANG COMPREHEN: CPT | Mod: GN | Performed by: SPEECH-LANGUAGE PATHOLOGIST

## 2024-06-15 PROCEDURE — 85610 PROTHROMBIN TIME: CPT | Performed by: INTERNAL MEDICINE

## 2024-06-15 PROCEDURE — 99232 SBSQ HOSP IP/OBS MODERATE 35: CPT | Performed by: NURSE PRACTITIONER

## 2024-06-15 PROCEDURE — 250N000011 HC RX IP 250 OP 636: Mod: JZ | Performed by: INTERNAL MEDICINE

## 2024-06-15 PROCEDURE — 97116 GAIT TRAINING THERAPY: CPT | Mod: GP

## 2024-06-15 PROCEDURE — 85520 HEPARIN ASSAY: CPT | Performed by: INTERNAL MEDICINE

## 2024-06-15 PROCEDURE — 250N000013 HC RX MED GY IP 250 OP 250 PS 637: Performed by: PHYSICIAN ASSISTANT

## 2024-06-15 PROCEDURE — 99233 SBSQ HOSP IP/OBS HIGH 50: CPT | Performed by: INTERNAL MEDICINE

## 2024-06-15 PROCEDURE — 36415 COLL VENOUS BLD VENIPUNCTURE: CPT | Performed by: INTERNAL MEDICINE

## 2024-06-15 PROCEDURE — 92507 TX SP LANG VOICE COMM INDIV: CPT | Mod: GN | Performed by: SPEECH-LANGUAGE PATHOLOGIST

## 2024-06-15 PROCEDURE — 80048 BASIC METABOLIC PNL TOTAL CA: CPT | Performed by: INTERNAL MEDICINE

## 2024-06-15 PROCEDURE — 97535 SELF CARE MNGMENT TRAINING: CPT | Mod: GO

## 2024-06-15 PROCEDURE — 85027 COMPLETE CBC AUTOMATED: CPT | Performed by: INTERNAL MEDICINE

## 2024-06-15 PROCEDURE — 120N000001 HC R&B MED SURG/OB

## 2024-06-15 PROCEDURE — 250N000013 HC RX MED GY IP 250 OP 250 PS 637: Performed by: NURSE PRACTITIONER

## 2024-06-15 PROCEDURE — 92526 ORAL FUNCTION THERAPY: CPT | Mod: GN | Performed by: SPEECH-LANGUAGE PATHOLOGIST

## 2024-06-15 RX ORDER — CARVEDILOL 6.25 MG/1
6.25 TABLET ORAL 2 TIMES DAILY WITH MEALS
Status: DISCONTINUED | OUTPATIENT
Start: 2024-06-15 | End: 2024-06-17

## 2024-06-15 RX ORDER — WARFARIN SODIUM 5 MG/1
5 TABLET ORAL
Status: COMPLETED | OUTPATIENT
Start: 2024-06-15 | End: 2024-06-15

## 2024-06-15 RX ORDER — IPRATROPIUM BROMIDE AND ALBUTEROL SULFATE 2.5; .5 MG/3ML; MG/3ML
3 SOLUTION RESPIRATORY (INHALATION) EVERY 4 HOURS PRN
Status: DISCONTINUED | OUTPATIENT
Start: 2024-06-15 | End: 2024-06-20 | Stop reason: HOSPADM

## 2024-06-15 RX ORDER — ENOXAPARIN SODIUM 150 MG/ML
1 INJECTION SUBCUTANEOUS EVERY 12 HOURS
Status: DISCONTINUED | OUTPATIENT
Start: 2024-06-15 | End: 2024-06-18

## 2024-06-15 RX ADMIN — EMPAGLIFLOZIN 10 MG: 10 TABLET, FILM COATED ORAL at 12:48

## 2024-06-15 RX ADMIN — FUROSEMIDE 40 MG: 10 INJECTION, SOLUTION INTRAMUSCULAR; INTRAVENOUS at 08:34

## 2024-06-15 RX ADMIN — WARFARIN SODIUM 5 MG: 5 TABLET ORAL at 17:56

## 2024-06-15 RX ADMIN — ATORVASTATIN CALCIUM 40 MG: 40 TABLET, FILM COATED ORAL at 20:14

## 2024-06-15 RX ADMIN — ENOXAPARIN SODIUM 135 MG: 150 INJECTION SUBCUTANEOUS at 20:14

## 2024-06-15 RX ADMIN — HEPARIN SODIUM 1500 UNITS/HR: 10000 INJECTION, SOLUTION INTRAVENOUS at 01:30

## 2024-06-15 RX ADMIN — CARVEDILOL 6.25 MG: 6.25 TABLET, FILM COATED ORAL at 17:56

## 2024-06-15 RX ADMIN — CARVEDILOL 3.12 MG: 3.12 TABLET, FILM COATED ORAL at 08:33

## 2024-06-15 RX ADMIN — LISINOPRIL 5 MG: 2.5 TABLET ORAL at 08:33

## 2024-06-15 RX ADMIN — HEPARIN SODIUM 1650 UNITS/HR: 10000 INJECTION, SOLUTION INTRAVENOUS at 14:38

## 2024-06-15 ASSESSMENT — ACTIVITIES OF DAILY LIVING (ADL)
ADLS_ACUITY_SCORE: 29
ADLS_ACUITY_SCORE: 24
ADLS_ACUITY_SCORE: 24
ADLS_ACUITY_SCORE: 29
ADLS_ACUITY_SCORE: 24
ADLS_ACUITY_SCORE: 29
ADLS_ACUITY_SCORE: 24
ADLS_ACUITY_SCORE: 24
ADLS_ACUITY_SCORE: 29
ADLS_ACUITY_SCORE: 24
ADLS_ACUITY_SCORE: 29
ADLS_ACUITY_SCORE: 24
ADLS_ACUITY_SCORE: 29
DEPENDENT_IADLS:: INDEPENDENT
ADLS_ACUITY_SCORE: 24

## 2024-06-15 NOTE — PROGRESS NOTES
[facility]    Stroke Progress Note    Interval Events  Noted degree of improvement in mixed aphasia on examination today; some dificulty with answering questoins and naming but is able to answer more questions than previously and now able to follow all commands. Field cut seems improved to resolved. Spoke with cardiology who are okay with coumadin + lovenox bridge (rather than DOAC); coumadin + bridge is preferred from stroke standpoint-discussed with hospitalist as well.     HPI Summary  Cirilo Gardner is a 63 year old male who has not seen a doctor in many years, not on any medication PTA. He was brought to the ED 6/12 after he was on the phone with someone who noted him to be confused and notified EMS. In the ED he was noted to have mixed expressive/receptive language deficits and R visual field cut. On further questioning of the patient he was able to provide history that he woke up around 0300 feeling ok, around 0800 he felt that something wasn't right and by 1000 something was definitely not right. TNK not given due to presentation outside the conventional treatment window. No thrombectomy given absence of proximal vessel occlusion.  MRI with L temporal/occipital and frontal infarcts.     Stroke Evaluation Summarized     MRI/Head CT CT 6/12: No acute intercranial hemorrhage  CT 6/14: evolving L temporal/occipital infarct with small areas of petechial hemorrhage, small amount SAH in L temporal/occipital region, mild mass effect on posterior L lateral ventricle without hydrocephalus or entrapment      MRI: subacute infarct of L temporal/occipital and L parasagittal frontal regions, petechial hemorrhage of temporal/occipital infarct (only DWI and sagittal T1 sequences completed)    Intracranial Vasculature CTA: reduced arborization inferior L MCA M3, no clear LVO   Cervical Vasculature CTA: Lack flow L vertebral artery, age indeterminate,  R ICA short segment <50% narrowing,  Soft plaque L ICA with mild stenosis      Carotid US:  1. Less than 50% diameter stenosis of the right ICA relative to the  distal ICA diameter.   2. Less than 50% diameter stenosis of the left ICA relative to the  distal ICA diameter.   3. Occlusion of the left vertebral artery, unchanged when compared to  the prior CT from 6/12/2024.             Echocardiogram LVEF 15-20%, mild to moderate concentric LVH, severe global hypokinesia  of LV, apical L ventricular thrombus 1.5x1.9cm, moderate biatrial enlargement    EKG/Telemetry Sinus Tach with PVCs   Other Testing Perfusion: decreased perfusion L temporal and L occipital lobe dec blood flow, Dec volume      LDL  LDL  82   A1C  6/12/2024: 6.4 %   Troponin 6/12/2024: 83 ng/L       Impression   L frontal, temporal and occipital infarcts due to cardioembolism   Apical thrombus, LVEF 15-20%, severe global hypokinesia   L ICA soft plaque -not felt to be symptomatic   L vert occlusion, unknown chronicity  Mild mass effect     Plan  - Neurochecks and Vital Signs every 4hrs  - Inpatient BP goal 120-160   - recommend discontinuation of heparin and transition to coumadin with lovenox bridge   - Statin: atorvastatin 40mg PO daily, LDL goal 40-70  - Telemetry  - Bedside Glucose Monitoring  - A1c 6.4% is consistent with diagnosis of pre-diabetes - management/education per primary team. Goal <7%  - PT/OT/SLP consulted. Should not drive due to visual field cut.  - Stroke Education  - Euthermia, Euglycemia, Eunatremic  - Set up for checking blood pressures at home daily   - cardiology following     Patient Follow-up    - in 6-8 weeks with general neurology or stroke ANDREW ([phone]), ordered   -14 day cardiac event monitor (Zio Patch) to be mailed to patient, ordered   -sleep referral for evaluation of possible YENNIFER, ordered     We will continue to follow.       DEBRA Lyon CNP  Vascular Neurology    To page me or covering stroke neurology team member, click here:  "[facility]  Choose \"On Call\" tab at top, then select \"NEUROLOGY/ALL SITES\" from middle drop-down box, press Enter, then look for \"stroke\" or \"telestroke\" for your site.  ______________________________________________________    Clinically Significant Risk Factors                   # Acute heart failure with reduced ejection fraction: last echo with EF <40% and receiving IV diuretics             # Obesity: Estimated body mass index is 38.57 kg/m  as calculated from the following:    Height as of this encounter: 1.829 m (6').    Weight as of this encounter: 129 kg (284 lb 6.3 oz)., PRESENT ON ADMISSION              Medications   Scheduled Meds  Current Facility-Administered Medications   Medication Dose Route Frequency Provider Last Rate Last Admin    atorvastatin (LIPITOR) tablet 40 mg  40 mg Oral QPM Radha Haney PA-C   40 mg at 06/14/24 2009    carvedilol (COREG) tablet 3.125 mg  3.125 mg Oral BID w/meals Louisa Townsend DO   3.125 mg at 06/14/24 1842    furosemide (LASIX) injection 40 mg  40 mg Intravenous Daily Ramiro Sung MD   40 mg at 06/14/24 0952    insulin aspart (NovoLOG) injection (RAPID ACTING)  1-7 Units Subcutaneous TID AC Louisa Townsend DO        insulin aspart (NovoLOG) injection (RAPID ACTING)  1-5 Units Subcutaneous At Bedtime Louisa Townsend DO        lisinopril (ZESTRIL) tablet 5 mg  5 mg Oral Daily Louisa Townsend DO   5 mg at 06/14/24 0942    sodium chloride (PF) 0.9% PF flush 3 mL  3 mL Intracatheter Q8H Cameron Frederick PA-C   3 mL at 06/14/24 0954       Infusion Meds  Current Facility-Administered Medications   Medication Dose Route Frequency Provider Last Rate Last Admin    heparin 25,000 units in 0.45% NaCl 250 mL ANTICOAGULANT infusion  0-5,000 Units/hr Intravenous Continuous Louisa Townsend DO 16.5 mL/hr at 06/15/24 0651 1,650 Units/hr at 06/15/24 0651    medication instruction - No oral meds if patient didn't pass dysphagia " screen   Does not apply Continuous PRN Cameron Frederick PA-C        Medication Instructions - Avoid dextrose in IV solutions.   Intravenous Continuous PRN Cameron Frederick PA-C           PRN Meds  Current Facility-Administered Medications   Medication Dose Route Frequency Provider Last Rate Last Admin    acetaminophen (TYLENOL) Suppository 650 mg  650 mg Rectal Q4H PRN Cameron Frederick PA-C        glucose gel 15-30 g  15-30 g Oral Q15 Min PRN Cameron Frederick PA-C        Or    dextrose 50 % injection 25-50 mL  25-50 mL Intravenous Q15 Min PRN Cameron Frederick PA-C        Or    glucagon injection 1 mg  1 mg Subcutaneous Q15 Min PRN Cameron Frederick PA-C        labetalol (NORMODYNE/TRANDATE) injection 10-20 mg  10-20 mg Intravenous Q10 Min PRN Cameron Frederick PA-C        Or    hydrALAZINE (APRESOLINE) injection 10-20 mg  10-20 mg Intravenous Q1H PRN Cameron Frederick PA-C        lidocaine (LMX4) cream   Topical Q1H PRN Cameron Frederick PA-C        lidocaine 1 % 0.1-1 mL  0.1-1 mL Other Q1H PRN Cameron Frederick PA-C        medication instruction - No oral meds if patient didn't pass dysphagia screen   Does not apply Continuous PRN Cameron Frederick PA-C        Medication Instructions - Avoid dextrose in IV solutions.   Intravenous Continuous PRN Cameron Frederick PA-C        ondansetron (ZOFRAN ODT) ODT tab 4 mg  4 mg Oral Q6H PRN Cameron Frederick PA-C        Or    ondansetron (ZOFRAN) injection 4 mg  4 mg Intravenous Q6H PRN Cameron Frederick PA-C        sodium chloride (PF) 0.9% PF flush 3 mL  3 mL Intracatheter q1 min prn Cameron Frederick PA-C   3 mL at 06/13/24 0020          PHYSICAL EXAMINATION  Temp:  [98  F (36.7  C)-99.5  F (37.5  C)] 98.4  F (36.9  C)  Pulse:  [75-94] 75  Resp:  [18] 18  BP: (123-148)/() 134/84  SpO2:  [93 %-98 %] 93 %      General Exam  General:  patient  sitting in chair without any acute distress    HEENT:  normocephalic/atraumatic  Pulmonary:  no respiratory distress      Neuro Exam  Mental Status:  alert, able to state he is in [facility] but unable to state, city or age; follow all commands including two step commands, moderate expressive aphasia, mild receptive aphasia   Cranial Nerves: no clear field cut on exam today, PER, EOMI with normal smooth pursuit, facial sensation intact and symmetric, facial movements symmetric, hearing not formally tested but intact to conversation, no dysarthria  Motor:  normal muscle tone and bulk, no abnormal movements, able to move all limbs spontaneously, strength 5/5 throughout upper and lower extremities, no pronator drift  Sensory:  light touch sensation intact and symmetric throughout upper and lower extremities, no extinction on double simultaneous stimulation   Coordination:  no ataxia identified  Station/Gait:  deferred    Stroke Scales    NIHSS  1a. Level of Consciousness 0-->Alert, keenly responsive   1b. LOC Questions 2-->Answers neither question correctly   1c. LOC Commands 0-->Performs both tasks correctly   2.   Best Gaze 0-->Normal   3.   Visual 0-->No visual loss   4.   Facial Palsy 0-->Normal symmetrical movements   5a. Motor Arm, Left 0-->No drift, limb holds 90 (or 45) degrees for full 10 secs   5b. Motor Arm, Right 0-->No drift, limb holds 90 (or 45) degrees for full 10 secs   6a. Motor Leg, Left 0-->No drift, leg holds 30 degree position for full 5 secs   6b. Motor Leg, right 0-->No drift, leg holds 30 degree position for full 5 secs   7.   Limb Ataxia 0-->Absent   8.   Sensory 0-->Normal, no sensory loss   9.   Best Language 1-->Mild-to-moderate aphasia, some obvious loss of fluency or facility of comprehension, without significant limitation on ideas expressed or form of expression. Reduction of speech and/or comprehension, however, makes conversation. . . (see row details)   10. Dysarthria  "0-->Normal   11. Extinction and Inattention  0-->No abnormality   Total 3 (06/15/24 1244)       Imaging  I personally reviewed all imaging; relevant findings per HPI.     Lab Results Data   CBC  Recent Labs   Lab 06/15/24  0612 06/14/24  1103 06/13/24  0742   WBC 8.6 10.6 9.8   RBC 4.65 5.12 5.26   HGB 14.2 16.1 16.1   HCT 44.5 48.7 50.2    334 349     Basic Metabolic Panel    Recent Labs   Lab 06/15/24  0612 06/15/24  0126 06/14/24  2119 06/14/24  1231 06/14/24  1103 06/13/24  0752 06/13/24  0742     --   --   --  138  --  140   POTASSIUM 3.4  --   --   --  4.1  --  3.9   CHLORIDE 102  --   --   --  102  --  104   CO2 24  --   --   --  21*  --  21*   BUN 30.1*  --   --   --  30.0*  --  22.2   CR 1.07  --   --   --  1.02  --  0.85   * 123* 113*   < > 121*   < > 136*   PARISH 8.9  --   --   --  9.6  --  9.7    < > = values in this interval not displayed.     Liver Panel  No results for input(s): \"PROTTOTAL\", \"ALBUMIN\", \"BILITOTAL\", \"ALKPHOS\", \"AST\", \"ALT\", \"BILIDIRECT\" in the last 168 hours.  INR    Recent Labs   Lab Test 06/12/24  1408   INR 1.14      Lipid Profile    Recent Labs   Lab Test 06/13/24  0742   CHOL 135   HDL 41   LDL 82   TRIG 58     A1C    Recent Labs   Lab Test 06/12/24  1408   A1C 6.4*     Troponin    Recent Labs   Lab 06/12/24 2226 06/12/24 2015 06/12/24  1823   CTROPT 83* 83* 78*          Data     I have personally spent a total of 40 minutes providing care today, time spent in reviewing medical records and devising the plan as recorded above.   "

## 2024-06-15 NOTE — PROGRESS NOTES
06/15/24 0927   Appointment Info   Signing Clinician's Name / Credentials (SLP) Clarita Metzger MS CCC-SLP   General Information   Onset of Illness/Injury or Date of Surgery 06/12/24   Referring Physician Redd Fairchild   Patient/Family Therapy Goal Statement (SLP) Pt appeared motivated to improve speech/language function and return home when able; pt acknowledged understanding of TCU recommendation, though based on scoring on testing, question true comprehension.   Pertinent History of Current Problem Per notes; This is a 63-year-old male who presented to the hospital with confusion and EMS was notified around 1330 and in the ED was found to have mixed expressive/receptive language deficits and right-sided visual field cut as per neurology. Left MCA stroke.   General Observations Pt alert in chair and finishing breakfast as SLP arrived   Type of Evaluation   Type of Evaluation Speech, Language, Cognition   Vocal Quality/Secretion Management (Oral Motor)   Vocal Quality (Oral Motor) WNL   General Swallowing Observations   Respiratory Support room air   Current Diet/Method of Nutritional Intake (General Swallowing Observations, NIS) soft and bite-sized (dysphagia advanced) (level 6);thin liquids (level 0)   Motor Speech   Speech Intelligibility (Motor Speech) WNL   Western Aphasia Battery- Revised Bedside Record From   Spontaneous Speech Content Score (out of 10) 3   Spontaneous Speech Fluency Score (out of 10) 4   Auditory Verbal Comprehension Score (out of 10) 2   Sequential Commands Score (out of 10) 2   Repetition Score (out of 10) 6   Object Naming Score (out of 10) 0   Bedside Aphasia Sum 17   WAB-R Bedside Aphasia Score 28.33   Reading Score (out of 10) 0   Writing Score (out of 10) 0   Bedside Language Sum 17   Bedside Language Score 21.25   Bedside Aphasia Classification Global Aphasia   Aphasia Severity Level Severe Aphasia   Clinical Impression   Clinical Impression Comments Pt completed WAB-R bedside  aphasia assessment with total score of 28, indicating severe, global aphasia. Pt with strengths in repetition and good attention to task. Though unable to be scored on assessment, noted that pt did describe objects/his wants and needs and used gestures/body language very well. Receptive deficits in complex yes/no questions (does March come before June), and pt unable to complete two-step commands despite max cues. Expressively, pt able to repeat back with paraphasias. Pt unable to independently name objects.   SLP Goals   Therapy Frequency (SLP Eval) daily   SLP Predicted Duration/Target Date for Goal Attainment 06/29/24   SLP Goals Swallow;Communication   SLP: Safely tolerate diet without signs/symptoms of aspiration Regular diet;Thin liquids;With use of swallow precautions;With assistance/supervision   SLP: Communicate basic wants and needs minimal assist;using communication board;verbal and written   Interventions   Interventions Quick Adds Speech, Language, Voice & Communication   Swallowing Dysfunction &/or Oral Function for Feeding   Treatment of Swallowing Dysfunction &/or Oral Function for Feeding Minutes (00935) 15   Symptoms Noted During/After Treatment None   Treatment Detail/Skilled Intervention Pt self feeding breakfast tray with fast rate and large bites. Pt followed cues well to alternate solids and liquids, though cough x1 noted as pt likely pocketing/pharyngeal residue. Pt tolerated pureed solids well. Current diet and close supervision appears to be most appropriate.   Speech, Language, Voice Communication&/or Auditory Processing   Treatment of Speech, Language, Voice Communication&/or Auditory Processing Minutes (92084) 10   Symptoms Noted During/After Treatment None   Treatment Detail/Skilled Intervention Started assessing what cuing and strategies would be beneficial/pt stimulable for. Pt not stimulable for phonemic cues. Repetition was the only effective cue. Pt was stimulable to describe  objects and used gestures to help convey object's purpose.   SLP Discharge Planning   SLP Plan meal f/u level 6 and thin diet advance as tolerated; basic to mod level receptive and expressive language tasks   SLP Discharge Recommendation Acute [facility]hab Center-Motivated patient will benefit from intensive, interdisciplinary therapy.  Anticipate will be able to tolerate 3 hours of therapy per day;Transitional Care Facility   SLP Rationale for DC Rec Patient's swallow and communication are well below his baseline. Given severity of both receptive and expressive language skills, anticipate pt will need a longer course of therapy to be able to communicate basic wants/needs after discharge. If pt has 24 hour assist with all communication tasks and ability to attend OP SLP, ARU would be appropriate. If support isn't in place, TCU would be the best option.   SLP Brief overview of current status  Severe receptive and expressive aphasia on standardized testing this morning; continue soft and bite sized diet (IDDSI level 6) with thin liquids and close supervision to slow down/smaller bites and alternate solids and liquids   Total Session Time   Total Session Time (sum of timed and untimed services) 25

## 2024-06-15 NOTE — PLAN OF CARE
Patient here with left MCA CVA with moderate expressive/receptive aphasia. No visual field cut noted. Able to follow all commands today; 5/5 strength and CMS intact. +2-3 edema in BLE. Patient able to communicate slightly better than yesterday and appears to understand more of what RN is saying; answering yes/no appropriately during assessments. Intermittently able to follow finger/nose - slow and deliberate with command. VSS. Tele NSR. Expiratory wheezing noted after ambulation. Prn neb ordered. Upon reassessment wheezing had cleared. No prn given at this time. Denies SOB. Denies pain. Up with SBA w/ GB. No dizziness witnessed this shift. Tolerating easy to chew diet; per SLP, patient to remain on this for today. Sitter discontinued at 1100 today. Discharge plan pending, therapies recommending TCU.    Plan to bridge to Coumadin tonight. First dose given at 1800. Will bridge with Lovenox. Heparin to be stopped at 1900 tonight per order.

## 2024-06-15 NOTE — PLAN OF CARE
[id number]: Patient here with Left MCA CVA. Start of shift patient was found naked in bathroom; tele removed and PIV taken out. Patient appeared agitated and frustrated about not being able to leave hospital. Redirection provided and effective for time being. MD updated; sitter ordered and in place for redirection. Effective. Able to redress and get new PIV in place. STAT CT completed prior to restarting heparin drip. Confirmed by neurostroke, okay to resume. Patient continues to experience expressive/receptive aphasia. Receptive aphasia appears to be improving slightly although he is unable to communicate most of what he wants, getting caught up on words and talking about unrelated topics to questions. CMS intact. VSS with SBP < 160 throughout entire shift. Tele NSR/ST. Ultrasound to carotids completed; see results. Cardiology and neurostroke following. Tolerating soft bite/sized diet with thin liquids, no straw. Up with SBA when in room. Steady on feet. Brother and friend, Jo-Ann updated today. Discharge plan pending, therapies recommending TCU.    [id number]: Patient able to follow heel/shin better this portion of shift. Able to touch finger to nose, but not finger to RN's finger. Expressive aphasia slightly better and a lot calmer throughout. Able to state birthday and last name. Neuros otherwise unchanged. VSS. Heparin continues to infuse; next Hep10A ordered for 2316.

## 2024-06-15 NOTE — PHARMACY-ANTICOAGULATION SERVICE
Clinical Pharmacy - Warfarin Dosing Consult     Pharmacy has been consulted to manage this patient s warfarin therapy.  Indication: Other - specify in comments (LV thrombus)  Therapy Goal: INR 2-3  Warfarin Prior to Admission: No    INR   Date Value Ref Range Status   06/15/2024 1.20 (H) 0.85 - 1.15 Final   06/12/2024 1.14 0.85 - 1.15 Final       Recommend warfarin 5 mg today.  Pharmacy will monitor Cirilo Gardner daily and order warfarin doses to achieve specified goal.      Please contact pharmacy as soon as possible if the warfarin needs to be held for a procedure or if the warfarin goals change.

## 2024-06-15 NOTE — CONSULTS
Care Management Initial Consult    General Information  Assessment completed with: Pratik Morales  Type of CM/SW Visit: Initial Assessment    Primary Care Provider verified and updated as needed: No (Patient does not have a primary)   Readmission within the last 30 days: no previous admission in last 30 days      Reason for Consult: discharge planning  Advance Care Planning:            Communication Assessment  Patient's communication style: spoken language (English or Bilingual)    Hearing Difficulty or Deaf: no   Wear Glasses or Blind: no    Cognitive  Cognitive/Neuro/Behavioral: .WDL except, speech  Level of Consciousness: alert, intermittent confusion  Arousal Level: opens eyes spontaneously  Orientation: oriented x 4 (when given yes/no choices; said hospital)  Mood/Behavior: calm, cooperative  Best Language: 1 - Mild to moderate (improving from yesterday)  Speech: clear (WFD)    Living Environment:   People in home: alone     Current living Arrangements: house      Able to return to prior arrangements: yes       Family/Social Support:  Care provided by: self  Provides care for: no one  Marital Status: Single  Children          Description of Support System: Supportive    Support Assessment: Adequate family and caregiver support    Current Resources:   Patient receiving home care services: No     Community Resources: None  Equipment currently used at home: none  Supplies currently used at home:      Employment/Financial:  Employment Status: retired        Financial Concerns:             Does the patient's insurance plan have a 3 day qualifying hospital stay waiver?  No    Lifestyle & Psychosocial Needs:  Social Determinants of Health     Food Insecurity: Not on file   Depression: Not on file   Housing Stability: Not on file   Tobacco Use: Not on file   Financial Resource Strain: Not on file   Alcohol Use: Not on file   Transportation Needs: Not on file   Physical Activity: Not on file   Interpersonal Safety:  Not on file   Stress: Not on file   Social Connections: Not on file   Health Literacy: Not on file       Functional Status:  Prior to admission patient needed assistance:   Dependent ADLs:: Independent  Dependent IADLs:: Independent       Additional Information:  Writer received consult for discharge planning for patient. Writer spoke with patient's son Pratik. Cirilo Gardner is a 63 year old male admitted on 6/12/2024 due to suspected acute posterior left MCA infarction with symptoms of mixed expressive/receptive aphasia and right visual filed cut.  Pratik had a sitter originally today but now he is off the sitter. Patient will need to be off the sitter for 24 hours prior to be considered by TCU. Patient was independent prior to coming into the hospital and lived alone. Patient did not use any assistive devices. Writer was able to confirm address, insurance, and contacts. Patient does not have PCP as he does not typically go to the doctor. Will continue to follow and will ask for TCU choices tomorrow when meeting with son in person.     Prem Sanchez [facility]  Care Management

## 2024-06-15 NOTE — PLAN OF CARE
Reason for Admission: L MCA CVA    Cognitive/Mentation: disoriented x4  able to say  only (receptive/expressive aphasia)  Neuros/CMS: Intact ex receptive/expressive aphasia; able to touch his nose for finger to nose but not able to touch RN's finger  VS: stable on RA. Keep SBP <160  Tele: SR/ST.  /GI: Continent. Last BM .   Pulmonary: LS clear.  Pain: denies.     Drains/Lines: G20 L wrist-infusing Heparin at 1650u/hr  Skin: intact  Activity: SBA  with GB in room, follow with WC when ambulating outside of the room  Diet: soft/bite size diet and thin liquids. Takes pills whole.     Therapies recs:  PT/OT/SLP  Discharge: ARU    Aggression Stoplight Tool: green    End of shift summary: calm and cooperative during the night. 3000units Heparin bolus given over 5min  at 0655hrs and rate increased to 1650units/hrs. HepXa at 1246pm.

## 2024-06-15 NOTE — PROGRESS NOTES
[facility] Cardiology Progress Note  Date of Service: 06/15/2024      Assessment & Plan   Cirilo Gardner is a 63 year old male admitted on 6/12/2024 with CVA.     Assessment:   Severe LV systolic dysfunction (newly diagnosed), ejection fraction 15 to 20%  LV apical thrombus measuring 1.5 x 1.9 cm  Acute left MCA stroke likely due to #2  Severe expressive/receptive aphasia  Confusion  Heart failure with reduced ejection fraction  Mildly elevated troponin without delta, likely demand  Obesity    The patient is unable to provide much history given severe aphasia.  Per report no prior medical history although he has not seen a physician in a long time.  The cause of his severe cardiomyopathy is unclear at this point.  Given risk factors, will need to rule out coronary disease.  Therefore he will need right and left heart catheterization at some point.  He currently does not have decision-making capacity therefore unable to consent to any procedures.  No family with him at this point    Recommend initiation of anticoagulation given LV thrombus.  Recommend starting with heparin and transition to DOAC prior to discharge from the hospital.  He was initiated on IV diuresis, though no documented output. Weight up 7lbs?  I suspect he will need IV diuresis a few more days before transitioning to oral. Recommend strict I&O's and daily weights. Will increase carvedilol to 6.25 mg BID. Additionally, I will add SGLT2i to further optimize his GDMT. I will stop his lisinopril for 36 hour washout with plans to start Entresto in 1-2 days. Pharmacy liaison consult placed. Consider CORE consult prior to discharge.     He will need RHC and coronary angiogram and possibly cMRI once able to follow commands/consent, inpatient vs outpatient.       Margarita Marvin, CNP  Pager:  [phone]  (7am - 5pm, M-F)      Physical Exam   Temp: 98.2  F (36.8  C) Temp src: Oral BP: (!) 128/91 (BP was checkd  2x, notified RN) Pulse: 79   Resp: 18 SpO2: 97 % O2 Device: None (Room air)    Vitals:    06/12/24 1406 06/12/24 1700 06/13/24 0700   Weight: 127 kg (279 lb 15.8 oz) 126 kg (277 lb 12.5 oz) 129 kg (284 lb 6.3 oz)       Constitutional:   NAD   Skin:   Warm and dry   Head:   Nontraumatic   Neck:   no JVD   Lungs:   Diminished at bases   Cardiovascular:   regular rate and rhythm   Abdomen:   Benign   Extremities and Back:   3+ edema    Neurological:   Grossly nonfocal       Medications   Current Facility-Administered Medications   Medication Dose Route Frequency Provider Last Rate Last Admin    heparin 25,000 units in 0.45% NaCl 250 mL ANTICOAGULANT infusion  0-5,000 Units/hr Intravenous Continuous Louisa Townsend DO 16.5 mL/hr at 06/15/24 0651 1,650 Units/hr at 06/15/24 0651    medication instruction - No oral meds if patient didn't pass dysphagia screen   Does not apply Continuous PRN Cameron Frederick PA-C        Medication Instructions - Avoid dextrose in IV solutions.   Intravenous Continuous PRN Cameron Frederick PA-C         Current Facility-Administered Medications   Medication Dose Route Frequency Provider Last Rate Last Admin    atorvastatin (LIPITOR) tablet 40 mg  40 mg Oral QPM Radha Haney PA-C   40 mg at 06/14/24 2009    carvedilol (COREG) tablet 3.125 mg  3.125 mg Oral BID w/meals Louisa Townsend DO   3.125 mg at 06/15/24 0833    furosemide (LASIX) injection 40 mg  40 mg Intravenous Daily Ramiro Sung MD   40 mg at 06/15/24 0834    insulin aspart (NovoLOG) injection (RAPID ACTING)  1-7 Units Subcutaneous TID AC Louisa Townsend DO        insulin aspart (NovoLOG) injection (RAPID ACTING)  1-5 Units Subcutaneous At Bedtime Louisa Townsend DO        lisinopril (ZESTRIL) tablet 5 mg  5 mg Oral Daily Louisa Townsend DO   5 mg at 06/15/24 0833    sodium chloride (PF) 0.9% PF flush 3 mL  3 mL Intracatheter Q8H Cameron Frederick PA-C    3 mL at 06/14/24 0954       Data     Most Recent 3 CBC's:  Recent Labs   Lab Test 06/15/24  0612 06/14/24  1103 06/13/24  0742   WBC 8.6 10.6 9.8   HGB 14.2 16.1 16.1   MCV 96 95 95    334 349     Most Recent 3 BMP's:  Recent Labs   Lab Test 06/15/24  0806 06/15/24  0612 06/15/24  0126 06/14/24  1231 06/14/24  1103 06/13/24  0752 06/13/24  0742   NA  --  139  --   --  138  --  140   POTASSIUM  --  3.4  --   --  4.1  --  3.9   CHLORIDE  --  102  --   --  102  --  104   CO2  --  24  --   --  21*  --  21*   BUN  --  30.1*  --   --  30.0*  --  22.2   CR  --  1.07  --   --  1.02  --  0.85   ANIONGAP  --  13  --   --  15  --  15   PARISH  --  8.9  --   --  9.6  --  9.7   * 108* 123*   < > 121*   < > 136*    < > = values in this interval not displayed.     Most Recent 3 Troponin's:No lab results found.  Most Recent 3 BNP's:  Recent Labs   Lab Test 06/12/24  1408   NTBNPI 12,210*         Medical Decision Making       30 MINUTES SPENT BY ME on the date of service doing chart review, history, exam, documentation & further activities per the note.        Clinically Significant Risk Factors                   # Acute heart failure with reduced ejection fraction: last echo with EF <40% and receiving IV diuretics             # Obesity: Estimated body mass index is 38.57 kg/m  as calculated from the following:    Height as of this encounter: 1.829 m (6').    Weight as of this encounter: 129 kg (284 lb 6.3 oz)., PRESENT ON ADMISSION

## 2024-06-15 NOTE — PROGRESS NOTES
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 She noticed that Cirilo had leg swelling started 3 weeks ago. 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 Found to have systolic heart failure and apical thombus likely etiology of stroke.    Acute ischemic stroke of posterior left MCA presumed 2/2 LV thrombus  Petechial hemorrhage on MRI  Mixed expressive/receptive aphasia  Right visual field cut  *Brought to ED via EMS for evaluation of altered mental status.  At 1400 he was found sitting in his car outside of his home, he had happened to be on the phone speaking with a client who became concerned as the patient seemed confused so the client called 911. Was later able to tell stroke staff that he woke up around 0400 feeling ok, around 0800 he felt that something wasn't right and by 1000 something was \"definitely not right\" though was unable to further characterize what was feeling off.   *In the ED, was afebrile, tachycardic and hypertensive with SBPs 170s. Was noted to have expressive/receptive deficits and right visual field cut.  Code stroke was called.  Labs notable for WBC 11.4, lytes/Cr stable, ,  proBNP 12k, trops elevated (57 -- 83). EKS showed ST with PVCs, possible LAE; no ST/T changes. Head CT neg. CTA head/neck showed an age-indeterminate lack of flow in the left vertebral artery of uncertain " etiology and mild atherosclerotic narrowing  at the R ICA origin but was otherwise nl. CT perfusion study showed delayed contrast arrival in the area of the posterolateral aspect of the left cerebral hemisphere involving portions of the left temporal and left occipital lobes; findings suggestive of infarct.   *Routine stroke labs obtained -- A1c 6.4, FLP with tot cholest 135, HDL 41, LDL 82.   *Seen by stroke team. Felt to be out of the window for thrombolytics. Given ASA and Plavix load (, Plavix 300). Recommended to start DAPT with ASA 325mg and Plavix 75mg. Also started on atorvastatin 40mg daily.   *Echo obtained and showed findings of an apical LV thrombus.  Findings were discussed with stroke service.  Aspirin and Plavix were stopped and he was placed on a low intensity heparin drip on 6/13 PM.  *MRI brain finally obtained on 6/14 at 0100 and showed a moderate-sized evolving infarct in the temporal/occipital lobes with petechial hemorrhage and a similar zone of evolving infarct in the parasagittal left frontal lobe.  Stroke neurology service was made aware of these findings given recent initiation of anticoagulation.  Was continued on heparin drip.   Bedside sitter ordered on 6/14 AM after patient attempted to leave AMA x2. Less confused, less aphasic on 6/15, stop bedside sitter.  - Appreciate Neurology consultation  - conts on heparin gtt today, consider transition to apixaban tomorrow  - cont atorvastatin 40mg daily  - tighten BP control, goal -160  - PT/OT, on modified diet per SLP    Acute LV thrombus  Newly diagnosed systolic CHF, chronicity unclear  NSTEMI, suspect type II dt demand ischemia in setting of CVA  Pulmonary hypertension  No known hx of CAD, CHF. On admission this stay, noted to have bilateral LE edema, patient mentioned had been there for several days though unclear in regards to the specifics. Trop trend as above. ProBNP significant elevated.  EKG nonischemic. No reports of  chest pain.  *Echo obtained this stay and showed EF 15-20% with severe global hypokinesia of the LV and an apical LV thrombus. LV apex clot 1.5x1.9cm, non-mobile. Also noted to have findings of biatrial enlargement, mod decreased RVSF and mod pulmonary hypertension.   - appreciate Cardiology consult   - ischemic workup pending   - IV diuresis  - placed on heparin gtt on 6/13 as above  - okay for tighter BP control per stroke team --  started on lisinopril 5mg daily and Coreg 3.125mg BID    Prediabetes  A1c 6.4 this stay.   - using sliding scale insulin while hospitalized  - follow up with PCP after discharge for ongoing BG mgmt    Clinically Significant Risk Factors                   # Acute heart failure with reduced ejection fraction: last echo with EF <40% and receiving IV diuretics             # Obesity: Estimated body mass index is 38.57 kg/m  as calculated from the following:    Height as of this encounter: 1.829 m (6').    Weight as of this encounter: 129 kg (284 lb 6.3 oz)., PRESENT ON ADMISSION             PT/OT: ordered  Diet: Combination Diet Thin Liquids (level 0) (No Straws); Soft and Bite Sized Diet (level 6); Thin Liquids (level 0) (No straws)    DVT Prophylaxis: Heparin  Ravi Catheter: Not present  Lines: None     Cardiac Monitoring: ACTIVE order. Indication: Stroke, acute (48 hours)  Code Status: Full Code    Medically Ready for Discharge: Anticipated in 2-4 Days      Luciano Gallagher MD  Hospitalist Service  [facility]  Securely message with [employer] (more info)  Text page via [employer] Paging/Directory     - Total time spent on encounter is 55 minutes, which includes counseling, coordination of care, time spent discussing with family, and/or time spent discussing with consultants.    Data reviewed today: I reviewed all new labs and imaging results over the last 24 hours.    Physical Exam   Temp: 98.2  F (36.8  C) Temp src: Oral BP: (!) 128/91 (BP was checkd 2x, notified  RN) Pulse: 79   Resp: 18 SpO2: 97 % O2 Device: None (Room air)    Vitals:    06/12/24 1406 06/12/24 1700 06/13/24 0700   Weight: 127 kg (279 lb 15.8 oz) 126 kg (277 lb 12.5 oz) 129 kg (284 lb 6.3 oz)     Vital Signs with Ranges  Temp:  [98  F (36.7  C)-99.5  F (37.5  C)] 98.2  F (36.8  C)  Pulse:  [75-94] 79  Resp:  [18] 18  BP: (123-148)/() 128/91  SpO2:  [93 %-97 %] 97 %  I/O last 3 completed shifts:  In: 200 [P.O.:200]  Out: -   O2 requirements: none    Constitutional: Obese male in NAD  HEENT: Eyes nonicteric, oral mucosa moist  Cardiovascular: RRR, normal S1/2, no m/r/g  Respiratory: Basilar crackles  Vascular: 2-3+ BLE pitting edema  GI: Normoactive bowel sounds, nontender  Skin/Integumen: No rashes  Neuro/Psych: Appropriate affect and mood. A&Ox3, expressive aphasia, moves all extremities    Medications   Current Facility-Administered Medications   Medication Dose Route Frequency Provider Last Rate Last Admin    heparin 25,000 units in 0.45% NaCl 250 mL ANTICOAGULANT infusion  0-5,000 Units/hr Intravenous Continuous Louisa Townsend, DO 16.5 mL/hr at 06/15/24 0651 1,650 Units/hr at 06/15/24 0651    medication instruction - No oral meds if patient didn't pass dysphagia screen   Does not apply Continuous PRN Cameron Frederick PA-C        Medication Instructions - Avoid dextrose in IV solutions.   Intravenous Continuous PRN Cameron Frederick PA-C         Current Facility-Administered Medications   Medication Dose Route Frequency Provider Last Rate Last Admin    atorvastatin (LIPITOR) tablet 40 mg  40 mg Oral QPM Radha Haney PA-C   40 mg at 06/14/24 2009    carvedilol (COREG) tablet 3.125 mg  3.125 mg Oral BID w/meals Louisa Townsend, DO   3.125 mg at 06/15/24 0833    furosemide (LASIX) injection 40 mg  40 mg Intravenous Daily Ramiro Sung MD   40 mg at 06/15/24 0834    insulin aspart (NovoLOG) injection (RAPID ACTING)  1-7 Units Subcutaneous TID AC Louisa Townsend  DO Preethi        insulin aspart (NovoLOG) injection (RAPID ACTING)  1-5 Units Subcutaneous At Bedtime Louisa Townsend DO        lisinopril (ZESTRIL) tablet 5 mg  5 mg Oral Daily Louisa Townsend DO   5 mg at 06/15/24 0833    sodium chloride (PF) 0.9% PF flush 3 mL  3 mL Intracatheter Q8H Cameron Frederick PA-C   3 mL at 06/14/24 0954       Data   Recent Labs   Lab 06/15/24  0806 06/15/24  0612 06/15/24  0126 06/14/24  1231 06/14/24  1103 06/13/24  0752 06/13/24  0742 06/12/24  1814 06/12/24  1408   WBC  --  8.6  --   --  10.6  --  9.8  --  11.4*   HGB  --  14.2  --   --  16.1  --  16.1  --  17.3   MCV  --  96  --   --  95  --  95  --  96   PLT  --  281  --   --  334  --  349  --  377   INR  --   --   --   --   --   --   --   --  1.14   NA  --  139  --   --  138  --  140  --  144   POTASSIUM  --  3.4  --   --  4.1  --  3.9  --  3.8   CHLORIDE  --  102  --   --  102  --  104  --  104   CO2  --  24  --   --  21*  --  21*  --  23   BUN  --  30.1*  --   --  30.0*  --  22.2  --  21.2   CR  --  1.07  --   --  1.02  --  0.85  --  0.96   ANIONGAP  --  13  --   --  15  --  15  --  17*   PARISH  --  8.9  --   --  9.6  --  9.7  --  9.9   * 108* 123*   < > 121*   < > 136*   < > 171*    < > = values in this interval not displayed.       Imaging:   Recent Results (from the past 24 hour(s))   US Carotid Bilateral    Narrative    BILATERAL CAROTID ULTRASOUND   6/14/2024 10:57 AM     HISTORY:  Left ICA plaque, stroke.    COMPARISON: CTA 6/12/2024    RIGHT CAROTID FINDINGS:  Plaque in the right common carotid, carotid  bulb and internal carotid artery.  Right ICA PSV:  95 cm/sec.  Right ICA EDV:  33 cm/sec.  Right ICA/CCA PSV Ratio:  1.55    These indicate less than 50% diameter stenosis of the right ICA.    Right Vertebral: Antegrade flow.   Right ECA: Antegrade flow.     LEFT CAROTID FINDINGS:  Plaque in the carotid bulb and internal  carotid artery.  Left ICA PSV:  60 cm/sec.  Left ICA EDV:  24  cm/sec.  Left ICA/CCA PSV Ratio:  1.06    These indicate less than 50% diameter stenosis of the left ICA.    Left Vertebral: Occluded, unchanged.  Left ECA: Antegrade flow.     Causes of Decreased Accuracy:   None.       Impression    IMPRESSION:    1. Less than 50% diameter stenosis of the right ICA relative to the  distal ICA diameter.   2. Less than 50% diameter stenosis of the left ICA relative to the  distal ICA diameter.   3. Occlusion of the left vertebral artery, unchanged when compared to  the prior CT from 6/12/2024.    CECELIA MCCLENDON DO         SYSTEM ID:  [id number]

## 2024-06-16 ENCOUNTER — APPOINTMENT (OUTPATIENT)
Dept: OCCUPATIONAL THERAPY | Facility: CLINIC | Age: 64
DRG: 064 | End: 2024-06-16
Attending: PSYCHIATRY & NEUROLOGY
Payer: COMMERCIAL

## 2024-06-16 LAB
ANION GAP SERPL CALCULATED.3IONS-SCNC: 13 MMOL/L (ref 7–15)
BUN SERPL-MCNC: 26.4 MG/DL (ref 8–23)
CALCIUM SERPL-MCNC: 9.2 MG/DL (ref 8.8–10.2)
CHLORIDE SERPL-SCNC: 102 MMOL/L (ref 98–107)
CREAT SERPL-MCNC: 1.15 MG/DL (ref 0.67–1.17)
DEPRECATED HCO3 PLAS-SCNC: 25 MMOL/L (ref 22–29)
EGFRCR SERPLBLD CKD-EPI 2021: 71 ML/MIN/1.73M2
ERYTHROCYTE [DISTWIDTH] IN BLOOD BY AUTOMATED COUNT: 13 % (ref 10–15)
GLUCOSE BLDC GLUCOMTR-MCNC: 106 MG/DL (ref 70–99)
GLUCOSE BLDC GLUCOMTR-MCNC: 107 MG/DL (ref 70–99)
GLUCOSE BLDC GLUCOMTR-MCNC: 108 MG/DL (ref 70–99)
GLUCOSE BLDC GLUCOMTR-MCNC: 142 MG/DL (ref 70–99)
GLUCOSE SERPL-MCNC: 144 MG/DL (ref 70–99)
HCT VFR BLD AUTO: 47.2 % (ref 40–53)
HGB BLD-MCNC: 15.3 G/DL (ref 13.3–17.7)
INR PPP: 1.14 (ref 0.85–1.15)
MCH RBC QN AUTO: 31.3 PG (ref 26.5–33)
MCHC RBC AUTO-ENTMCNC: 32.4 G/DL (ref 31.5–36.5)
MCV RBC AUTO: 97 FL (ref 78–100)
NT-PROBNP SERPL-MCNC: 3229 PG/ML (ref 0–900)
PLATELET # BLD AUTO: 310 10E3/UL (ref 150–450)
POTASSIUM SERPL-SCNC: 3.3 MMOL/L (ref 3.4–5.3)
POTASSIUM SERPL-SCNC: 3.6 MMOL/L (ref 3.4–5.3)
RBC # BLD AUTO: 4.89 10E6/UL (ref 4.4–5.9)
SODIUM SERPL-SCNC: 140 MMOL/L (ref 135–145)
WBC # BLD AUTO: 8.2 10E3/UL (ref 4–11)

## 2024-06-16 PROCEDURE — 99233 SBSQ HOSP IP/OBS HIGH 50: CPT | Performed by: NURSE PRACTITIONER

## 2024-06-16 PROCEDURE — 250N000011 HC RX IP 250 OP 636: Mod: JZ | Performed by: NURSE PRACTITIONER

## 2024-06-16 PROCEDURE — 80048 BASIC METABOLIC PNL TOTAL CA: CPT | Performed by: NURSE PRACTITIONER

## 2024-06-16 PROCEDURE — 99231 SBSQ HOSP IP/OBS SF/LOW 25: CPT | Performed by: NURSE PRACTITIONER

## 2024-06-16 PROCEDURE — 250N000013 HC RX MED GY IP 250 OP 250 PS 637: Performed by: HOSPITALIST

## 2024-06-16 PROCEDURE — 120N000001 HC R&B MED SURG/OB

## 2024-06-16 PROCEDURE — 83880 ASSAY OF NATRIURETIC PEPTIDE: CPT | Performed by: INTERNAL MEDICINE

## 2024-06-16 PROCEDURE — 97535 SELF CARE MNGMENT TRAINING: CPT | Mod: GO

## 2024-06-16 PROCEDURE — 84132 ASSAY OF SERUM POTASSIUM: CPT | Performed by: INTERNAL MEDICINE

## 2024-06-16 PROCEDURE — 250N000013 HC RX MED GY IP 250 OP 250 PS 637: Performed by: INTERNAL MEDICINE

## 2024-06-16 PROCEDURE — 250N000013 HC RX MED GY IP 250 OP 250 PS 637: Performed by: NURSE PRACTITIONER

## 2024-06-16 PROCEDURE — 250N000011 HC RX IP 250 OP 636: Performed by: INTERNAL MEDICINE

## 2024-06-16 PROCEDURE — 250N000013 HC RX MED GY IP 250 OP 250 PS 637: Performed by: PHYSICIAN ASSISTANT

## 2024-06-16 PROCEDURE — 99232 SBSQ HOSP IP/OBS MODERATE 35: CPT | Performed by: INTERNAL MEDICINE

## 2024-06-16 PROCEDURE — 85610 PROTHROMBIN TIME: CPT | Performed by: INTERNAL MEDICINE

## 2024-06-16 PROCEDURE — 250N000011 HC RX IP 250 OP 636: Mod: JZ | Performed by: INTERNAL MEDICINE

## 2024-06-16 PROCEDURE — 36415 COLL VENOUS BLD VENIPUNCTURE: CPT | Performed by: INTERNAL MEDICINE

## 2024-06-16 PROCEDURE — 85014 HEMATOCRIT: CPT | Performed by: INTERNAL MEDICINE

## 2024-06-16 PROCEDURE — 36415 COLL VENOUS BLD VENIPUNCTURE: CPT | Performed by: NURSE PRACTITIONER

## 2024-06-16 RX ORDER — FUROSEMIDE 10 MG/ML
60 INJECTION INTRAMUSCULAR; INTRAVENOUS DAILY
Status: DISCONTINUED | OUTPATIENT
Start: 2024-06-16 | End: 2024-06-16

## 2024-06-16 RX ORDER — WARFARIN SODIUM 5 MG/1
5 TABLET ORAL
Status: COMPLETED | OUTPATIENT
Start: 2024-06-16 | End: 2024-06-16

## 2024-06-16 RX ORDER — FUROSEMIDE 10 MG/ML
20 INJECTION INTRAMUSCULAR; INTRAVENOUS ONCE
Status: COMPLETED | OUTPATIENT
Start: 2024-06-16 | End: 2024-06-16

## 2024-06-16 RX ORDER — FUROSEMIDE 10 MG/ML
60 INJECTION INTRAMUSCULAR; INTRAVENOUS DAILY
Status: DISCONTINUED | OUTPATIENT
Start: 2024-06-17 | End: 2024-06-17

## 2024-06-16 RX ORDER — POTASSIUM CHLORIDE 1500 MG/1
40 TABLET, EXTENDED RELEASE ORAL ONCE
Status: COMPLETED | OUTPATIENT
Start: 2024-06-16 | End: 2024-06-16

## 2024-06-16 RX ADMIN — ENOXAPARIN SODIUM 135 MG: 150 INJECTION SUBCUTANEOUS at 19:49

## 2024-06-16 RX ADMIN — WARFARIN SODIUM 5 MG: 5 TABLET ORAL at 17:33

## 2024-06-16 RX ADMIN — POTASSIUM CHLORIDE 40 MEQ: 1500 TABLET, EXTENDED RELEASE ORAL at 17:32

## 2024-06-16 RX ADMIN — ATORVASTATIN CALCIUM 40 MG: 40 TABLET, FILM COATED ORAL at 19:49

## 2024-06-16 RX ADMIN — FUROSEMIDE 20 MG: 10 INJECTION, SOLUTION INTRAMUSCULAR; INTRAVENOUS at 15:36

## 2024-06-16 RX ADMIN — ENOXAPARIN SODIUM 135 MG: 150 INJECTION SUBCUTANEOUS at 08:14

## 2024-06-16 RX ADMIN — CARVEDILOL 6.25 MG: 6.25 TABLET, FILM COATED ORAL at 08:14

## 2024-06-16 RX ADMIN — FUROSEMIDE 40 MG: 10 INJECTION, SOLUTION INTRAMUSCULAR; INTRAVENOUS at 08:17

## 2024-06-16 RX ADMIN — CARVEDILOL 6.25 MG: 6.25 TABLET, FILM COATED ORAL at 17:33

## 2024-06-16 RX ADMIN — EMPAGLIFLOZIN 10 MG: 10 TABLET, FILM COATED ORAL at 08:14

## 2024-06-16 ASSESSMENT — ACTIVITIES OF DAILY LIVING (ADL)
ADLS_ACUITY_SCORE: 27
ADLS_ACUITY_SCORE: 29
ADLS_ACUITY_SCORE: 29
ADLS_ACUITY_SCORE: 27
ADLS_ACUITY_SCORE: 29
ADLS_ACUITY_SCORE: 29
ADLS_ACUITY_SCORE: 27
ADLS_ACUITY_SCORE: 29
ADLS_ACUITY_SCORE: 27
ADLS_ACUITY_SCORE: 29
ADLS_ACUITY_SCORE: 27
ADLS_ACUITY_SCORE: 29
ADLS_ACUITY_SCORE: 27
ADLS_ACUITY_SCORE: 29
ADLS_ACUITY_SCORE: 29
ADLS_ACUITY_SCORE: 27
ADLS_ACUITY_SCORE: 29
ADLS_ACUITY_SCORE: 29
ADLS_ACUITY_SCORE: 27

## 2024-06-16 NOTE — PLAN OF CARE
"Patient here with left MCA CVA. A&Ox4, when provided yes/no options. Able to state last name, \"hospital\", and birthday. Appears to understand everything that's asked of him and able to follow all commands. Expressive aphasia/moderate WFD. Slow/deliberate with finger/nose. Other neuros intact. VSS. Tele NSR. Elevated BG at dinner; patient provided education on effect of blood glucose with strokes; consideration of limiting juice intake. Up with SBA. Cardiology ordered daily weights/strict I&Os. Tolerating soft/bite sized diet/2G Na restriction with thin liquids. Therapies recommending TCU vs. ARU. Ziopatches ordered for discharge.   "

## 2024-06-16 NOTE — PLAN OF CARE
Neuro: Word finding difficulty, oriented x3 when offered choices to pick from. Otherwise neuro intact  Tele/Cardiac: SR  Resp: WDL  Activity: SBA gb  Pain: denies  Drips/IV: SL  GI/: WDL  Skin: WDL  Diet: Combination Diet Thin Liquids (level 0) (No Straws); Soft and Bite Sized Diet (level 6); Thin Liquids (level 0) (No straws)  Room Service     Test/Procedures: n/a  Plan: bridging to coumadin, TCU recommended at discharge

## 2024-06-16 NOTE — PROGRESS NOTES
"[facility]    Hospitalist Progress Note    Interval History   - Aphasia not significantly changed. Good appetite, feeling well. Based on his prior level of function and current function I think he would be a good acute rehab candidate. Possibly stable to discharge to ARU/TCU pending Cardiology consult    Assessment & Plan   Summary: Cirilo Gardner is a 63 year old male with no significant PMH who was admitted on 6/12/2024 with acute posterior left MCA infarction with symptoms of mixed expressive/receptive aphasia and right visual field cut. Found to have systolic heart failure and apical thombus likely etiology of stroke.    Acute ischemic stroke of posterior left MCA presumed 2/2 LV thrombus  Petechial hemorrhage on MRI  Mixed expressive/receptive aphasia  Right visual field cut  Brought to ED with confusion, patient reported was \"definitely not right\" at 1000, client called 911 at 1400. In the ED, was afebrile, tachycardic and hypertensive with SBPs 170s. Was noted to have expressive/receptive deficits and right visual field cut. Code stroke was called. Labs notable for WBC 11.4, lytes/Cr stable, ,  proBNP 12k, trops elevated (57 -- 83). EKS showed ST with PVCs, possible LAE; no ST/T changes. Head CT neg. CTA head/neck showed an age-indeterminate lack of flow in the left vertebral artery of uncertain etiology and mild atherosclerotic narrowing  at the R ICA origin but was otherwise nl. CT perfusion study showed delayed contrast arrival in the area of the posterolateral aspect of the left cerebral hemisphere involving portions of the left temporal and left occipital lobes; findings suggestive of infarct.   A1c 6.4, FLP with tot cholest 135, HDL 41, LDL 82.   Seen by stroke team. Felt to be out of the window for thrombolytics. Given ASA and Plavix load. Echo obtained and showed findings of an apical LV thrombus.  Findings were discussed with stroke service.  Aspirin and Plavix " were stopped and he was placed on a low intensity heparin drip on 6/13 PM.  MRI brain 6/14 showed a moderate-sized evolving infarct in the temporal/occipital lobes with petechial hemorrhage and a similar zone of evolving infarct in the parasagittal left frontal lobe.  Stroke neurology service was made aware of these findings given recent initiation of anticoagulation.  Was continued on heparin drip.   Bedside sitter ordered on 6/14 AM after patient attempted to leave AMA x2. Less confused, less aphasic on 6/15, stop bedside sitter. Patient transitioned to warfarin + Lovenox.  - Appreciate Neurology consultation  - Lovenox + warfarin  - Cont atorvastatin 40mg daily  - Tighten BP control, goal -160  - PT/OT, on modified diet per SLP    Acute LV thrombus  Newly diagnosed systolic CHF, chronicity unclear  NSTEMI, suspect type II dt demand ischemia in setting of CVA  Pulmonary hypertension  No known hx of CAD, CHF. On admission this stay, noted to have bilateral LE edema, patient mentioned had been there for several days though unclear in regards to the specifics. Trop trend as above. ProBNP significant elevated.  EKG nonischemic. No reports of chest pain.  *Echo obtained this stay and showed EF 15-20% with severe global hypokinesia of the LV and an apical LV thrombus. LV apex clot 1.5x1.9cm, non-mobile. Also noted to have findings of biatrial enlargement, mod decreased RVSF and mod pulmonary hypertension.   - Appreciate Cardiology consult   - ischemic workup pending   - IV diuresis  - Anticoagulation as above  - Increase Coreg 6.25mg BID    Prediabetes  A1c 6.4 this stay.   - using sliding scale insulin while hospitalized  - follow up with PCP after discharge for ongoing BG mgmt    Clinically Significant Risk Factors                   # Acute heart failure with reduced ejection fraction: last echo with EF <40% and receiving IV diuretics             # Obesity: Estimated body mass index is 38.57 kg/m  as calculated  from the following:    Height as of this encounter: 1.829 m (6').    Weight as of this encounter: 129 kg (284 lb 6.3 oz)., PRESENT ON ADMISSION             PT/OT: ordered  Diet: Combination Diet Thin Liquids (level 0) (No Straws); Soft and Bite Sized Diet (level 6); Thin Liquids (level 0) (No straws)  Room Service    DVT Prophylaxis: Lovenox + warfarin  Ravi Catheter: Not present  Lines: None     Cardiac Monitoring: ACTIVE order. Indication: Stroke, acute (48 hours)  Code Status: Full Code    Medically Ready for Discharge: Anticipated in 2-4 Days      Luciano Gallagher MD  Hospitalist Service  [facility]  Securely message with [employer] (more info)  Text page via [employer] Paging/Directory       Data reviewed today: I reviewed all new labs and imaging results over the last 24 hours.    Physical Exam   Temp: 97.3  F (36.3  C) Temp src: Oral BP: (!) 153/103 Pulse: 72   Resp: 18 SpO2: 98 % O2 Device: None (Room air)    Vitals:    06/12/24 1406 06/12/24 1700 06/13/24 0700   Weight: 127 kg (279 lb 15.8 oz) 126 kg (277 lb 12.5 oz) 129 kg (284 lb 6.3 oz)     Vital Signs with Ranges  Temp:  [97.3  F (36.3  C)-98.5  F (36.9  C)] 97.3  F (36.3  C)  Pulse:  [71-84] 72  Resp:  [18-20] 18  BP: (124-153)/() 153/103  SpO2:  [91 %-98 %] 98 %  I/O last 3 completed shifts:  In: 240 [P.O.:240]  Out: -   O2 requirements: none    Constitutional: Obese male in NAD  HEENT: Eyes nonicteric, oral mucosa moist  Cardiovascular: RRR, normal S1/2, no m/r/g  Respiratory: Basilar crackles  Vascular: 2-3+ BLE pitting edema  GI: Normoactive bowel sounds, nontender  Skin/Integumen: No rashes  Neuro/Psych: Appropriate affect and mood. A&Ox3, expressive aphasia, moves all extremities    Medications   Current Facility-Administered Medications   Medication Dose Route Frequency Provider Last Rate Last Admin    medication instruction - No oral meds if patient didn't pass dysphagia screen   Does not apply Continuous PRN  Cameron Frederick PA-C        Medication Instructions - Avoid dextrose in IV solutions.   Intravenous Continuous PRN Cameron Frederick PA-C         Current Facility-Administered Medications   Medication Dose Route Frequency Provider Last Rate Last Admin    atorvastatin (LIPITOR) tablet 40 mg  40 mg Oral QPM Radha Haney PA-C   40 mg at 06/15/24 2014    carvedilol (COREG) tablet 6.25 mg  6.25 mg Oral BID w/meals Margarita Marvin CNP   6.25 mg at 06/16/24 0814    empagliflozin (JARDIANCE) tablet 10 mg  10 mg Oral Daily Margarita Marvin CNP   10 mg at 06/16/24 0814    enoxaparin ANTICOAGULANT (LOVENOX) injection 135 mg  1 mg/kg Subcutaneous Q12H Luciano Gallagher MD   135 mg at 06/16/24 0814    insulin aspart (NovoLOG) injection (RAPID ACTING)  1-7 Units Subcutaneous TID AC Louisa Townsend DO        insulin aspart (NovoLOG) injection (RAPID ACTING)  1-5 Units Subcutaneous At Bedtime Louisa Townsend DO        sodium chloride (PF) 0.9% PF flush 3 mL  3 mL Intracatheter Q8H Cameron Frederick PA-C   3 mL at 06/14/24 0954    Warfarin Dose Required Daily - Pharmacist Managed  1 each Oral See Admin Instructions Luciano Gallagher MD           Data   Recent Labs   Lab 06/16/24  0757 06/16/24  0731 06/15/24  2121 06/15/24  1726 06/15/24  1643 06/15/24  1512 06/15/24  0806 06/15/24  0612 06/14/24  1231 06/14/24  1103 06/13/24  0752 06/13/24  0742   WBC  --  8.2  --   --   --   --   --  8.6  --  10.6  --  9.8   HGB  --  15.3  --   --   --   --   --  14.2  --  16.1  --  16.1   MCV  --  97  --   --   --   --   --  96  --  95  --  95   PLT  --  310  --   --   --   --   --  281  --  334  --  349   INR  --  1.14  --  1.15  --  1.20*  --   --   --   --   --   --    NA  --   --   --   --   --   --   --  139  --  138  --  140   POTASSIUM  --   --   --   --   --   --   --  3.4  --  4.1  --  3.9   CHLORIDE  --   --   --   --   --   --   --  102  --  102  --  104   CO2  --   --   --    --   --   --   --  24  --  21*  --  21*   BUN  --   --   --   --   --   --   --  30.1*  --  30.0*  --  22.2   CR  --   --   --   --   --   --   --  1.07  --  1.02  --  0.85   ANIONGAP  --   --   --   --   --   --   --  13  --  15  --  15   PARISH  --   --   --   --   --   --   --  8.9  --  9.6  --  9.7   *  --  129*  --  139*  --    < > 108*   < > 121*   < > 136*    < > = values in this interval not displayed.       Imaging:   No results found for this or any previous visit (from the past 24 hour(s)).

## 2024-06-16 NOTE — PROGRESS NOTES
[facility]    Stroke Progress Note    Interval Events  Heparin stopped and on coumadin with lovenox bridging. Exam stable today.     HPI Summary  Cirilo Gardner is a 63 year old male who has not seen a doctor in many years, not on any medication PTA. He was brought to the ED 6/12 after he was on the phone with someone who noted him to be confused and notified EMS. In the ED he was noted to have mixed expressive/receptive language deficits and R visual field cut. On further questioning of the patient he was able to provide history that he woke up around 0300 feeling ok, around 0800 he felt that something wasn't right and by 1000 something was definitely not right. TNK not given due to presentation outside the conventional treatment window. No thrombectomy given absence of proximal vessel occlusion. MRI with L temporal/occipital and frontal infarcts. LVEF 15-20% with cardiac thrombus.     Stroke Evaluation Summarized     MRI/Head CT CT 6/12: No acute intercranial hemorrhage  CT 6/14: evolving L temporal/occipital infarct with small areas of petechial hemorrhage, small amount SAH in L temporal/occipital region, mild mass effect on posterior L lateral ventricle without hydrocephalus or entrapment      MRI: subacute infarct of L temporal/occipital and L parasagittal frontal regions, petechial hemorrhage of temporal/occipital infarct (only DWI and sagittal T1 sequences completed)    Intracranial Vasculature CTA: reduced arborization inferior L MCA M3, no clear LVO   Cervical Vasculature CTA: Lack flow L vertebral artery, age indeterminate,  R ICA short segment <50% narrowing, Soft plaque L ICA with mild stenosis      Carotid US:  1. Less than 50% diameter stenosis of the right ICA relative to the  distal ICA diameter.   2. Less than 50% diameter stenosis of the left ICA relative to the  distal ICA diameter.   3. Occlusion of the left vertebral artery, unchanged when compared to  the  prior CT from 6/12/2024.             Echocardiogram LVEF 15-20%, mild to moderate concentric LVH, severe global hypokinesia  of LV, apical L ventricular thrombus 1.5x1.9cm, moderate biatrial enlargement    EKG/Telemetry Sinus Tach with PVCs   Other Testing Perfusion: decreased perfusion L temporal and L occipital lobe dec blood flow, Dec volume      LDL  LDL  82   A1C  6/12/2024: 6.4 %   Troponin 6/12/2024: 83 ng/L     Impression   L frontal, temporal and occipital infarcts due to cardioembolism   Apical thrombus, LVEF 15-20%, severe global hypokinesia   L ICA soft plaque -not felt to be symptomatic   L vert occlusion, unknown chronicity  Mild mass effect     Plan  - Neurochecks and Vital Signs every 4hrs  - Inpatient BP goal 120-160   - Continue coumadin (+lovenox bridge until therapeutic); duration of anticoagulation will depend on follow up cardiac imaging--if thrombus resolves and no other indication for long term anticoagulation will likely transition to antiplatelet therapy with ASA  - Statin: atorvastatin 40mg PO daily, LDL goal 40-70  - Telemetry  - Bedside Glucose Monitoring  - A1c 6.4% is consistent with diagnosis of pre-diabetes - management/education per primary team. Goal <7%  - PT/OT/SLP consulted. Should not drive due to visual field cut.  - Stroke Education  - Euthermia, Euglycemia, Eunatremic  - Set up for checking blood pressures at home daily   - cardiology following     Patient Follow-up    - in 6-8 weeks with general neurology or stroke ANDREW ([phone]), ordered   -14 day cardiac event monitor (Zio Patch) to be mailed to patient, ordered   - repeat TTE in 3 months to reassess presence of thrombus, LVEF and guide anticoagulation duration  -sleep referral for evaluation of possible YENNIFER, ordered     No further stroke evaluation is recommended, so we will sign off. Please contact us with any additional questions.      DEBRA Lyon CNP  Vascular Neurology    To page me or covering stroke  "neurology team member, click here: AMCOM  Choose \"On Call\" tab at top, then select \"NEUROLOGY/ALL SITES\" from middle drop-down box, press Enter, then look for \"stroke\" or \"telestroke\" for your site.  ______________________________________________________    Clinically Significant Risk Factors                   # Acute heart failure with reduced ejection fraction: last echo with EF <40% and receiving IV diuretics             # Obesity: Estimated body mass index is 38.57 kg/m  as calculated from the following:    Height as of this encounter: 1.829 m (6').    Weight as of this encounter: 129 kg (284 lb 6.3 oz)., PRESENT ON ADMISSION              Medications   Scheduled Meds  Current Facility-Administered Medications   Medication Dose Route Frequency Provider Last Rate Last Admin    atorvastatin (LIPITOR) tablet 40 mg  40 mg Oral QPM Radha Haney PA-C   40 mg at 06/15/24 2014    carvedilol (COREG) tablet 6.25 mg  6.25 mg Oral BID w/meals Margarita Marvin, CNP   6.25 mg at 06/15/24 1756    empagliflozin (JARDIANCE) tablet 10 mg  10 mg Oral Daily Margarita Marvin CNP   10 mg at 06/15/24 1248    enoxaparin ANTICOAGULANT (LOVENOX) injection 135 mg  1 mg/kg Subcutaneous Q12H Luciano Gallagher MD   135 mg at 06/15/24 2014    furosemide (LASIX) injection 40 mg  40 mg Intravenous Daily Ramiro Sung MD   40 mg at 06/15/24 0834    insulin aspart (NovoLOG) injection (RAPID ACTING)  1-7 Units Subcutaneous TID AC Louisa Townsend DO        insulin aspart (NovoLOG) injection (RAPID ACTING)  1-5 Units Subcutaneous At Bedtime Louisa Townsend DO        sodium chloride (PF) 0.9% PF flush 3 mL  3 mL Intracatheter Q8H Cameron Frederick PA-C   3 mL at 06/14/24 0954    Warfarin Dose Required Daily - Pharmacist Managed  1 each Oral See Admin Instructions Luciano Gallagher MD           Infusion Meds  Current Facility-Administered Medications   Medication Dose Route Frequency Provider Last Rate Last " Admin    medication instruction - No oral meds if patient didn't pass dysphagia screen   Does not apply Continuous PRN Cameron Frederick PA-C        Medication Instructions - Avoid dextrose in IV solutions.   Intravenous Continuous PRN Cameron Frederick PA-C           PRN Meds  Current Facility-Administered Medications   Medication Dose Route Frequency Provider Last Rate Last Admin    acetaminophen (TYLENOL) Suppository 650 mg  650 mg Rectal Q4H PRN Cameron Frederick PA-C        glucose gel 15-30 g  15-30 g Oral Q15 Min PRN Cameron Frederick PA-C        Or    dextrose 50 % injection 25-50 mL  25-50 mL Intravenous Q15 Min PRN Cameron Frederick PA-C        Or    glucagon injection 1 mg  1 mg Subcutaneous Q15 Min PRN Cameron Frederick PA-C        labetalol (NORMODYNE/TRANDATE) injection 10-20 mg  10-20 mg Intravenous Q10 Min PRN Cameron Frederick PA-C        Or    hydrALAZINE (APRESOLINE) injection 10-20 mg  10-20 mg Intravenous Q1H PRN Cameron Frederick PA-C        ipratropium - albuterol 0.5 mg/2.5 mg/3 mL (DUONEB) neb solution 3 mL  3 mL Nebulization Q4H PRN Luciano Gallagher MD        lidocaine (LMX4) cream   Topical Q1H PRN Cameron Frederick PA-C        lidocaine 1 % 0.1-1 mL  0.1-1 mL Other Q1H PRN Cameron Frederick PA-C        medication instruction - No oral meds if patient didn't pass dysphagia screen   Does not apply Continuous PRN Cameron Frederick PA-C        Medication Instructions - Avoid dextrose in IV solutions.   Intravenous Continuous PRN Cameron Frederick PA-C        ondansetron (ZOFRAN ODT) ODT tab 4 mg  4 mg Oral Q6H PRN Cameron Frederick PA-C        Or    ondansetron (ZOFRAN) injection 4 mg  4 mg Intravenous Q6H PRN Cameron Frederick PA-C        sodium chloride (PF) 0.9% PF flush 3 mL  3 mL Intracatheter q1 min prn Cameron Frederick PA-C   3 mL at 06/13/24 0020           PHYSICAL EXAMINATION  Temp:  [97.3  F (36.3  C)-98.5  F (36.9  C)] 97.3  F (36.3  C)  Pulse:  [71-84] 72  Resp:  [18-20] 18  BP: (124-153)/() 153/103  SpO2:  [91 %-98 %] 98 %      General Exam  General:  patient sitting in chair without any acute distress    HEENT:  normocephalic/atraumatic  Pulmonary:  no respiratory distress        Neuro Exam  Mental Status:  alert, able to state he is in a hospital but not name of hospital, city, month or age; follow all commands including two step commands, moderate expressive aphasia, mild receptive aphasia   Cranial Nerves: no clear field cut, PER, EOMI with normal smooth pursuit, facial sensation intact and symmetric, facial movements symmetric, hearing not formally tested but intact to conversation, no dysarthria  Motor:  normal muscle tone and bulk, no abnormal movements, able to move all limbs spontaneously, strength 5/5 throughout upper and lower extremities, no pronator drift  Sensory:  light touch sensation intact and symmetric throughout upper and lower extremities, no extinction on double simultaneous stimulation   Coordination:  no ataxia identified  Station/Gait:  deferred    Stroke Scales    NIHSS  1a. Level of Consciousness 0-->Alert, keenly responsive   1b. LOC Questions 2-->Answers neither question correctly   1c. LOC Commands 0-->Performs both tasks correctly   2.   Best Gaze 0-->Normal   3.   Visual 0-->No visual loss   4.   Facial Palsy 0-->Normal symmetrical movements   5a. Motor Arm, Left 0-->No drift, limb holds 90 (or 45) degrees for full 10 secs   5b. Motor Arm, Right 0-->No drift, limb holds 90 (or 45) degrees for full 10 secs   6a. Motor Leg, Left 0-->No drift, leg holds 30 degree position for full 5 secs   6b. Motor Leg, right 0-->No drift, leg holds 30 degree position for full 5 secs   7.   Limb Ataxia 0-->Absent   8.   Sensory 0-->Normal, no sensory loss   9.   Best Language 1-->Mild-to-moderate aphasia, some obvious loss of fluency or  "facility of comprehension, without significant limitation on ideas expressed or form of expression. Reduction of speech and/or comprehension, however, makes conversation. . . (see row details)   10. Dysarthria 0-->Normal   11. Extinction and Inattention  0-->No abnormality   Total 3 (06/16/24 1027)         Imaging  I personally reviewed all imaging; relevant findings per HPI.     Lab Results Data   CBC  Recent Labs   Lab 06/16/24  0731 06/15/24  0612 06/14/24  1103   WBC 8.2 8.6 10.6   RBC 4.89 4.65 5.12   HGB 15.3 14.2 16.1   HCT 47.2 44.5 48.7    281 334     Basic Metabolic Panel    Recent Labs   Lab 06/15/24  2121 06/15/24  1643 06/15/24  1224 06/15/24  0806 06/15/24  0612 06/14/24  1231 06/14/24  1103 06/13/24  0752 06/13/24  0742   NA  --   --   --   --  139  --  138  --  140   POTASSIUM  --   --   --   --  3.4  --  4.1  --  3.9   CHLORIDE  --   --   --   --  102  --  102  --  104   CO2  --   --   --   --  24  --  21*  --  21*   BUN  --   --   --   --  30.1*  --  30.0*  --  22.2   CR  --   --   --   --  1.07  --  1.02  --  0.85   * 139* 104*   < > 108*   < > 121*   < > 136*   PARISH  --   --   --   --  8.9  --  9.6  --  9.7    < > = values in this interval not displayed.     Liver Panel  No results for input(s): \"PROTTOTAL\", \"ALBUMIN\", \"BILITOTAL\", \"ALKPHOS\", \"AST\", \"ALT\", \"BILIDIRECT\" in the last 168 hours.  INR    Recent Labs   Lab Test 06/15/24  1726 06/15/24  1512 06/12/24  1408   INR 1.15 1.20* 1.14      Lipid Profile    Recent Labs   Lab Test 06/13/24  0742   CHOL 135   HDL 41   LDL 82   TRIG 58     A1C    Recent Labs   Lab Test 06/12/24  1408   A1C 6.4*     Troponin    Recent Labs   Lab 06/12/24  2226 06/12/24 2015 06/12/24  1823   CTROPT 83* 83* 78*          Data     I have personally spent a total of 30 minutes providing care today, time spent in reviewing medical records and devising the plan as recorded above.  "

## 2024-06-16 NOTE — PROGRESS NOTES
Care Management Follow Up    Length of Stay (days): 4    Expected Discharge Date: 06/18/2024     Concerns to be Addressed: discharge planning  no  Patient plan of care discussed at interdisciplinary rounds: Yes    Anticipated Discharge Disposition: Transitional Care     Anticipated Discharge Services: None  Anticipated Discharge DME: None    Patient/family educated on Medicare website which has current facility and service quality ratings:    Education Provided on the Discharge Plan:    Patient/Family in Agreement with the Plan: yes    Referrals Placed by CM/SW:    Private pay costs discussed: Not applicable    Additional Information:  Writer spoke with MD regarding disposition for patient. MD requested writer make a referral to ARU as he felt patient would be a good ARU candidate. Writer made the referral.     Writer spoke with ARU liaison and they are not able to accept the patient as patient does not need assistive device and is CGA. Writer updated MD via [employer].    Writer spoke with patient and Pratik patient's brother regarding TCU. SNF list was given and patient and brother are going to look at the lists and talk to  tomorrow.     Prem Sanchez [facility]  Care Management

## 2024-06-16 NOTE — PROGRESS NOTES
[facility]    Cardiology Progress Note    Primary Cardiologist: Dr. Sung    Date of Admission: 6/12/2024  Service Date: 06/16/2024     Summary:  Mr. Cirilo Gadrner is a very pleasant 63 year old male with a past medical history of obesity and no other known medical history (has not seen a doctor in many years) who was admitted on 6/12/2024 after presenting with confusion and expressive/receptive aphasia along with a right visual cut and being found to have an acute left MCA stroke. Cardiology was consulted as the patient was found a new diagnosis of a cardiomyopathy with severe LV systolic dysfunction, ejection fraction 15-20% and LV apical thrombus on echocardiogram.    Interval History   Patient is resting comfortably. No acute events overnight. He denies symptoms of chest pain, palpitations, or shortness of breath at rest. He does not some dyspnea when he has gotten up to the bathroom. He continues to have significant lower extremity edema. States that he had some palpitations starting around 2-3 days prior to admission, but none since. He reports that his lower extremity edema started around 2-3 weeks prior to admission. He is a somewhat challenging historian and the patient's brother helps provide some history. The patient's brother feels that he is a bit clearer today in terms of his mentation. I am meeting the patient for the first time today.     Telemetry: Sinus rhythm     Assessment:  Severe LV systolic dysfunction (newly diagnosed), ejection fraction 15-20%  - Etiology: Unclear at this time--Ischemic vs. other  - Fluid status: Challenging to assess due to body habitus but appears volume overloaded with at least 1+ LE edema bilaterally  - Suspected dry weight: Unknown--Most likely closer to 270-275 lbs or possibly lower.   - Diuretic regimen: None PTA.   - Ischemic evaluation: Needs to be completed. Will try to pursue this once closer to euvolemic inpatient vs.  outpatient.    Guideline directed medical therapy (GDMT):  - Beta blocker: Carvedilol 6.25 mg BID  - ACEI/ARB/ARNI: lisinopril stopped 6/15 with plan to switch to low dose Entresto after 36 hr washout period.   - Aldactone antagonist: None currently.  - SGLT2 inhibitor: Jardiance 10 mg once daily.    LV apical thrombus measuring 1.5 x 1.9 cm  Acute left MCA stroke likely due to #2  Severe expressive/receptive aphasia  Confusion  Heart failure with reduced ejection fraction  Mildly elevated thrombus without delta, likely demand  Obesity    Plan:   1.  Continue guideline directed medical therapy for the newly diagnosed cardiomyopathy with carvedilol 6.25 mg twice daily and Jardiance 10 mg daily.    2.  Last dose of lisinopril was on 6/15. Plan for wash out to start low-dose Entresto 24-26 mg twice daily on 6/17 or 6/18 as long as this is okay with Neurology's blood pressure goals in the setting of his CVA. Note BP goal of 120-160.   3. Continue anticoagulation with warfarin given the LV apical thrombus.  4. Continue IV lasix. Will increase from 40 mg to 60 mg once daily. Please start checking daily weights and strict I/O to help with tracking volume status with diuresis. Weight not updated since 6/13 and output not documented. Continue with low sodium dietand close monitoring of renal function and electrolytes.  5.  Will need ischemic evaluation with coronary angiogram with RHC once he is closer to euvolemic and able to follow commands/consent, inpatient versus outpatient. More likely will complete this in follow-up as an outpatient since he is not having any clear anginal symptoms or concern for ACS and as he has now been started on anticoagulation with warfarin here.  6.  Would consider 7 to 14 day Zio patch monitor or cardiac event monitoring at discharge to rule out the possibility of underlying A.Fib. He has been in sinus rhythm thus far here on telemetry.   7. Cardiology will continue to follow along. Orders  placed for close follow-up in the [facility] within a few weeks post discharge and we will continue to work on optimization of GDMT for his cardiomyopathy.    55 total minutes was spent today including chart review, history and exam, care coordination, post visit documentation, patient education, and reviewing studies as outlined in this note.     Thank you for the opportunity to participate in this pleasant patient's care.     DEBRA Mckeon, CNP   Nurse Practitioner  [facility]  Pager: [phone]  Text Page or securely message via [employer] (8am - 5pm, M-F)    Patient Active Problem List   Diagnosis    Confusion    Elevated troponin    Visual field cut    Acute CVA (cerebrovascular accident) (H)       Physical Exam   Temp: 97.8  F (36.6  C) Temp src: Oral BP: 137/89 Pulse: 69   Resp: 18 SpO2: 94 % O2 Device: None (Room air)    Vitals:    06/12/24 1406 06/12/24 1700 06/13/24 0700   Weight: 127 kg (279 lb 15.8 oz) 126 kg (277 lb 12.5 oz) 129 kg (284 lb 6.3 oz)     Vital Signs with Ranges  Temp:  [97.3  F (36.3  C)-98.5  F (36.9  C)] 97.8  F (36.6  C)  Pulse:  [69-84] 69  Resp:  [18-20] 18  BP: (124-153)/() 137/89  SpO2:  [91 %-98 %] 94 %  I/O last 3 completed shifts:  In: 240 [P.O.:240]  Out: -     General: Appears his stated age, well nourished, and in no acute distress.  Eyes: No scleral icterus.  HEENT: Neck supple. Thick neck. Unable to visualize JVP due to body habitus.   Cardiovascular: Regular rate and rhythm, normal S1 and S2. No murmur, rub, or gallop.  Extremities: Moves all extremities well and symmetrically. There is at least 1+ lower extremity edema in the ankles to the knees bilaterally.  Respiratory: Breathing non-labored. Lungs clear to auscultation with no crackles or wheezes bilaterally.  Skin: No pallor or cyanosis.  Gastrointestinal: Non-distended.  Psych: Appropriate affect. Mentation normal.  Neurological: No gross focal deficits.    Medications   Current  Facility-Administered Medications   Medication Dose Route Frequency Provider Last Rate Last Admin    medication instruction - No oral meds if patient didn't pass dysphagia screen   Does not apply Continuous PRN Cameron Frederick PA-C        Medication Instructions - Avoid dextrose in IV solutions.   Intravenous Continuous PRN Cameron Frederick PA-C         Current Facility-Administered Medications   Medication Dose Route Frequency Provider Last Rate Last Admin    atorvastatin (LIPITOR) tablet 40 mg  40 mg Oral QPM Radha Haney PA-C   40 mg at 06/15/24 2014    carvedilol (COREG) tablet 6.25 mg  6.25 mg Oral BID w/meals Margarita Marvin CNP   6.25 mg at 06/16/24 0814    empagliflozin (JARDIANCE) tablet 10 mg  10 mg Oral Daily Margarita Marvin CNP   10 mg at 06/16/24 0814    enoxaparin ANTICOAGULANT (LOVENOX) injection 135 mg  1 mg/kg Subcutaneous Q12H Luciano Gallagher MD   135 mg at 06/16/24 0814    insulin aspart (NovoLOG) injection (RAPID ACTING)  1-7 Units Subcutaneous TID AC Louisa Townsend DO        insulin aspart (NovoLOG) injection (RAPID ACTING)  1-5 Units Subcutaneous At Bedtime Louisa Townsend DO        sodium chloride (PF) 0.9% PF flush 3 mL  3 mL Intracatheter Q8H Cameron Frederick PA-C   3 mL at 06/14/24 0954    Warfarin Dose Required Daily - Pharmacist Managed  1 each Oral See Admin Instructions Luciano Gallagher MD         Data   Recent Labs   Lab 06/16/24  0731 06/15/24  0612 06/14/24  1103   WBC 8.2 8.6 10.6   HGB 15.3 14.2 16.1   HCT 47.2 44.5 48.7   MCV 97 96 95    281 334     Recent Labs   Lab 06/16/24  1203 06/16/24  0757 06/15/24  2121 06/15/24  0806 06/15/24  0612 06/14/24  1231 06/14/24  1103 06/13/24  0752 06/13/24  0742   NA  --   --   --   --  139  --  138  --  140   POTASSIUM  --   --   --   --  3.4  --  4.1  --  3.9   CHLORIDE  --   --   --   --  102  --  102  --  104   CO2  --   --   --   --  24  --  21*  --  21*   ANIONGAP   --   --   --   --  13  --  15  --  15   * 106* 129*   < > 108*   < > 121*   < > 136*   BUN  --   --   --   --  30.1*  --  30.0*  --  22.2   CR  --   --   --   --  1.07  --  1.02  --  0.85   GFRESTIMATED  --   --   --   --  77  --  82  --  >90   PARISH  --   --   --   --  8.9  --  9.6  --  9.7    < > = values in this interval not displayed.        Please kindly note that this document was completed in part using Dragon voice recognition software. Although reviewed after completion, some word substitutions and typographical errors may occur. Please contact me if clarification is needed.

## 2024-06-17 ENCOUNTER — APPOINTMENT (OUTPATIENT)
Dept: CT IMAGING | Facility: CLINIC | Age: 64
DRG: 064 | End: 2024-06-17
Payer: COMMERCIAL

## 2024-06-17 ENCOUNTER — APPOINTMENT (OUTPATIENT)
Dept: SPEECH THERAPY | Facility: CLINIC | Age: 64
DRG: 064 | End: 2024-06-17
Attending: PSYCHIATRY & NEUROLOGY
Payer: COMMERCIAL

## 2024-06-17 ENCOUNTER — APPOINTMENT (OUTPATIENT)
Dept: PHYSICAL THERAPY | Facility: CLINIC | Age: 64
DRG: 064 | End: 2024-06-17
Attending: PSYCHIATRY & NEUROLOGY
Payer: COMMERCIAL

## 2024-06-17 ENCOUNTER — APPOINTMENT (OUTPATIENT)
Dept: MRI IMAGING | Facility: CLINIC | Age: 64
DRG: 064 | End: 2024-06-17
Attending: PSYCHIATRY & NEUROLOGY
Payer: COMMERCIAL

## 2024-06-17 ENCOUNTER — HOSPITAL ENCOUNTER (INPATIENT)
Facility: CLINIC | Age: 64
End: 2024-06-17
Payer: COMMERCIAL

## 2024-06-17 ENCOUNTER — APPOINTMENT (OUTPATIENT)
Dept: OCCUPATIONAL THERAPY | Facility: CLINIC | Age: 64
DRG: 064 | End: 2024-06-17
Attending: PSYCHIATRY & NEUROLOGY
Payer: COMMERCIAL

## 2024-06-17 LAB
ANION GAP SERPL CALCULATED.3IONS-SCNC: 12 MMOL/L (ref 7–15)
ANION GAP SERPL CALCULATED.3IONS-SCNC: 16 MMOL/L (ref 7–15)
APTT PPP: 36 SECONDS (ref 22–38)
BUN SERPL-MCNC: 24.3 MG/DL (ref 8–23)
BUN SERPL-MCNC: 24.4 MG/DL (ref 8–23)
CALCIUM SERPL-MCNC: 9.4 MG/DL (ref 8.8–10.2)
CALCIUM SERPL-MCNC: 9.5 MG/DL (ref 8.8–10.2)
CHLORIDE SERPL-SCNC: 100 MMOL/L (ref 98–107)
CHLORIDE SERPL-SCNC: 104 MMOL/L (ref 98–107)
CREAT SERPL-MCNC: 1.15 MG/DL (ref 0.67–1.17)
CREAT SERPL-MCNC: 1.18 MG/DL (ref 0.67–1.17)
DEPRECATED HCO3 PLAS-SCNC: 24 MMOL/L (ref 22–29)
DEPRECATED HCO3 PLAS-SCNC: 27 MMOL/L (ref 22–29)
EGFRCR SERPLBLD CKD-EPI 2021: 69 ML/MIN/1.73M2
EGFRCR SERPLBLD CKD-EPI 2021: 71 ML/MIN/1.73M2
ERYTHROCYTE [DISTWIDTH] IN BLOOD BY AUTOMATED COUNT: 13 % (ref 10–15)
GLUCOSE BLDC GLUCOMTR-MCNC: 101 MG/DL (ref 70–99)
GLUCOSE BLDC GLUCOMTR-MCNC: 115 MG/DL (ref 70–99)
GLUCOSE BLDC GLUCOMTR-MCNC: 119 MG/DL (ref 70–99)
GLUCOSE BLDC GLUCOMTR-MCNC: 120 MG/DL (ref 70–99)
GLUCOSE BLDC GLUCOMTR-MCNC: 152 MG/DL (ref 70–99)
GLUCOSE SERPL-MCNC: 121 MG/DL (ref 70–99)
GLUCOSE SERPL-MCNC: 126 MG/DL (ref 70–99)
HCT VFR BLD AUTO: 50.2 % (ref 40–53)
HGB BLD-MCNC: 17 G/DL (ref 13.3–17.7)
INR PPP: 1.19 (ref 0.85–1.15)
INR PPP: 1.2 (ref 0.85–1.15)
MCH RBC QN AUTO: 31.7 PG (ref 26.5–33)
MCHC RBC AUTO-ENTMCNC: 33.9 G/DL (ref 31.5–36.5)
MCV RBC AUTO: 94 FL (ref 78–100)
PLATELET # BLD AUTO: 362 10E3/UL (ref 150–450)
POTASSIUM SERPL-SCNC: 3.4 MMOL/L (ref 3.4–5.3)
POTASSIUM SERPL-SCNC: 3.5 MMOL/L (ref 3.4–5.3)
RBC # BLD AUTO: 5.37 10E6/UL (ref 4.4–5.9)
SODIUM SERPL-SCNC: 140 MMOL/L (ref 135–145)
SODIUM SERPL-SCNC: 143 MMOL/L (ref 135–145)
TROPONIN T SERPL HS-MCNC: 80 NG/L
WBC # BLD AUTO: 8.7 10E3/UL (ref 4–11)

## 2024-06-17 PROCEDURE — 250N000011 HC RX IP 250 OP 636: Mod: JZ | Performed by: INTERNAL MEDICINE

## 2024-06-17 PROCEDURE — 80048 BASIC METABOLIC PNL TOTAL CA: CPT

## 2024-06-17 PROCEDURE — 99231 SBSQ HOSP IP/OBS SF/LOW 25: CPT | Mod: 25 | Performed by: INTERNAL MEDICINE

## 2024-06-17 PROCEDURE — 85610 PROTHROMBIN TIME: CPT | Performed by: INTERNAL MEDICINE

## 2024-06-17 PROCEDURE — 80048 BASIC METABOLIC PNL TOTAL CA: CPT | Performed by: INTERNAL MEDICINE

## 2024-06-17 PROCEDURE — 92507 TX SP LANG VOICE COMM INDIV: CPT | Mod: GN | Performed by: SPEECH-LANGUAGE PATHOLOGIST

## 2024-06-17 PROCEDURE — 70553 MRI BRAIN STEM W/O & W/DYE: CPT

## 2024-06-17 PROCEDURE — 93005 ELECTROCARDIOGRAM TRACING: CPT

## 2024-06-17 PROCEDURE — 250N000011 HC RX IP 250 OP 636: Mod: JZ | Performed by: NURSE PRACTITIONER

## 2024-06-17 PROCEDURE — 250N000013 HC RX MED GY IP 250 OP 250 PS 637: Performed by: NURSE PRACTITIONER

## 2024-06-17 PROCEDURE — 99233 SBSQ HOSP IP/OBS HIGH 50: CPT | Mod: FS

## 2024-06-17 PROCEDURE — 93010 ELECTROCARDIOGRAM REPORT: CPT | Performed by: INTERNAL MEDICINE

## 2024-06-17 PROCEDURE — 85027 COMPLETE CBC AUTOMATED: CPT

## 2024-06-17 PROCEDURE — 36415 COLL VENOUS BLD VENIPUNCTURE: CPT

## 2024-06-17 PROCEDURE — 250N000011 HC RX IP 250 OP 636: Mod: JZ | Performed by: STUDENT IN AN ORGANIZED HEALTH CARE EDUCATION/TRAINING PROGRAM

## 2024-06-17 PROCEDURE — 70496 CT ANGIOGRAPHY HEAD: CPT

## 2024-06-17 PROCEDURE — 84484 ASSAY OF TROPONIN QUANT: CPT

## 2024-06-17 PROCEDURE — 92526 ORAL FUNCTION THERAPY: CPT | Mod: GN | Performed by: SPEECH-LANGUAGE PATHOLOGIST

## 2024-06-17 PROCEDURE — 250N000011 HC RX IP 250 OP 636

## 2024-06-17 PROCEDURE — 250N000009 HC RX 250

## 2024-06-17 PROCEDURE — 36415 COLL VENOUS BLD VENIPUNCTURE: CPT | Performed by: INTERNAL MEDICINE

## 2024-06-17 PROCEDURE — 258N000003 HC RX IP 258 OP 636: Mod: JZ | Performed by: STUDENT IN AN ORGANIZED HEALTH CARE EDUCATION/TRAINING PROGRAM

## 2024-06-17 PROCEDURE — 999N000157 HC STATISTIC RCP TIME EA 10 MIN

## 2024-06-17 PROCEDURE — 0042T CT HEAD PERFUSION W CONTRAST: CPT

## 2024-06-17 PROCEDURE — 99291 CRITICAL CARE FIRST HOUR: CPT

## 2024-06-17 PROCEDURE — 70450 CT HEAD/BRAIN W/O DYE: CPT

## 2024-06-17 PROCEDURE — 255N000002 HC RX 255 OP 636: Performed by: HOSPITALIST

## 2024-06-17 PROCEDURE — 97530 THERAPEUTIC ACTIVITIES: CPT | Mod: GO

## 2024-06-17 PROCEDURE — 97530 THERAPEUTIC ACTIVITIES: CPT | Mod: GP | Performed by: PHYSICAL THERAPIST

## 2024-06-17 PROCEDURE — 250N000011 HC RX IP 250 OP 636: Mod: JZ

## 2024-06-17 PROCEDURE — A9585 GADOBUTROL INJECTION: HCPCS | Performed by: HOSPITALIST

## 2024-06-17 PROCEDURE — 85610 PROTHROMBIN TIME: CPT

## 2024-06-17 PROCEDURE — 120N000001 HC R&B MED SURG/OB

## 2024-06-17 PROCEDURE — 85730 THROMBOPLASTIN TIME PARTIAL: CPT

## 2024-06-17 RX ORDER — FUROSEMIDE 10 MG/ML
60 INJECTION INTRAMUSCULAR; INTRAVENOUS 2 TIMES DAILY
Status: DISCONTINUED | OUTPATIENT
Start: 2024-06-17 | End: 2024-06-18

## 2024-06-17 RX ORDER — WARFARIN SODIUM 5 MG/1
5 TABLET ORAL
Status: COMPLETED | OUTPATIENT
Start: 2024-06-17 | End: 2024-06-17

## 2024-06-17 RX ORDER — IOPAMIDOL 755 MG/ML
117 INJECTION, SOLUTION INTRAVASCULAR ONCE
Status: COMPLETED | OUTPATIENT
Start: 2024-06-17 | End: 2024-06-17

## 2024-06-17 RX ORDER — GADOBUTROL 604.72 MG/ML
12 INJECTION INTRAVENOUS ONCE
Status: COMPLETED | OUTPATIENT
Start: 2024-06-17 | End: 2024-06-17

## 2024-06-17 RX ORDER — CARVEDILOL 12.5 MG/1
12.5 TABLET ORAL 2 TIMES DAILY WITH MEALS
Status: DISCONTINUED | OUTPATIENT
Start: 2024-06-17 | End: 2024-06-18

## 2024-06-17 RX ADMIN — FUROSEMIDE 60 MG: 10 INJECTION, SOLUTION INTRAMUSCULAR; INTRAVENOUS at 13:50

## 2024-06-17 RX ADMIN — SODIUM CHLORIDE 100 ML: 9 INJECTION, SOLUTION INTRAVENOUS at 17:12

## 2024-06-17 RX ADMIN — LEVETIRACETAM 4500 MG: 100 INJECTION, SOLUTION INTRAVENOUS at 18:13

## 2024-06-17 RX ADMIN — GADOBUTROL 12 ML: 604.72 INJECTION INTRAVENOUS at 22:49

## 2024-06-17 RX ADMIN — FUROSEMIDE 60 MG: 10 INJECTION, SOLUTION INTRAVENOUS at 08:43

## 2024-06-17 RX ADMIN — EMPAGLIFLOZIN 10 MG: 10 TABLET, FILM COATED ORAL at 08:37

## 2024-06-17 RX ADMIN — IOPAMIDOL 117 ML: 755 INJECTION, SOLUTION INTRAVENOUS at 17:12

## 2024-06-17 RX ADMIN — ENOXAPARIN SODIUM 135 MG: 150 INJECTION SUBCUTANEOUS at 08:46

## 2024-06-17 RX ADMIN — CARVEDILOL 6.25 MG: 6.25 TABLET, FILM COATED ORAL at 08:37

## 2024-06-17 RX ADMIN — ENOXAPARIN SODIUM 135 MG: 150 INJECTION SUBCUTANEOUS at 20:07

## 2024-06-17 ASSESSMENT — ACTIVITIES OF DAILY LIVING (ADL)
ADLS_ACUITY_SCORE: 26
ADLS_ACUITY_SCORE: 27
ADLS_ACUITY_SCORE: 26
ADLS_ACUITY_SCORE: 27
ADLS_ACUITY_SCORE: 26
ADLS_ACUITY_SCORE: 27
ADLS_ACUITY_SCORE: 27
ADLS_ACUITY_SCORE: 26
ADLS_ACUITY_SCORE: 26
ADLS_ACUITY_SCORE: 27
ADLS_ACUITY_SCORE: 26

## 2024-06-17 NOTE — PROGRESS NOTES
[facility]    Cardiology Progress Note    Primary Cardiologist: Dr. Sung    Date of Admission: 6/12/2024  Service Date: 06/17/2024     Summary:  Mr. Cirilo Gardner is a very pleasant 63 year old male with a past medical history of obesity and no other known medical history (has not seen a doctor in many years) who was admitted on 6/12/2024 after presenting with confusion and expressive/receptive aphasia along with a right visual cut and being found to have an acute left MCA stroke. Cardiology was consulted as the patient was found to have a new diagnosis of a cardiomyopathy with severe LV systolic dysfunction, ejection fraction 15-20% and LV apical thrombus on echocardiogram.    Interval History   No acute events overnight. Patient resting comfortably in recliner. Denies chest pain. Has occasional shortness of breath with exertion. Down 2#. Net -1.0 L. Electrolytes and creatinine within normal limits.       Telemetry: Sinus rhythm, HR 69     Assessment:  Severe LV systolic dysfunction (newly diagnosed), ejection fraction 15-20%  - Etiology: Unclear at this time--Ischemic vs. other  - Fluid status: Challenging to assess due to body habitus but appears volume overloaded with at least 1+ LE edema bilaterally. EDW Unknown--Most likely closer to 270-275 lbs or possibly lower. Patient poor historian  - Diuretic regimen: None PTA.   - Ischemic evaluation: Needs to be completed.     Guideline directed medical therapy (GDMT):  - Beta blocker: Carvedilol 6.25 mg BID  - ACEI/ARB/ARNI: lisinopril stopped 6/15 with plan to switch to low dose Entresto after 36 hr washout period.   - Aldactone antagonist: None currently.  - SGLT2 inhibitor: Jardiance 10 mg once daily.    LV apical thrombus measuring 1.5 x 1.9 cm  Acute left MCA stroke likely due to #2  Severe expressive/receptive aphasia  Confusion  Mildly elevated thrombus without delta, likely demand  Obesity    Plan:   1.  Continue cardiology  medication regimen    -atorvastatin 40 mg daily    -carvedilol 6.25 mg BID    -jardiance 10 mg daily   2.  Start Entresto 24-26 mg BID to optimize GDMT  3.  Increase IV lasix to 60 mg BID   4.  If patient close to being euvolemic tomorrow, will proceed with a coronary angiogram to complete ischemic evaluation   5.  Hold warfarin. Goal INR <1.6 to proceed with coronary angiogram tomorrow   6.  Daily standing weight, strict I/O, daily BMP and repletion of electrolytes   7. Coordinating outpatient follow up   8. Cardiology to follow     Plan of care was formulated under the direction and guidance of Dr. Barraza.         Ashley Yao PA-C  Physician Assistant   [facility]  Pager: [phone]    Patient Active Problem List   Diagnosis    Confusion    Elevated troponin    Visual field cut    Acute CVA (cerebrovascular accident) (H)       Physical Exam   Temp: 97.4  F (36.3  C) Temp src: Axillary BP: 129/84 Pulse: 74   Resp: 18 SpO2: 95 % O2 Device: None (Room air)    Vitals:    06/13/24 0700 06/16/24 1354 06/17/24 0906   Weight: 129 kg (284 lb 6.3 oz) 125.1 kg (275 lb 14.4 oz) 123.8 kg (273 lb)     Vital Signs with Ranges  Temp:  [97.2  F (36.2  C)-98.7  F (37.1  C)] 97.4  F (36.3  C)  Pulse:  [65-80] 74  Resp:  [17-18] 18  BP: (127-162)/() 129/84  SpO2:  [93 %-95 %] 95 %  I/O last 3 completed shifts:  In: 750 [P.O.:750]  Out: 1750 [Urine:1750]    General: Appears his stated age, well nourished, and in no acute distress, resting in recliner   Eyes: No scleral icterus.  HEENT: Neck supple. Mildly elevated JVD  Cardiovascular: Regular rate and rhythm, normal S1 and S2. No murmur, rub, or gallop.  Extremities: Moves all extremities well and symmetrically. There is at least 1+ lower extremity edema in the ankles to the knees bilaterally.  Respiratory: Breathing non-labored. Lungs clear to auscultation with no crackles or wheezes bilaterally.  Skin: No pallor or cyanosis.  Gastrointestinal:  Non-distended.  Psych: Appropriate affect. Mentation normal.  Neurological: No gross focal deficits.    Medications   Current Facility-Administered Medications   Medication Dose Route Frequency Provider Last Rate Last Admin    medication instruction - No oral meds if patient didn't pass dysphagia screen   Does not apply Continuous PRN Cameron Frederick PA-C        Medication Instructions - Avoid dextrose in IV solutions.   Intravenous Continuous PRN Cameron Frederick PA-C         Current Facility-Administered Medications   Medication Dose Route Frequency Provider Last Rate Last Admin    atorvastatin (LIPITOR) tablet 40 mg  40 mg Oral QPM Radha Haney PA-C   40 mg at 06/16/24 1949    carvedilol (COREG) tablet 6.25 mg  6.25 mg Oral BID w/meals Margarita Marvin CNP   6.25 mg at 06/17/24 0837    empagliflozin (JARDIANCE) tablet 10 mg  10 mg Oral Daily Margarita Marvin CNP   10 mg at 06/17/24 0837    enoxaparin ANTICOAGULANT (LOVENOX) injection 135 mg  1 mg/kg Subcutaneous Q12H Luciano Gallagher MD   135 mg at 06/17/24 0846    furosemide (LASIX) injection 60 mg  60 mg Intravenous Daily Colton Mejias NP   60 mg at 06/17/24 0843    insulin aspart (NovoLOG) injection (RAPID ACTING)  1-7 Units Subcutaneous TID AC Louisa Townsend DO        insulin aspart (NovoLOG) injection (RAPID ACTING)  1-5 Units Subcutaneous At Bedtime Louisa Townsend DO        sodium chloride (PF) 0.9% PF flush 3 mL  3 mL Intracatheter Q8H Cameron Frederick PA-C   3 mL at 06/17/24 0921    Warfarin Dose Required Daily - Pharmacist Managed  1 each Oral See Admin Instructions Luciano Gallagher MD         Data   Recent Labs   Lab 06/16/24  0731 06/15/24  0612 06/14/24  1103   WBC 8.2 8.6 10.6   HGB 15.3 14.2 16.1   HCT 47.2 44.5 48.7   MCV 97 96 95    281 334     Recent Labs   Lab 06/17/24  0752 06/17/24  0243 06/16/24  2157 06/16/24  2128 06/16/24  1656 06/16/24  1413 06/15/24  0806  06/15/24  0612 06/14/24  1231 06/14/24  1103   NA  --   --   --   --   --  140  --  139  --  138   POTASSIUM  --   --  3.6  --   --  3.3*  --  3.4  --  4.1   CHLORIDE  --   --   --   --   --  102  --  102  --  102   CO2  --   --   --   --   --  25  --  24  --  21*   ANIONGAP  --   --   --   --   --  13  --  13  --  15   * 120*  --  107*   < > 144*   < > 108*   < > 121*   BUN  --   --   --   --   --  26.4*  --  30.1*  --  30.0*   CR  --   --   --   --   --  1.15  --  1.07  --  1.02   GFRESTIMATED  --   --   --   --   --  71  --  77  --  82   PARISH  --   --   --   --   --  9.2  --  8.9  --  9.6    < > = values in this interval not displayed.        Please kindly note that this document was completed in part using Dragon voice recognition software. Although reviewed after completion, some word substitutions and typographical errors may occur. Please contact me if clarification is needed.

## 2024-06-17 NOTE — CONSULTS
[facility]    Stroke Telephone Note    I was called by Redd Saavedra on 06/17/24 regarding patient Cirilo Gardner. The patient is a 63 year old man admitted with subacute infarct of L temporal/occipital and L parasagittal frontal regions, petechial hemorrhage of temporal/occipital infarct possibly cardioembolic in etiology secondary to LV apical thrombus (EF of 15%) on Lovenox being bridged to Warfarin having acute onset of R sided facial droop, R sided weakness with R UE no movement and some movement in R LE, aphasic (was neuro intact per Stroke neuro notes from yesterday). LKW was 15:45    Vitals  BP: (!) 168/129   Pulse: 93   Resp: 18   Temp: 99.1  F (37.3  C)   Weight: 123.8 kg (273 lb)    Stroke Code Data (for stroke code without tele)  Stroke code activated 06/17/24  1654   Stroke provider first response 06/17/24  1656   Last known normal 06/17/24  1545  Unknown   Time of discovery (or onset of symptoms) 06/17/24  0600   Head CT read by Stroke Neuro Provider 06/17/24  1717   Was stroke code de-escalated? Yes  06/17/24  1738     Imaging Findings  CT head: No acute ischemia - kisha evolution of known infarcted areas in the left temporal/occipital region with e/o hemorrhagic trasnformation that was present on CT from 6/14  CTA head/neck: No LVO/critical stenoses    Intravenous Thrombolysis  Not given due to:   - head trauma/stroke within the past 3 months    Endovascular Treatment  Not initiated due to absence of proximal vessel occlusion    Impression  New acute onset aphasia and Right sided weakness in patient with subacute L temporal/occipital and L parasagittal frontal regions, petechial hemorrhage of temporal/occipital infarct    Concern highest for unwitnessed seizures causing Felipe's palsy.     Recommendations   - Keppra load of 4.5 g STAT  - Keppra maintenance of 1.5 g BID  - Neuro checks q 2 hr IMC (if mentation becomes supressed overnight, will need to shift to q 1 hr  "Neuro and possible ICU transfer)  - Repeat MRI Brain w/wo contrast; page Stroke Neurology to assess for new strokes   - Continue Lovenox and Warfarin for now.    My recommendations are based on the information provided over the phone by Cirilo Gardner's in-person providers. They are not intended to replace the clinical judgment of his in-person providers. I was not requested to personally see or examine the patient at this time.     The Stroke Staff is Dr. Reeves.    Trista Glover MD  Vascular Neurology Fellow    To page me or covering stroke neurology team member, click here: [facility]  Choose \"On Call\" tab at top, then select \"NEUROLOGY/ALL SITES\" from middle drop-down box, press Enter, then look for \"stroke\" or \"telestroke\" for your site.   "

## 2024-06-17 NOTE — PROGRESS NOTES
"[facility]    Hospitalist Progress Note    Interval History   - No significant change in aphasia  - Diuresing per cards  - TCU dispo pending completion of diuresis per cards  - Friend Jo-Ann called, not answered Addendum: Called back and updated    Assessment & Plan   Summary: Cirilo Gardner is a 63 year old male with no significant PMH who was admitted on 6/12/2024 with acute posterior left MCA infarction with symptoms of mixed expressive/receptive aphasia and right visual field cut. Found to have systolic heart failure and apical thombus likely etiology of stroke.    Acute ischemic stroke of posterior left MCA presumed 2/2 LV thrombus  Petechial hemorrhage on MRI  Mixed expressive/receptive aphasia  Right visual field cut  Brought to ED with confusion, patient reported was \"definitely not right\" at 1000, client called 911 at 1400. In the ED, was afebrile, tachycardic and hypertensive with SBPs 170s. Was noted to have expressive/receptive deficits and right visual field cut. Code stroke was called. Labs notable for WBC 11.4, lytes/Cr stable, ,  proBNP 12k, trops elevated (57 -- 83). EKS showed ST with PVCs, possible LAE; no ST/T changes. Head CT neg. CTA head/neck showed an age-indeterminate lack of flow in the left vertebral artery of uncertain etiology and mild atherosclerotic narrowing  at the R ICA origin but was otherwise nl. CT perfusion study showed delayed contrast arrival in the area of the posterolateral aspect of the left cerebral hemisphere involving portions of the left temporal and left occipital lobes; findings suggestive of infarct.   A1c 6.4, FLP with tot cholest 135, HDL 41, LDL 82.   Seen by stroke team. Felt to be out of the window for thrombolytics. Given ASA and Plavix load. Echo obtained and showed findings of an apical LV thrombus.  Findings were discussed with stroke service.  Aspirin and Plavix were stopped and he was placed on a low intensity heparin " drip on 6/13 PM.  MRI brain 6/14 showed a moderate-sized evolving infarct in the temporal/occipital lobes with petechial hemorrhage and a similar zone of evolving infarct in the parasagittal left frontal lobe.  Stroke neurology service was made aware of these findings given recent initiation of anticoagulation.  Was continued on heparin drip.   Bedside sitter ordered on 6/14 AM after patient attempted to leave AMA x2. Less confused, less aphasic on 6/15, stop bedside sitter. Patient transitioned to warfarin + Lovenox.  - Appreciate Neurology consultation  - Lovenox + warfarin  - Cont atorvastatin 40mg daily  - Tighten BP control, goal -160  - PT/OT, on modified diet per SLP    Acute LV thrombus  Newly diagnosed systolic CHF, chronicity unclear  NSTEMI, suspect type II dt demand ischemia in setting of CVA  Pulmonary hypertension  No known hx of CAD, CHF. On admission this stay, noted to have bilateral LE edema, patient mentioned had been there for several days though unclear in regards to the specifics. Trop trend as above. ProBNP significant elevated.  EKG nonischemic. No reports of chest pain.  *Echo obtained this stay and showed EF 15-20% with severe global hypokinesia of the LV and an apical LV thrombus. LV apex clot 1.5x1.9cm, non-mobile. Also noted to have findings of biatrial enlargement, mod decreased RVSF and mod pulmonary hypertension.   - Appreciate Cardiology consult   - ischemic workup pending, likely outpatient   - IV diuresis  - Anticoagulation as above  - Increase Coreg 6.25mg BID    Prediabetes  A1c 6.4 this stay.   - using sliding scale insulin while hospitalized  - follow up with PCP after discharge for ongoing BG mgmt    Clinically Significant Risk Factors        # Hypokalemia: Lowest K = 3.3 mmol/L in last 2 days, will replace as needed            # Acute heart failure with reduced ejection fraction: last echo with EF <40% and receiving IV diuretics             # Obesity: Estimated body  mass index is 37.03 kg/m  as calculated from the following:    Height as of this encounter: 1.829 m (6').    Weight as of this encounter: 123.8 kg (273 lb).              PT/OT: ordered  Diet: Room Service  Combination Diet Thin Liquids (level 0) (No Straws); Easy to Chew (level 7); Thin Liquids (level 0) (No straws); 2 gm NA Diet    DVT Prophylaxis: Lovenox + warfarin  Ravi Catheter: Not present  Lines: None     Cardiac Monitoring: ACTIVE order. Indication: Stroke, acute (48 hours)  Code Status: Full Code    Medically Ready for Discharge: Anticipated Tomorrow      Luciano Gallagher MD  Hospitalist Service  [facility]  Securely message with [employer] (more info)  Text page via [employer] Paging/Directory       Data reviewed today: I reviewed all new labs and imaging results over the last 24 hours.    Physical Exam   Temp: 97.4  F (36.3  C) Temp src: Axillary BP: 129/84 Pulse: 74   Resp: 18 SpO2: 95 % O2 Device: None (Room air)    Vitals:    06/13/24 0700 06/16/24 1354 06/17/24 0906   Weight: 129 kg (284 lb 6.3 oz) 125.1 kg (275 lb 14.4 oz) 123.8 kg (273 lb)     Vital Signs with Ranges  Temp:  [97.2  F (36.2  C)-98.7  F (37.1  C)] 97.4  F (36.3  C)  Pulse:  [65-80] 74  Resp:  [17-18] 18  BP: (127-162)/() 129/84  SpO2:  [93 %-95 %] 95 %  I/O last 3 completed shifts:  In: 750 [P.O.:750]  Out: 1750 [Urine:1750]  O2 requirements: none    Constitutional: Obese male in NAD  HEENT: Eyes nonicteric, oral mucosa moist  Cardiovascular: RRR, normal S1/2, no m/r/g  Respiratory: Basilar crackles  Vascular: 2-3+ BLE pitting edema  GI: Normoactive bowel sounds, nontender  Skin/Integumen: No rashes  Neuro/Psych: Appropriate affect and mood. A&Ox3, expressive aphasia, moves all extremities    Medications   Current Facility-Administered Medications   Medication Dose Route Frequency Provider Last Rate Last Admin    medication instruction - No oral meds if patient didn't pass dysphagia screen   Does not apply  Continuous PRN Cameron Frederick PA-C        Medication Instructions - Avoid dextrose in IV solutions.   Intravenous Continuous PRN Cameron Frederick PA-C         Current Facility-Administered Medications   Medication Dose Route Frequency Provider Last Rate Last Admin    atorvastatin (LIPITOR) tablet 40 mg  40 mg Oral QPM Radha Haney PA-C   40 mg at 06/16/24 1949    carvedilol (COREG) tablet 6.25 mg  6.25 mg Oral BID w/meals Margarita Marvin CNP   6.25 mg at 06/17/24 0837    empagliflozin (JARDIANCE) tablet 10 mg  10 mg Oral Daily Margarita Marvin CNP   10 mg at 06/17/24 0837    enoxaparin ANTICOAGULANT (LOVENOX) injection 135 mg  1 mg/kg Subcutaneous Q12H Luciano Gallagher MD   135 mg at 06/17/24 0846    furosemide (LASIX) injection 60 mg  60 mg Intravenous Daily Colton Mejias NP   60 mg at 06/17/24 0843    insulin aspart (NovoLOG) injection (RAPID ACTING)  1-7 Units Subcutaneous TID AC Louisa Townsend DO        insulin aspart (NovoLOG) injection (RAPID ACTING)  1-5 Units Subcutaneous At Bedtime Louisa Townsend DO        sodium chloride (PF) 0.9% PF flush 3 mL  3 mL Intracatheter Q8H Cameron Frederick PA-C   3 mL at 06/17/24 0921    Warfarin Dose Required Daily - Pharmacist Managed  1 each Oral See Admin Instructions Luciano Gallagher MD           Data   Recent Labs   Lab 06/17/24  0752 06/17/24  0243 06/16/24  2157 06/16/24  2128 06/16/24  1656 06/16/24  1413 06/16/24  0757 06/16/24  0731 06/15/24  2121 06/15/24  1726 06/15/24  1643 06/15/24  1512 06/15/24  0806 06/15/24  0612 06/14/24  1231 06/14/24  1103   WBC  --   --   --   --   --   --   --  8.2  --   --   --   --   --  8.6  --  10.6   HGB  --   --   --   --   --   --   --  15.3  --   --   --   --   --  14.2  --  16.1   MCV  --   --   --   --   --   --   --  97  --   --   --   --   --  96  --  95   PLT  --   --   --   --   --   --   --  310  --   --   --   --   --  281  --  334   INR  --   --    --   --   --   --   --  1.14  --  1.15  --  1.20*  --   --   --   --    NA  --   --   --   --   --  140  --   --   --   --   --   --   --  139  --  138   POTASSIUM  --   --  3.6  --   --  3.3*  --   --   --   --   --   --   --  3.4  --  4.1   CHLORIDE  --   --   --   --   --  102  --   --   --   --   --   --   --  102  --  102   CO2  --   --   --   --   --  25  --   --   --   --   --   --   --  24  --  21*   BUN  --   --   --   --   --  26.4*  --   --   --   --   --   --   --  30.1*  --  30.0*   CR  --   --   --   --   --  1.15  --   --   --   --   --   --   --  1.07  --  1.02   ANIONGAP  --   --   --   --   --  13  --   --   --   --   --   --   --  13  --  15   PARISH  --   --   --   --   --  9.2  --   --   --   --   --   --   --  8.9  --  9.6   * 120*  --  107*   < > 144*   < >  --    < >  --    < >  --    < > 108*   < > 121*    < > = values in this interval not displayed.       Imaging:   No results found for this or any previous visit (from the past 24 hour(s)).

## 2024-06-17 NOTE — PLAN OF CARE
Goal Outcome Evaluation:      Plan of Care Reviewed With: patient    Overall Patient Progress: improvingOverall Patient Progress: improving     Reason for Admission: L MCA    Cognitive/Mentation: A/Ox 4  Neuros/CMS: R field cut expressive/ receptive aphasia  VS: stable.   Tele: SR  /GI: Continent.   Pulmonary: LS clear.  Pain: denies.     Drains/Lines: piv patent and intact  Skin:   Activity: Assist x one with GBW.  Diet: NPO.     Therapies recs: pending  Discharge: pending    Aggression Stoplight Tool: green    End of shift summary: pt had RRT and code stroke on shift for R droop, right weakness and worsening aphasia. CT completed EKG completed. EEG and MRI needed at this time.

## 2024-06-17 NOTE — CONSULTS
Patient has [employer] through the [employer].    Jardiance:  $25/mo. Patient is eligible to download a copay savings card from [employer] to reduce this to $10/mo.     Farxiga:  $25/mo. Patient is eligible to download a copay savings card from [employer] to reduce this to $10/mo.     Entresto:  $200/mo. Patient is eligible to download a copay savings card from [employer] to reduce this to $10/mo.      Brie Contreras  Pharmacy Technician/Liaison, Discharge Pharmacy   [phone] (voice or text)  [email or web address]

## 2024-06-17 NOTE — PLAN OF CARE
[id number]  Patient here with left MCA CVA. A&Ox3 d/o to time- thought it was September. Appears to understand everything that's asked of him and able to follow all commands. Expressive aphasia/moderate WFD. Slow/deliberate with finger/nose. Other neuros intact. VSS on RA. Tele NSR. Up with SBA. Daily weights/strict I&Os. Tolerating soft/bite sized diet/2G Na restriction with thin liquids. Therapies recommending TCU vs. ARU. Ziopatches ordered for discharge.

## 2024-06-17 NOTE — PROGRESS NOTES
Care Management Follow Up    Length of Stay (days): 5    Expected Discharge Date: 06/18/2024     Concerns to be Addressed: discharge planning   Patient plan of care discussed at interdisciplinary rounds: Yes    Anticipated Discharge Disposition: Transitional Care    Referrals Placed by CM/BRIANNA:  TCU  Private pay costs discussed: Not applicable    Additional Information:  Patient asleep when SW stopped in, so SW called brother about TCU preferences. He said he doesn't think patient totally understands what he is considering when discussing TCU so he would like updates on placement. He is agreeable in having SW sent referrals to TCU sites close to [place] with good medicare ratings to begin search. Possibly medically ready tomorrow but there are still things pending here. Referrals sent.    ADD: [id number]  Patient accepted to [facility] TCU. Updated hospitalist and we still have a couple days possibly before he is medically ready. [place] from Jo-Ann in admissions at  ARU as well and they are going to continue to follow patient because he is looking more appropriate again for ARU.     Evelin Kruse, MSW, LGSW   Social Work   [facility]

## 2024-06-17 NOTE — CODE/RAPID RESPONSE
[facility]    Code Stroke  House Officer Note    ////////////////////////////////////////////////////////////////////////////////////////////////////////////////////////////////  Acute stroke evaluation:  Time of arrival: 1645   Time called: 1654   Glucose level 152   Time patient seen by neurology: Tele stroke 1654   Time onset of stroke symptoms / witnessed onset: 1644   Time last known well: 1545   IV tPA admistered: N/A   IA / Thrombolectomy N/A   Stroke Presentation Type Inhouse Stroke   Stroke Thrombolytic Ineligible Reason N/A   Stroke Thrombolytic Treatments N/A   Neurologists Dr. Glover   Stroke Type of Vascular Event N/A   Pre TPa Wts entered? Yes   Post TPa VS Neuro Checks Q2h neuro checks        [employer] Stroke Scale  Exam Interval: Baseline   Score    Level of consciousness: (0)   Alert, keenly responsive    LOC questions: (2)   Answers neither question correctly    LOC commands: (1)   Performs one task correctly    Best gaze: (1)   Partial gaze palsy    Visual: (0)   No visual loss    Facial palsy: (2)   Partial paralysis (total/near total of lower face)    Motor arm (left): (0)   No drift    Motor arm (right): (4)   No movement    Motor leg (left): (0)   No drift    Motor leg (right): (2)   Some effort against gravity    Limb ataxia: (0)   Absent    Sensory: (0)   Normal- no sensory loss    Best language: (2)   Severe aphasia    Dysarthria: (1)   Mild to moderate dysarthria    Extinction and inattention: (0)   No abnormality        Total Score:  15       Imaging:  Recent Results (from the past 24 hour(s))   CT Head w/o Contrast    Narrative    EXAM: CT HEAD W/O CONTRAST, CTA HEAD NECK W CONTRAST  LOCATION: [facility]  DATE/TIME: 6/17/2024 5:18 PM CDT    INDICATION: Code Stroke; right-sided weakness, facial droop, expressive aphasia  COMPARISON:  MRI brain 6/14/2024, CTA head and neck 6/12/2024.  CONTRAST: 67 mL Isovue  370  TECHNIQUE: Head and neck CT angiogram with IV contrast. Noncontrast head CT followed by axial helical CT images of the head and neck vessels obtained during the arterial phase of intravenous contrast administration. Axial 2D reconstructed images and   multiplanar 3D MIP reconstructed images of the head and neck vessels were performed by the technologist. Dose reduction techniques were used. All stenosis measurements made according to NASCET criteria unless otherwise specified.    FINDINGS:   NONCONTRAST HEAD CT:   INTRACRANIAL CONTENTS: Evolving subacute left anterior and posterior border zone infarcts with redemonstration of left temporal petechial hemorrhage. Trace left temporal subarachnoid hemorrhage. No maged hematoma formation. Mild local mass effect without   significant midline shift. No hydrocephalus. No new territory of involvement detected.    VISUALIZED ORBITS/SINUSES/MASTOIDS: No intraorbital abnormality.  Left maxillary sinus mucosal disease. No middle ear or mastoid effusion.    BONES/SOFT TISSUES: No acute abnormality.    HEAD CTA:  ANTERIOR CIRCULATION: No stenosis/occlusion, aneurysm, or high flow vascular malformation. Standard [facility] anatomy.    POSTERIOR CIRCULATION: Redemonstration of left vertebral artery occlusion with minimal retrograde filling of the distal V4. No aneurysm or high flow vascular malformation.    DURAL VENOUS SINUSES: Not well evaluated on a technical basis.    NECK CTA:  RIGHT CAROTID: No measurable stenosis or dissection.    LEFT CAROTID: Soft and calcified atherosclerotic plaque results in less than 25% stenosis in the proximal ICA. No dissection.    VERTEBRAL ARTERIES: No focal stenosis or dissection on the right. Unchanged left vertebral artery occlusion.    AORTIC ARCH: Bovine origin left common carotid artery. No significant stenosis at the origin of the great vessels.    NONVASCULAR STRUCTURES:  Right pleural effusion.      Impression    IMPRESSION:    HEAD CT:  1.  Evolving subacute left anterior and posterior border zone infarcts with redemonstration of left frontoparietal petechial hemorrhage.  2.  Trace left temporal subarachnoid hemorrhage.  3.  No maged hematoma, progressive mass effect or convincing new territory of involvement.    HEAD AND NECK CTA:   No significant change since 6/12/2024.  1.  Unchanged occluded left vertebral artery from the origin to the distal V4 segment.  2.  The remaining major vessels of the head and neck are patent without flow-limiting stenosis.    Dr. Glover was contacted by me on 6/17/2024 5:36 PM CDT with the preliminary report.   CTA Head Neck with Contrast    Narrative    EXAM: CT HEAD W/O CONTRAST, CTA HEAD NECK W CONTRAST  LOCATION: [facility]  DATE/TIME: 6/17/2024 5:18 PM CDT    INDICATION: Code Stroke; right-sided weakness, facial droop, expressive aphasia  COMPARISON:  MRI brain 6/14/2024, CTA head and neck 6/12/2024.  CONTRAST: 67 mL Isovue 370  TECHNIQUE: Head and neck CT angiogram with IV contrast. Noncontrast head CT followed by axial helical CT images of the head and neck vessels obtained during the arterial phase of intravenous contrast administration. Axial 2D reconstructed images and   multiplanar 3D MIP reconstructed images of the head and neck vessels were performed by the technologist. Dose reduction techniques were used. All stenosis measurements made according to NASCET criteria unless otherwise specified.    FINDINGS:   NONCONTRAST HEAD CT:   INTRACRANIAL CONTENTS: Evolving subacute left anterior and posterior border zone infarcts with redemonstration of left temporal petechial hemorrhage. Trace left temporal subarachnoid hemorrhage. No maged hematoma formation. Mild local mass effect without   significant midline shift. No hydrocephalus. No new territory of involvement detected.    VISUALIZED ORBITS/SINUSES/MASTOIDS: No intraorbital abnormality.  Left maxillary sinus mucosal  disease. No middle ear or mastoid effusion.    BONES/SOFT TISSUES: No acute abnormality.    HEAD CTA:  ANTERIOR CIRCULATION: No stenosis/occlusion, aneurysm, or high flow vascular malformation. Standard [place] anatomy.    POSTERIOR CIRCULATION: Redemonstration of left vertebral artery occlusion with minimal retrograde filling of the distal V4. No aneurysm or high flow vascular malformation.    DURAL VENOUS SINUSES: Not well evaluated on a technical basis.    NECK CTA:  RIGHT CAROTID: No measurable stenosis or dissection.    LEFT CAROTID: Soft and calcified atherosclerotic plaque results in less than 25% stenosis in the proximal ICA. No dissection.    VERTEBRAL ARTERIES: No focal stenosis or dissection on the right. Unchanged left vertebral artery occlusion.    AORTIC ARCH: Bovine origin left common carotid artery. No significant stenosis at the origin of the great vessels.    NONVASCULAR STRUCTURES:  Right pleural effusion.      Impression    IMPRESSION:   HEAD CT:  1.  Evolving subacute left anterior and posterior border zone infarcts with redemonstration of left frontoparietal petechial hemorrhage.  2.  Trace left temporal subarachnoid hemorrhage.  3.  No maged hematoma, progressive mass effect or convincing new territory of involvement.    HEAD AND NECK CTA:   No significant change since 6/12/2024.  1.  Unchanged occluded left vertebral artery from the origin to the distal V4 segment.  2.  The remaining major vessels of the head and neck are patent without flow-limiting stenosis.    Dr. Glover was contacted by me on 6/17/2024 5:36 PM CDT with the preliminary report.       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  "Jj for acute neurological changes. He was admitted on 6/12/2024 with acute posterior left MCA infarction with symptoms of mixed expressive/receptive aphasia and right visual field cut. He was thought to be out of the window for thrombolytics, but was given ASA and Plavix load. LV thrombus found on echo thought to be origin. He was started on Heparin, then transitioned to Lovenox to then bridge to warfarin. On the evening of 6/17, at approximately 1644, the patient's nurse noted a new right facial droop as well as profound right sided weakness and garbled speech, prompting her to call an RRT.   Upon my arrival, the patient was awake and alert, but with significant expressive aphasia. A right facial droop was clearly visualized and he was unable to move his right arm. A code stroke was called. The case was discussed with stroke neurology, including assessment findings and results of imaging studies. New symptoms thought most likely caused by \"Felipe's palsy\" following unwitnessed seizure. He will receive Keppra load and plan for follow up MRI with possible EEG if symptoms persist.       Stroke risk factors  Age, hypertension, recent stroke    INTERVENTIONS:  -Consult stroke neurology  -Non contrast head CT- Evolving subacute left anterior and posterior border zone infarcts with redemonstration of left frontoparietal petechial hemorrhage. Trace left temporal subarachnoid hemorrhage. No maged hematoma, progressive mass effect or convincing new territory of involvement.  -CTA-No significant change since 6/12/2024. Unchanged occluded left vertebral artery from the origin to the distal V4 segment. The remaining major vessels of the head and neck are patent without flow-limiting stenosis.  -CT perfusion-No convincing new territory of involvement, as imaged   -Q2h neuro checks  -Keppra load  -Scheduled Keppra dosing  -Continuous telemetry monitoring  -EKG-SR with frequent PVCs    Interval History     Cirilo Gardner " is a 63 year old male who was admitted on 6/12/2024 for acute posterior left MCA infarction with symptoms of mixed expressive/receptive aphasia and right visual field cut. Found to have systolic heart failure and apical thombus likely etiology of stroke. .    Medical history significant for: No significant past medical history    Code Status:Full Code    Physical Exam   Physical Exam  Cardiovascular:      Rate and Rhythm: Normal rate.   Pulmonary:      Effort: Pulmonary effort is normal.   Skin:     General: Skin is warm and dry.   Neurological:      Mental Status: He is alert.      GCS: GCS eye subscore is 4. GCS verbal subscore is 4. GCS motor subscore is 6.      Sensory: Sensation is intact.      Motor: Weakness present.      Comments: Profound right upper extremity weakness, positive right facial droop. Expressive aphasia, slurred garbled speech, word finding difficulty.   Psychiatric:         Speech: Speech is slurred and tangential.         Behavior: Behavior is cooperative.     Data   Recent Labs   Lab 06/17/24  1026 06/16/24  0731 06/15/24  1726 06/15/24  1512 06/15/24  0612 06/14/24  1103   WBC  --  8.2  --   --  8.6 10.6   HGB  --  15.3  --   --  14.2 16.1   MCV  --  97  --   --  96 95   PLT  --  310  --   --  281 334   INR 1.20* 1.14 1.15   < >  --   --     < > = values in this interval not displayed.     Time Spent on this Encounter   I spent 60 minutes of critical care time on the unit/floor managing the care of Cirilo Gardner. Upon evaluation, this patient had a high probability of imminent or life-threatening deterioration, which required my direct attention, intervention, and personal management. 100% of my time was spent at the bedside counseling the patient and/or coordinating care regarding services listed in this note.    Dung Cornelius NP      .

## 2024-06-17 NOTE — PLAN OF CARE
Date/Time: 6/16/24 [id number]    Summary: 64 y/o M admitted on 6/12 with symptoms of mixed expressive/receptive aphasia and R visual field cut. Was found to have systolic heart failure and apical thrombus.  Diagnosis: acute poster L MCA infarction.  Orientation: A&Ox4, if asked yes/no questions; has difficulty finding the words to describe the answers.  Behavior & Aggression: green  Activity: SBA  Diet: soft bite sized, 2 g Na, thin liquids.  Fall risk: no  Isolation: N/A  Pain: denies.  B&B: continent, no BM this shift.  VS/O2: VSS, RA. BP elevated.  IV: L PIV, SL.  Drains/Devices: N/A  Tele: SR  Abnormal Labs:  @ 0200.  Skin: feet were very red, blanchable. Has BLE edema.  Consults/Procedures: N/A  D/C Date: pending TCU placement.  Other: neuros intact.

## 2024-06-18 ENCOUNTER — APPOINTMENT (OUTPATIENT)
Dept: GENERAL RADIOLOGY | Facility: CLINIC | Age: 64
DRG: 064 | End: 2024-06-18
Attending: INTERNAL MEDICINE
Payer: COMMERCIAL

## 2024-06-18 ENCOUNTER — HOSPITAL ENCOUNTER (INPATIENT)
Dept: NEUROLOGY | Facility: CLINIC | Age: 64
Discharge: HOME | DRG: 064 | End: 2024-06-18
Attending: PSYCHIATRY & NEUROLOGY
Payer: COMMERCIAL

## 2024-06-18 ENCOUNTER — APPOINTMENT (OUTPATIENT)
Dept: CARDIOLOGY | Facility: CLINIC | Age: 64
DRG: 064 | End: 2024-06-18
Attending: INTERNAL MEDICINE
Payer: COMMERCIAL

## 2024-06-18 ENCOUNTER — APPOINTMENT (OUTPATIENT)
Dept: SPEECH THERAPY | Facility: CLINIC | Age: 64
DRG: 064 | End: 2024-06-18
Attending: PSYCHIATRY & NEUROLOGY
Payer: COMMERCIAL

## 2024-06-18 ENCOUNTER — APPOINTMENT (OUTPATIENT)
Dept: OCCUPATIONAL THERAPY | Facility: CLINIC | Age: 64
DRG: 064 | End: 2024-06-18
Attending: PSYCHIATRY & NEUROLOGY
Payer: COMMERCIAL

## 2024-06-18 LAB
ANION GAP SERPL CALCULATED.3IONS-SCNC: 13 MMOL/L (ref 7–15)
BUN SERPL-MCNC: 17.9 MG/DL (ref 8–23)
CALCIUM SERPL-MCNC: 9 MG/DL (ref 8.8–10.2)
CHLORIDE SERPL-SCNC: 105 MMOL/L (ref 98–107)
CREAT SERPL-MCNC: 0.94 MG/DL (ref 0.67–1.17)
DEPRECATED HCO3 PLAS-SCNC: 24 MMOL/L (ref 22–29)
EGFRCR SERPLBLD CKD-EPI 2021: >90 ML/MIN/1.73M2
GLUCOSE BLDC GLUCOMTR-MCNC: 147 MG/DL (ref 70–99)
GLUCOSE BLDC GLUCOMTR-MCNC: 99 MG/DL (ref 70–99)
GLUCOSE SERPL-MCNC: 96 MG/DL (ref 70–99)
HOLD SPECIMEN: NORMAL
INR PPP: 1.28 (ref 0.85–1.15)
POTASSIUM SERPL-SCNC: 3.5 MMOL/L (ref 3.4–5.3)
SODIUM SERPL-SCNC: 142 MMOL/L (ref 135–145)
TROPONIN T SERPL HS-MCNC: 72 NG/L

## 2024-06-18 PROCEDURE — 97112 NEUROMUSCULAR REEDUCATION: CPT | Mod: GO

## 2024-06-18 PROCEDURE — 250N000013 HC RX MED GY IP 250 OP 250 PS 637: Performed by: INTERNAL MEDICINE

## 2024-06-18 PROCEDURE — 80048 BASIC METABOLIC PNL TOTAL CA: CPT | Performed by: INTERNAL MEDICINE

## 2024-06-18 PROCEDURE — 95816 EEG AWAKE AND DROWSY: CPT

## 2024-06-18 PROCEDURE — 97535 SELF CARE MNGMENT TRAINING: CPT | Mod: GO

## 2024-06-18 PROCEDURE — 97168 OT RE-EVAL EST PLAN CARE: CPT | Mod: GO

## 2024-06-18 PROCEDURE — 258N000003 HC RX IP 258 OP 636: Mod: JZ | Performed by: STUDENT IN AN ORGANIZED HEALTH CARE EDUCATION/TRAINING PROGRAM

## 2024-06-18 PROCEDURE — 92611 MOTION FLUOROSCOPY/SWALLOW: CPT | Mod: GN | Performed by: SPEECH-LANGUAGE PATHOLOGIST

## 2024-06-18 PROCEDURE — 75571 CT HRT W/O DYE W/CA TEST: CPT

## 2024-06-18 PROCEDURE — 92526 ORAL FUNCTION THERAPY: CPT | Mod: GN | Performed by: SPEECH-LANGUAGE PATHOLOGIST

## 2024-06-18 PROCEDURE — 99232 SBSQ HOSP IP/OBS MODERATE 35: CPT | Mod: GC | Performed by: PSYCHIATRY & NEUROLOGY

## 2024-06-18 PROCEDURE — 99232 SBSQ HOSP IP/OBS MODERATE 35: CPT | Performed by: INTERNAL MEDICINE

## 2024-06-18 PROCEDURE — 85610 PROTHROMBIN TIME: CPT | Performed by: INTERNAL MEDICINE

## 2024-06-18 PROCEDURE — 92507 TX SP LANG VOICE COMM INDIV: CPT | Mod: GN | Performed by: SPEECH-LANGUAGE PATHOLOGIST

## 2024-06-18 PROCEDURE — 250N000013 HC RX MED GY IP 250 OP 250 PS 637: Performed by: PHYSICIAN ASSISTANT

## 2024-06-18 PROCEDURE — 84484 ASSAY OF TROPONIN QUANT: CPT | Performed by: STUDENT IN AN ORGANIZED HEALTH CARE EDUCATION/TRAINING PROGRAM

## 2024-06-18 PROCEDURE — 250N000011 HC RX IP 250 OP 636: Mod: JZ | Performed by: INTERNAL MEDICINE

## 2024-06-18 PROCEDURE — 95816 EEG AWAKE AND DROWSY: CPT | Mod: 26 | Performed by: PSYCHIATRY & NEUROLOGY

## 2024-06-18 PROCEDURE — 36415 COLL VENOUS BLD VENIPUNCTURE: CPT | Performed by: INTERNAL MEDICINE

## 2024-06-18 PROCEDURE — 250N000013 HC RX MED GY IP 250 OP 250 PS 637

## 2024-06-18 PROCEDURE — 250N000011 HC RX IP 250 OP 636: Mod: JZ

## 2024-06-18 PROCEDURE — 250N000013 HC RX MED GY IP 250 OP 250 PS 637: Performed by: HOSPITALIST

## 2024-06-18 PROCEDURE — 250N000011 HC RX IP 250 OP 636: Mod: JZ | Performed by: STUDENT IN AN ORGANIZED HEALTH CARE EDUCATION/TRAINING PROGRAM

## 2024-06-18 PROCEDURE — 99233 SBSQ HOSP IP/OBS HIGH 50: CPT | Mod: FS

## 2024-06-18 PROCEDURE — 120N000013 HC R&B IMCU

## 2024-06-18 PROCEDURE — 74230 X-RAY XM SWLNG FUNCJ C+: CPT

## 2024-06-18 PROCEDURE — 75571 CT HRT W/O DYE W/CA TEST: CPT | Mod: 26 | Performed by: INTERNAL MEDICINE

## 2024-06-18 RX ORDER — BARIUM SULFATE 400 MG/ML
SUSPENSION ORAL ONCE
Status: COMPLETED | OUTPATIENT
Start: 2024-06-18 | End: 2024-06-18

## 2024-06-18 RX ORDER — NITROGLYCERIN 0.4 MG/1
0.4 TABLET SUBLINGUAL
Status: DISCONTINUED | OUTPATIENT
Start: 2024-06-18 | End: 2024-06-18

## 2024-06-18 RX ORDER — DIPHENHYDRAMINE HCL 25 MG
25 CAPSULE ORAL
Status: DISCONTINUED | OUTPATIENT
Start: 2024-06-18 | End: 2024-06-18

## 2024-06-18 RX ORDER — CARVEDILOL 25 MG/1
25 TABLET ORAL 2 TIMES DAILY WITH MEALS
Status: DISCONTINUED | OUTPATIENT
Start: 2024-06-18 | End: 2024-06-20 | Stop reason: HOSPADM

## 2024-06-18 RX ORDER — IOPAMIDOL 755 MG/ML
500 INJECTION, SOLUTION INTRAVASCULAR ONCE
Status: DISCONTINUED | OUTPATIENT
Start: 2024-06-18 | End: 2024-06-20 | Stop reason: HOSPADM

## 2024-06-18 RX ORDER — DILTIAZEM HYDROCHLORIDE 120 MG/1
120 TABLET, FILM COATED ORAL
Status: DISCONTINUED | OUTPATIENT
Start: 2024-06-18 | End: 2024-06-18

## 2024-06-18 RX ORDER — DILTIAZEM HYDROCHLORIDE 5 MG/ML
10-15 INJECTION INTRAVENOUS
Status: DISCONTINUED | OUTPATIENT
Start: 2024-06-18 | End: 2024-06-18

## 2024-06-18 RX ORDER — DIPHENHYDRAMINE HYDROCHLORIDE 50 MG/ML
25-50 INJECTION INTRAMUSCULAR; INTRAVENOUS
Status: DISCONTINUED | OUTPATIENT
Start: 2024-06-18 | End: 2024-06-18

## 2024-06-18 RX ORDER — ACYCLOVIR 200 MG/1
0-1 CAPSULE ORAL
Status: DISCONTINUED | OUTPATIENT
Start: 2024-06-18 | End: 2024-06-18

## 2024-06-18 RX ORDER — WARFARIN SODIUM 7.5 MG/1
7.5 TABLET ORAL ONCE
Status: COMPLETED | OUTPATIENT
Start: 2024-06-18 | End: 2024-06-18

## 2024-06-18 RX ORDER — IVABRADINE 5 MG/1
5-15 TABLET, FILM COATED ORAL
Status: DISCONTINUED | OUTPATIENT
Start: 2024-06-18 | End: 2024-06-18

## 2024-06-18 RX ORDER — METHYLPREDNISOLONE SODIUM SUCCINATE 125 MG/2ML
125 INJECTION, POWDER, LYOPHILIZED, FOR SOLUTION INTRAMUSCULAR; INTRAVENOUS
Status: DISCONTINUED | OUTPATIENT
Start: 2024-06-18 | End: 2024-06-18

## 2024-06-18 RX ORDER — SPIRONOLACTONE 25 MG/1
25 TABLET ORAL DAILY
Status: DISCONTINUED | OUTPATIENT
Start: 2024-06-18 | End: 2024-06-20 | Stop reason: HOSPADM

## 2024-06-18 RX ORDER — ONDANSETRON 2 MG/ML
4 INJECTION INTRAMUSCULAR; INTRAVENOUS
Status: DISCONTINUED | OUTPATIENT
Start: 2024-06-18 | End: 2024-06-18

## 2024-06-18 RX ORDER — METOPROLOL TARTRATE 1 MG/ML
5-15 INJECTION, SOLUTION INTRAVENOUS
Status: DISCONTINUED | OUTPATIENT
Start: 2024-06-18 | End: 2024-06-18

## 2024-06-18 RX ORDER — ENOXAPARIN SODIUM 150 MG/ML
1 INJECTION SUBCUTANEOUS EVERY 12 HOURS
Status: DISCONTINUED | OUTPATIENT
Start: 2024-06-18 | End: 2024-06-18

## 2024-06-18 RX ORDER — METOPROLOL TARTRATE 25 MG/1
25-100 TABLET, FILM COATED ORAL
Status: DISCONTINUED | OUTPATIENT
Start: 2024-06-18 | End: 2024-06-18

## 2024-06-18 RX ADMIN — SPIRONOLACTONE 25 MG: 25 TABLET ORAL at 12:05

## 2024-06-18 RX ADMIN — APIXABAN 5 MG: 5 TABLET, FILM COATED ORAL at 20:12

## 2024-06-18 RX ADMIN — IVABRADINE 10 MG: 5 TABLET, FILM COATED ORAL at 13:18

## 2024-06-18 RX ADMIN — BARIUM SULFATE 5 ML: 400 SUSPENSION ORAL at 10:53

## 2024-06-18 RX ADMIN — SACUBITRIL AND VALSARTAN 1 TABLET: 49; 51 TABLET, FILM COATED ORAL at 22:56

## 2024-06-18 RX ADMIN — FUROSEMIDE 60 MG: 10 INJECTION, SOLUTION INTRAMUSCULAR; INTRAVENOUS at 09:45

## 2024-06-18 RX ADMIN — WARFARIN SODIUM 7.5 MG: 7.5 TABLET ORAL at 16:39

## 2024-06-18 RX ADMIN — SACUBITRIL AND VALSARTAN 1 TABLET: 24; 26 TABLET, FILM COATED ORAL at 09:45

## 2024-06-18 RX ADMIN — METOPROLOL TARTRATE 50 MG: 50 TABLET, FILM COATED ORAL at 13:17

## 2024-06-18 RX ADMIN — CARVEDILOL 25 MG: 25 TABLET, FILM COATED ORAL at 18:49

## 2024-06-18 RX ADMIN — LEVETIRACETAM 1500 MG: 100 INJECTION, SOLUTION INTRAVENOUS at 18:49

## 2024-06-18 RX ADMIN — ATORVASTATIN CALCIUM 40 MG: 40 TABLET, FILM COATED ORAL at 20:12

## 2024-06-18 RX ADMIN — LEVETIRACETAM 1500 MG: 100 INJECTION, SOLUTION INTRAVENOUS at 06:47

## 2024-06-18 RX ADMIN — ENOXAPARIN SODIUM 135 MG: 150 INJECTION SUBCUTANEOUS at 09:46

## 2024-06-18 RX ADMIN — CARVEDILOL 12.5 MG: 12.5 TABLET, FILM COATED ORAL at 09:45

## 2024-06-18 RX ADMIN — FUROSEMIDE 60 MG: 10 INJECTION, SOLUTION INTRAMUSCULAR; INTRAVENOUS at 16:39

## 2024-06-18 ASSESSMENT — ACTIVITIES OF DAILY LIVING (ADL)
ADLS_ACUITY_SCORE: 27
ADLS_ACUITY_SCORE: 31
ADLS_ACUITY_SCORE: 27
ADLS_ACUITY_SCORE: 31
ADLS_ACUITY_SCORE: 27
ADLS_ACUITY_SCORE: 31
ADLS_ACUITY_SCORE: 27

## 2024-06-18 NOTE — PROGRESS NOTES
"   06/18/24 1200   Appointment Info   Signing Clinician's Name / Credentials (OT) Mary Bae, OTR/L   Rehab Comments (OT) re-evaluation, new stroke 6/17   Living Environment   Living Environment Comments Pt lives alone in house w/ 2 PRESTON, full flight to basement laundry. Bed/bath on main level. Tub shower.   Self-Care   Activity/Exercise/Self-Care Comment IND w/ all ADLs at baseline.   Instrumental Activities of Daily Living (IADL)   IADL Comments IND w/ IADLs at baseline, works w/ a hands on job, unable to describe futher today. Was driving PTA.   General Information   Onset of Illness/Injury or Date of Surgery 06/12/24   Referring Physician Cameron Frederick PA-C   Patient/Family Therapy Goal Statement (OT) continue therapy and return home   Additional Occupational Profile Info/Pertinent History of Current Problem Per chart review: \"I was called by Redd Saavedra on 06/17/24 regarding patient Cirilo Gardner. The patient is a 63 year old man admitted with subacute infarct of L temporal/occipital and L parasagittal frontal regions, petechial hemorrhage of temporal/occipital infarct possibly cardioembolic in etiology secondary to LV apical thrombus (EF of 15%) on Lovenox being bridged to Warfarin having acute onset of R sided facial droop, R sided weakness with R UE no movement and some movement in R LE, aphasic (was neuro intact per Stroke neuro notes from yesterday). LKW was 15:45  \"   General Observations and Info aphasia   Cognitive Status Examination   Orientation Status   (cues for 1 question at a time, oriented to person, place, situation; pt also does well w/ options.)   Affect/Mental Status (Cognitive)   (aphasia still, receptive aphasia still at times w/ full sentence instruction or multi step commands but generally understands verbal commands and can execute w/ demo)   Follows Commands follows one-step commands;75-90% accuracy;delayed response/completion;increased processing time needed "   Attention Deficit   (pt works really hard to stay focused during therapy sessions, repetition of directions occasionally)   Executive Function Deficit abstract thinking;judgment;organization/sequencing;planning/decision-making;self-monitoring/self-correction   Cognitive Status Comments Expressive aphasia, able to say full sentences occasionally, defer to SLP for formal speech evaluation;   Cognitive Screens/Assessments   Cognitive Assessments Completed [place]   [place] Cognitive Screen-Scoring  The [place] Cognitive Screen scores each individual task.  There is not a calculated overall total score.  For information on patient performance, please see the individual task performance and interpretation by the therapist   [place] Norms Scores on subtests/tasks are noted individually below.  The scoring parameters will help indicate an impairment.  (scores lower than 5th centile and higher than 95th centile)   [place] Domains Assessed A screen for memory, language, numbers, praxis, executive function and attention to help determine if further assessment in any cognitive domain is needed   [place] Naming (4 points)    (0/4 spontaneously but when provided 2 options pt could name all 4 accurately)   [place] Semantics (3 points) 3   [place] Orientation (4 points) 3   [place] Sentence Reading (15 points) 0/15 spontaneously but when read aloud and pt repeating after therapist pt had 13/15 words repeated accurately.   [place] Calculation (4 points) 3   [place] Spatial Attention (max 50 points) 50   [place] Memory: Verbal Recall & Recognition (4 points) 4   [place] Memory: Episodic Recognition  (4 points) 3   [place] Interpretation Pt engaged in parts of [place] cognitive screen on 6/15/24 skipping the areas that were not scored above. Pt scoring well when provided options. 100% accuracy on broken hearts task and word recall w/ visual options presented. Pt still w/ aphasia and poor spontaneous word formation. See SLP note this date for  additional details on speech/language deficits, memory is intact.   Visual Perception   Impact of Vision Impairment on Function (Vision) vision appears to be improving from time of admission, R field cut is very minor, still bumps into doorways on occasion   Sensory   Sensory Comments intact per pt report and screen   Range of Motion Comprehensive   Comment, General Range of Motion PROM intact, AROM of R wrist weaker, unable to extend at wrist   Strength Comprehensive (MMT)   General Manual Muscle Testing (MMT) Assessment hand strength deficits identified   Comment, General Manual Muscle Testing (MMT) Assessment (S)  significant R  strength deficits noted changed from yesterday   Hand Strength   Right hand  (pounds) (S)  15#   Left hand  (pounds) (S)  60#   Coordination   Upper Extremity Coordination Right UE impaired   Gross Motor Coordination RUE coordination deficits   Fine Motor Coordination R hand and coord deficits   Functional Limitations Decreased speed;Fine motor ADL performance impaired;Impaired ability to perform bilateral tasks;Object transport impaired;Reach to targets impaired   Coordination Comments unable to perform opposition, unable to extend wrist impairing function, provided built up handle for feeding see details below   Bed Mobility   Comment (Bed Mobility) SBA but R hand slipping off bedrail   Transfers   Transfer Comments CGA   Sit-Stand Transfer   Sit-Stand [place] (Transfers) contact guard   Lower Body Dressing Assessment/Training   Comment, (Lower Body Dressing) R hand deficits impacting ind   [place] Level (Lower Body Dressing) moderate assist (50% patient effort)   Grooming Assessment/Training   Comment, (Grooming) [place] A, built up handle, compensatory using L hand   Toileting   Comment, (Toileting) stood at toilet for urination able to manage   Clinical Impression   Criteria for Skilled Therapeutic Interventions Met (OT) Yes, treatment indicated   OT Diagnosis  impaired /FMC and ind w/ ADLs   OT Problem List-Impairments impacting ADL problems related to;activity tolerance impaired;cognition   Assessment of Occupational Performance 3-5 Performance Deficits   Identified Performance Deficits Home mgmt, sequencing ADL tasks, high level cog, FMC and BADLs   Planned Therapy Interventions (OT) ADL retraining;IADL retraining;cognition;home program guidelines;progressive activity/exercise;risk factor education;neuromuscular re-education;fine motor coordination training   Clinical Decision Making Complexity (OT) detailed assessment/moderate complexity  (re-evaluation)   OT Total Evaluation Time   OT Eval, Moderate Complexity Minutes (57356) 9   OT Goals   Therapy Frequency (OT) Daily   OT Predicted Duration/Target Date for Goal Attainment 06/25/24   OT Goals OT Goal 1   OT: Hygiene/Grooming independent;while standing   OT: Lower Body Dressing Independent;including set-up/clothing retrieval   OT: Toilet Transfer/Toileting Independent;toilet transfer;cleaning and garment management   OT: Cognitive Patient/caregiver will verbalize understanding of cognitive assessment results/recommendations as needed for safe discharge planning   OT: Goal 1 Pt will engage in RUE NMR for increased ind w/ BADLs.   Interventions   Interventions Quick Adds Neuromuscular Re-education   Self-Care/Home Management   Self-Care/Home Mgmt/ADL, Compensatory, Meal Prep Minutes (34566) 11   Treatment Detail/Skilled Intervention OT: CGA supine > sit in bed, CGA- close SBA steps to chair, CGA amb in room, SOB noted after activity took 2-3 min for O2 for o2 to go from 88% to 96% on RA; brushed teeth used R hand for stabilizing toothbrush while putting toothpaste on, attempted to start w/ R hand but took over task w/ LUE for thoroughness d/t new onset weakness. Provided built up handle on fork at end of session, [place] A to get good  on fork but then able to demonstrate several bites of pasta w/ fork using R hand.  uncoordated and wrist flexion noted.   Neuromuscular Re-Education   Neuromuscular Re-Education Minutes (00801) 24   Treatment Detail/Skilled Intervention OT: Pt engaged in NMR and  testing, significant R  weakness noted (R: 15#, L: 60#), provided pink putty and reviewed x4 exercises, needs increased time for cues, demo and repeated instruction d/t aphasia. Pt educated on change of function w/ new stroke, pt verbalized understanding but needing modified education in short simple sentences.AAROM mass digital flexion/extension, wrist flexion/extension.   OT Discharge Planning   OT Plan R  (15# vs 60#), putty, block, large pegs, FMC, doffing/donning socks, R wrist extension and NMR   OT Discharge Recommendation (DC Rec) [facility]-Motivated patient will benefit from intensive, interdisciplinary therapy.  Anticipate will be able to tolerate 3 hours of therapy per day;Transitional Care Facility   OT Rationale for DC Rec pt presenting with deficits in activity tolerance and cognition, vision deficits. pt not able to return to home alone. rec TCU vs ARU. If pt goes home would need 24h assist w/ IADLs and 24/7 supervision. Pt w/ new stroke affecting R  and coordination; although pt does not have excellent family support at discharge would be ARU candidate and benefit from 3hrs of therapy daily.   OT Brief overview of current status new stroke affecting R side, re-eval   Total Session Time   Timed Code Treatment Minutes 35   Total Session Time (sum of timed and untimed services) 44

## 2024-06-18 NOTE — PROGRESS NOTES
Neurology note:    MRI brain was reviewed and it shows new small left anterior border zone infarct along the posterior margin of prior infarct.    Plan:     - Neurochecks and Vital Signs every 4hrs  - Inpatient BP goal 120-160   - Continue coumadin with lovenox bridge until therapeutic.  - Stroke team to evaluate tomorrow.    AZEB Ayala  Vascular Neurology fellow

## 2024-06-18 NOTE — PROGRESS NOTES
Agree with medical student's note and have addended as needed.    AZEB Cobb  Vascular Neurology Fellow          [facility]    Stroke Progress Note    Interval Events  Cirilo was doing well and ready to discharge yesterday at 1000. At 1644 a nurse noted new right facial droop as well as profound right sided weakness and garbled speech, prompting her to call an RRT. NIHSS was 15. Stoke code was deescalated after CT/CTA did not show any acute pathology. He was loaded with Keppra.      HPI Summary  Cirilo Gardner is a 63 year old male who has not seen a doctor in many years, not on any medication PTA. He was brought to the ED 6/12 after he was on the phone with someone who noted him to be confused and notified EMS. In the ED he was noted to have mixed expressive/receptive language deficits and R visual field cut. On further questioning of the patient he was able to provide history that he woke up around 0300 feeling ok, around 0800 he felt that something wasn't right and by 1000 something was definitely not right. TNK not given due to presentation outside the conventional treatment window. No thrombectomy given absence of proximal vessel occlusion. MRI with L temporal/occipital and frontal infarcts. LVEF 15-20% with cardiac thrombus. He has been started on Coumadin and is being bridged on Lovenox. Stoke code was activated at 6/17 for acute onset right upper and lower extremity weakness and worsening aphasia.    Stroke Evaluation Summarized    MRI/Head CT CT 6/12: No acute intercranial hemorrhage  CT 6/14: evolving L temporal/occipital infarct with small areas of petechial hemorrhage, small amount SAH in L temporal/occipital region, mild mass effect on posterior L lateral ventricle without hydrocephalus or entrapment   CT 6/17: Evolving subacute left anterior and posterior border zone infarcts with redemonstration of left frontoparietal petechial hemorrhage. Trace left temporal  subarachnoid hemorrhage. No maged hematoma, progressive mass effect or convincing new territory of involvement.     MRI 6/14: subacute infarct of L temporal/occipital and L parasagittal frontal regions, petechial hemorrhage of temporal/occipital infarct (only DWI and sagittal T1 sequences completed)  MRI 6/17: New small left anterior border zone infarct along the posterior margin of prior infarct compared to 6/14/2024. Similar distribution of the remaining subacute infarcts with petechial hemorrhage in the left posterior border zone. Slight progressed local mass effect without significant midline shift or maged hematoma formation.   Intracranial Vasculature CTA 6/12: reduced arborization inferior L MCA M3, no clear LVO  CTA 6/17: Status quo as 6/12 CTA   Cervical Vasculature CTA 6/12: Lack flow L vertebral artery, age indeterminate,  R ICA short segment <50% narrowing, Soft plaque L ICA with mild stenosis   CTA 6/17: No significant change since 6/12/2024. Unchanged occluded left vertebral artery from the origin to the distal V4 segment. The remaining major vessels of the head and neck are patent without flow-limiting stenosis.    Carotid US 6/14:  1. Less than 50% diameter stenosis of the right ICA relative to the  distal ICA diameter.   2. Less than 50% diameter stenosis of the left ICA relative to the  distal ICA diameter.   3. Occlusion of the left vertebral artery, unchanged when compared to  the prior CT from 6/12/2024.             Echocardiogram LVEF 15-20%, mild to moderate concentric LVH, severe global hypokinesia  of LV, apical L ventricular thrombus 1.5x1.9cm, moderate biatrial enlargement    EKG/Telemetry Sinus rhythm   Other Testing EEG: Pending     LDL  6/13/2024: 82 mg/dL   A1C  6/12/2024: 6.4 %   Troponin 6/18/2024: 72 ng/L       Impression   Transient acute worsening of pre-existing aphasia and new transient  right sided weakness -new stroke 6/17 in small left anterior border zone infarct along the  "posterior margin of prior (6/12) infarct could be likely evolution/completion of known strokes. Concern is stil high for possible seizure/Todds-palsy from cortical stroke  History of recent acute L frontal, temporal and occipital infarcts due to cardioembolism 6/12  Apical thrombus, LVEF 15-20%, severe global hypokinesia - being bridged with Lovenox and initiated on Warfarin  L ICA soft plaque -not felt to be symptomatic   L vert occlusion, unknown chronicity      Plan  - Keppra maintenance of 1.5 g BID (Loaded with 4.5 g on 6/17)  - Neuro checks q 4 hours  - routine EEG x 2 hours  - repeat ECHO  - Continue Lovenox and Warfarin. Discussed with Pharmacy re: weight change with diuresis and need to change Lovenox dose accordingly.     Patient Follow-up    - final recommendation pending work-up    We will continue to follow.       The Stroke Fellow is Dr. Glover. The Stroke Staff is Dr. Keenan.    Bertin Saba  Medical Student  To page a member of the stroke/neurocritical care service, click here:  [facility]   Choose \"On Call\" tab at top, then search dropdown box for \"Neurology Adult\", select location, press Enter, then look for stroke/neuro ICU/telestroke.  ______________________________________________________    Clinically Significant Risk Factors        # Hypokalemia: Lowest K = 3.3 mmol/L in last 2 days, will replace as needed            # Acute heart failure with reduced ejection fraction: last echo with EF <40% and receiving IV diuretics             # Obesity: Estimated body mass index is 35.1 kg/m  as calculated from the following:    Height as of this encounter: 1.829 m (6').    Weight as of this encounter: 117.4 kg (258 lb 12.8 oz).               Medications   Scheduled Meds  Current Facility-Administered Medications   Medication Dose Route Frequency Provider Last Rate Last Admin    atorvastatin (LIPITOR) tablet 40 mg  40 mg Oral QPM More, Radha C, PA-C   40 mg at 06/16/24 1949    carvedilol (COREG) tablet 25 " mg  25 mg Oral BID w/meals Ashley Yao PA-C        empagliflozin (JARDIANCE) tablet 10 mg  10 mg Oral Daily Margarita Marvin CNP   10 mg at 06/17/24 0837    enoxaparin ANTICOAGULANT (LOVENOX) injection 120 mg  1 mg/kg Subcutaneous Q12H Luciano Gallagher MD        furosemide (LASIX) injection 60 mg  60 mg Intravenous BID Ashley Yao PA-C   60 mg at 06/18/24 0945    insulin aspart (NovoLOG) injection (RAPID ACTING)  1-7 Units Subcutaneous TID AC Louisa Townsend DO        insulin aspart (NovoLOG) injection (RAPID ACTING)  1-5 Units Subcutaneous At Bedtime Louisa Townsend DO        levETIRAcetam (KEPPRA) 1,500 mg in sodium chloride 0.9 % 125 mL intermittent infusion  1,500 mg Intravenous Q12H Trista Glover  mL/hr at 06/18/24 0647 1,500 mg at 06/18/24 0647    sacubitril-valsartan (ENTRESTO) 49-51 MG per tablet 1 tablet  1 tablet Oral BID Ashley Yao PA-C        sodium chloride (PF) 0.9% PF flush 3 mL  3 mL Intracatheter Q8H Cameron Frederick PA-C   3 mL at 06/18/24 0947    spironolactone (ALDACTONE) tablet 25 mg  25 mg Oral Daily Ashley Yao PA-C   25 mg at 06/18/24 1205    warfarin ANTICOAGULANT (COUMADIN) tablet 7.5 mg  7.5 mg Oral Once Redd Saavedra MD        Warfarin Dose Required Daily - Pharmacist Managed  1 each Oral See Admin Instructions Steven Barraza MD           Infusion Meds  Current Facility-Administered Medications   Medication Dose Route Frequency Provider Last Rate Last Admin    medication instruction - No oral meds if patient didn't pass dysphagia screen   Does not apply Continuous PRN Cameron Frederick PA-C        Medication Instructions - Avoid dextrose in IV solutions.   Intravenous Continuous PRN Cameron Frederick PA-C           PRN Meds  Current Facility-Administered Medications   Medication Dose Route Frequency Provider Last Rate Last Admin    acetaminophen (TYLENOL) Suppository 650 mg  650 mg Rectal Q4H PRN  Cameron Frederick PA-C        glucose gel 15-30 g  15-30 g Oral Q15 Min PRN Cameron Frederick PA-C        Or    dextrose 50 % injection 25-50 mL  25-50 mL Intravenous Q15 Min PRN Cameron Frederick PA-C        Or    glucagon injection 1 mg  1 mg Subcutaneous Q15 Min PRN Cameron Frederick PA-C        labetalol (NORMODYNE/TRANDATE) injection 10-20 mg  10-20 mg Intravenous Q10 Min PRN Cameron Frederick PA-C        Or    hydrALAZINE (APRESOLINE) injection 10-20 mg  10-20 mg Intravenous Q1H PRN Cameron Frederick PA-C        IF patient diabetic - HOLD: ALL ORAL HYPOGLYCEMICS: glipizide, glyburide, glimepiride, gliclazide, metformin (Glucophage), any metformin (Glucophage) containing medication, rosiglitazone (Avandia), pioglitazone (Actos), or sitagliptin (Januvia) on day of the procedure   Does not apply HOLD Steven Barraza MD        ipratropium - albuterol 0.5 mg/2.5 mg/3 mL (DUONEB) neb solution 3 mL  3 mL Nebulization Q4H PRN Luciano Gallagher MD        lidocaine (LMX4) cream   Topical Q1H PRN Cameron Frederick PA-C        lidocaine 1 % 0.1-1 mL  0.1-1 mL Other Q1H PRN Cameron Frederick PA-C        medication instruction - No oral meds if patient didn't pass dysphagia screen   Does not apply Continuous PRN Cameron Frederick PA-C        Medication Instructions - Avoid dextrose in IV solutions.   Intravenous Continuous PRN Cameron Frederick PA-C        ondansetron (ZOFRAN ODT) ODT tab 4 mg  4 mg Oral Q6H PRN Cameron Frederick PA-C        Or    ondansetron (ZOFRAN) injection 4 mg  4 mg Intravenous Q6H PRN Cameron Frederick PA-C        sodium chloride (PF) 0.9% PF flush 10 mL  10 mL Intravenous Q10 Min PRN Steven Barraza MD        sodium chloride (PF) 0.9% PF flush 3 mL  3 mL Intracatheter q1 min prn Cameron Frederick PA-C   3 mL at 06/13/24 0020          PHYSICAL EXAMINATION  Temp:  [97.9  F (36.6  C)-99.1  F  "(37.3  C)] 98  F (36.7  C)  Pulse:  [66-94] 73  Resp:  [16-20] 20  BP: (126-193)/() 126/83  SpO2:  [92 %-97 %] 96 %      Neuro:   Awake, alert, sitting up in his chair, mild R facial droop, says yes/no appropriately, follows simple commands almost consistently, unable to name, repetition intact  R UE no drift - grade 5/5 strength throughout except for Grade 4/5 strength in the distal muscles  RLE, LUE and LLE - grade 5/5 strength in proximal and distal muscles  Sensation intact b/lly  R hemianopsia +    Imaging  I personally reviewed all imaging; relevant findings per HPI.     Lab Results Data   CBC  Recent Labs   Lab 06/17/24 1808 06/16/24  0731 06/15/24  0612   WBC 8.7 8.2 8.6   RBC 5.37 4.89 4.65   HGB 17.0 15.3 14.2   HCT 50.2 47.2 44.5    310 281     Basic Metabolic Panel    Recent Labs   Lab 06/18/24  0803 06/18/24  0755 06/17/24  2117 06/17/24  1808 06/17/24  1129 06/17/24  1026   NA  --  142  --  140  --  143   POTASSIUM  --  3.5  --  3.5  --  3.4   CHLORIDE  --  105  --  100  --  104   CO2  --  24  --  24  --  27   BUN  --  17.9  --  24.4*  --  24.3*   CR  --  0.94  --  1.18*  --  1.15   GLC 99 96 115* 126*   < > 121*   PARISH  --  9.0  --  9.5  --  9.4    < > = values in this interval not displayed.     Liver Panel  No results for input(s): \"PROTTOTAL\", \"ALBUMIN\", \"BILITOTAL\", \"ALKPHOS\", \"AST\", \"ALT\", \"BILIDIRECT\" in the last 168 hours.  INR    Recent Labs   Lab Test 06/18/24  0755 06/17/24  1808 06/17/24  1026   INR 1.28* 1.19* 1.20*      Lipid Profile    Recent Labs   Lab Test 06/13/24  0742   CHOL 135   HDL 41   LDL 82   TRIG 58     A1C    Recent Labs   Lab Test 06/12/24  1408   A1C 6.4*     Troponin    Recent Labs   Lab 06/18/24  0755 06/17/24  1808 06/12/24  2226   CTROPT 72* 80* 83*          Data       "

## 2024-06-18 NOTE — PROGRESS NOTES
"[facility]    Hospitalist Progress Note    Interval History   - RRT yesterday afternoon with MRI showing extension of stroke, also concern for Felipe's Palsy  - Patient's aphasia appears similar possibly slightly worse today  - Reassessment by PT  - Lawsoning per cards  - Friend Jo-Ann called and not answered today    Assessment & Plan   Summary: Cirilo Gardner is a 63 year old male with no significant PMH who was admitted on 6/12/2024 with acute posterior left MCA infarction with symptoms of mixed expressive/receptive aphasia and right visual field cut. Found to have systolic heart failure and apical thombus likely etiology of stroke.    Acute ischemic stroke of posterior left MCA presumed 2/2 LV thrombus  Extension of CVA noted 6/17 versus possible Felipe's Palsy  Petechial hemorrhage on MRI  Mixed expressive/receptive aphasia  Right visual field cut  Brought to ED with confusion, patient reported was \"definitely not right\" at 1000, client called 911 at 1400. In the ED, was afebrile, tachycardic and hypertensive with SBPs 170s. Was noted to have expressive/receptive deficits and right visual field cut. Code stroke was called. Labs notable for WBC 11.4, lytes/Cr stable, ,  proBNP 12k, trops elevated (57 -- 83). EKS showed ST with PVCs, possible LAE; no ST/T changes. Head CT neg. CTA head/neck showed an age-indeterminate lack of flow in the left vertebral artery of uncertain etiology and mild atherosclerotic narrowing  at the R ICA origin but was otherwise nl. CT perfusion study showed delayed contrast arrival in the area of the posterolateral aspect of the left cerebral hemisphere involving portions of the left temporal and left occipital lobes; findings suggestive of infarct.   A1c 6.4, FLP with tot cholest 135, HDL 41, LDL 82.   Seen by stroke team. Felt to be out of the window for thrombolytics. Given ASA and Plavix load. Echo obtained and showed findings of an apical LV " thrombus.  Findings were discussed with stroke service.  Aspirin and Plavix were stopped and he was placed on a low intensity heparin drip on 6/13 PM.  MRI brain 6/14 showed a moderate-sized evolving infarct in the temporal/occipital lobes with petechial hemorrhage and a similar zone of evolving infarct in the parasagittal left frontal lobe.  Stroke neurology service was made aware of these findings given recent initiation of anticoagulation.  Was continued on heparin drip.   Bedside sitter ordered on 6/14 AM after patient attempted to leave AMA x2. Less confused, less aphasic on 6/15, stop bedside sitter. Patient transitioned to warfarin + Lovenox.  - Appreciate Neurology consultation   - Keppra started for possible Felipe's palsy 6/17  - Lovenox + warfarin  - Cont atorvastatin 40mg daily  - Tighten BP control, goal -160  - PT/OT, on modified diet per SLP    Acute LV thrombus  Newly diagnosed systolic CHF, chronicity unclear  NSTEMI, suspect type II dt demand ischemia in setting of CVA  Pulmonary hypertension  No known hx of CAD, CHF. On admission this stay, noted to have bilateral LE edema, patient mentioned had been there for several days though unclear in regards to the specifics. Trop trend as above. ProBNP significant elevated.  EKG nonischemic. No reports of chest pain.  *Echo obtained this stay and showed EF 15-20% with severe global hypokinesia of the LV and an apical LV thrombus. LV apex clot 1.5x1.9cm, non-mobile. Also noted to have findings of biatrial enlargement, mod decreased RVSF and mod pulmonary hypertension.   - Appreciate Cardiology consult   - ischemic workup likely outpatient   - IV diuresis   - Entresto started  - Anticoagulation as above  - Increase Coreg 6.25mg BID    Prediabetes  A1c 6.4 this stay.   - using sliding scale insulin while hospitalized  - follow up with PCP after discharge for ongoing BG mgmt    Clinically Significant Risk Factors        # Hypokalemia: Lowest K = 3.3  mmol/L in last 2 days, will replace as needed            # Acute heart failure with reduced ejection fraction: last echo with EF <40% and receiving IV diuretics             # Obesity: Estimated body mass index is 35.1 kg/m  as calculated from the following:    Height as of this encounter: 1.829 m (6').    Weight as of this encounter: 117.4 kg (258 lb 12.8 oz).              PT/OT: ordered  Diet: Room Service  No Caffeine Diet    DVT Prophylaxis: Lovenox + warfarin  Ravi Catheter: Not present  Lines: None     Cardiac Monitoring: ACTIVE order. Indication: Stroke, acute (48 hours)  Code Status: Full Code    Medically Ready for Discharge: Anticipated Tomorrow or 2 days pending Cardiology and Neurology sign off      Luciano Gallagher MD  Hospitalist Service  [facility]  Securely message with [employer] (more info)  Text page via [employer] Paging/Directory       Data reviewed today: I reviewed all new labs and imaging results over the last 24 hours.    Physical Exam   Temp: 97.9  F (36.6  C) Temp src: Axillary BP: (!) 147/91 Pulse: 77   Resp: 20 SpO2: 92 % O2 Device: None (Room air)    Vitals:    06/16/24 1354 06/17/24 0906 06/18/24 0759   Weight: 125.1 kg (275 lb 14.4 oz) 123.8 kg (273 lb) 117.4 kg (258 lb 12.8 oz)     Vital Signs with Ranges  Temp:  [96.6  F (35.9  C)-99.1  F (37.3  C)] 97.9  F (36.6  C)  Pulse:  [66-94] 77  Resp:  [16-20] 20  BP: (140-193)/() 147/91  SpO2:  [90 %-97 %] 92 %  I/O last 3 completed shifts:  In: 365 [P.O.:240; I.V.:125]  Out: 7000 [Urine:7000]  O2 requirements: none    Constitutional: Obese male in NAD  HEENT: Eyes nonicteric, oral mucosa moist  Cardiovascular: RRR, normal S1/2, no m/r/g  Respiratory: Basilar crackles  Vascular: 2-3+ BLE pitting edema  GI: Normoactive bowel sounds, nontender  Skin/Integumen: No rashes  Neuro/Psych: Appropriate affect and mood. A&Ox3, expressive aphasia, moves all extremities    Medications   Current Facility-Administered  Medications   Medication Dose Route Frequency Provider Last Rate Last Admin    medication instruction - No oral meds if patient didn't pass dysphagia screen   Does not apply Continuous PRN Cameron Frederick PA-C        Medication Instructions - Avoid dextrose in IV solutions.   Intravenous Continuous PRN Cameron Frederick PA-C         Current Facility-Administered Medications   Medication Dose Route Frequency Provider Last Rate Last Admin    atorvastatin (LIPITOR) tablet 40 mg  40 mg Oral QPM Radha Haney PA-C   40 mg at 06/16/24 1949    carvedilol (COREG) tablet 25 mg  25 mg Oral BID w/meals Ashley Yao PA-C        empagliflozin (JARDIANCE) tablet 10 mg  10 mg Oral Daily Margarita Marvin CNP   10 mg at 06/17/24 0837    enoxaparin ANTICOAGULANT (LOVENOX) injection 135 mg  1 mg/kg Subcutaneous Q12H Luciano Gallagher MD   135 mg at 06/18/24 0946    furosemide (LASIX) injection 60 mg  60 mg Intravenous BID Ashley Yao PA-C   60 mg at 06/18/24 0945    insulin aspart (NovoLOG) injection (RAPID ACTING)  1-7 Units Subcutaneous TID AC Louisa Townsend DO        insulin aspart (NovoLOG) injection (RAPID ACTING)  1-5 Units Subcutaneous At Bedtime Louisa Townsend DO        levETIRAcetam (KEPPRA) 1,500 mg in sodium chloride 0.9 % 125 mL intermittent infusion  1,500 mg Intravenous Q12H Trista Glover  mL/hr at 06/18/24 0647 1,500 mg at 06/18/24 0647    sacubitril-valsartan (ENTRESTO) 49-51 MG per tablet 1 tablet  1 tablet Oral BID Ashley Yao PA-C        sodium chloride (PF) 0.9% PF flush 3 mL  3 mL Intracatheter Q8H Cameron Frederick PA-C   3 mL at 06/18/24 0947    spironolactone (ALDACTONE) tablet 25 mg  25 mg Oral Daily Ashley Yao PA-C        Warfarin Dose Required Daily - Pharmacist Managed  1 each Oral See Admin Instructions Steven Barraza MD           Data   Recent Labs   Lab 06/18/24  0803 06/18/24  0755 06/17/24  2117 06/17/24  1807  06/17/24  1129 06/17/24  1026 06/16/24  0757 06/16/24  0731 06/15/24  0806 06/15/24  0612   WBC  --   --   --  8.7  --   --   --  8.2  --  8.6   HGB  --   --   --  17.0  --   --   --  15.3  --  14.2   MCV  --   --   --  94  --   --   --  97  --  96   PLT  --   --   --  362  --   --   --  310  --  281   INR  --  1.28*  --  1.19*  --  1.20*  --  1.14   < >  --    NA  --  142  --  140  --  143   < >  --   --  139   POTASSIUM  --  3.5  --  3.5  --  3.4   < >  --   --  3.4   CHLORIDE  --  105  --  100  --  104   < >  --   --  102   CO2  --  24  --  24  --  27   < >  --   --  24   BUN  --  17.9  --  24.4*  --  24.3*   < >  --   --  30.1*   CR  --  0.94  --  1.18*  --  1.15   < >  --   --  1.07   ANIONGAP  --  13  --  16*  --  12   < >  --   --  13   PARISH  --  9.0  --  9.5  --  9.4   < >  --   --  8.9   GLC 99 96 115* 126*   < > 121*   < >  --    < > 108*    < > = values in this interval not displayed.       Imaging:   Recent Results (from the past 24 hour(s))   CT Head w/o Contrast    Narrative    EXAM: CT HEAD W/O CONTRAST, CTA HEAD NECK W CONTRAST  LOCATION: [facility] [place]  DATE/TIME: 6/17/2024 5:18 PM CDT    INDICATION: Code Stroke; right-sided weakness, facial droop, expressive aphasia  COMPARISON:  MRI brain 6/14/2024, CTA head and neck 6/12/2024.  CONTRAST: 67 mL Isovue 370  TECHNIQUE: Head and neck CT angiogram with IV contrast. Noncontrast head CT followed by axial helical CT images of the head and neck vessels obtained during the arterial phase of intravenous contrast administration. Axial 2D reconstructed images and   multiplanar 3D MIP reconstructed images of the head and neck vessels were performed by the technologist. Dose reduction techniques were used. All stenosis measurements made according to NASCET criteria unless otherwise specified.    FINDINGS:   NONCONTRAST HEAD CT:   INTRACRANIAL CONTENTS: Evolving subacute left anterior and posterior border zone infarcts with redemonstration  of left temporal petechial hemorrhage. Trace left temporal subarachnoid hemorrhage. No maged hematoma formation. Mild local mass effect without   significant midline shift. No hydrocephalus. No new territory of involvement detected.    VISUALIZED ORBITS/SINUSES/MASTOIDS: No intraorbital abnormality.  Left maxillary sinus mucosal disease. No middle ear or mastoid effusion.    BONES/SOFT TISSUES: No acute abnormality.    HEAD CTA:  ANTERIOR CIRCULATION: No stenosis/occlusion, aneurysm, or high flow vascular malformation. Standard [place] of [place] anatomy.    POSTERIOR CIRCULATION: Redemonstration of left vertebral artery occlusion with minimal retrograde filling of the distal V4. No aneurysm or high flow vascular malformation.    DURAL VENOUS SINUSES: Not well evaluated on a technical basis.    NECK CTA:  RIGHT CAROTID: No measurable stenosis or dissection.    LEFT CAROTID: Soft and calcified atherosclerotic plaque results in less than 25% stenosis in the proximal ICA. No dissection.    VERTEBRAL ARTERIES: No focal stenosis or dissection on the right. Unchanged left vertebral artery occlusion.    AORTIC ARCH: Bovine origin left common carotid artery. No significant stenosis at the origin of the great vessels.    NONVASCULAR STRUCTURES:  Right pleural effusion.      Impression    IMPRESSION:   HEAD CT:  1.  Evolving subacute left anterior and posterior border zone infarcts with redemonstration of left frontoparietal petechial hemorrhage.  2.  Trace left temporal subarachnoid hemorrhage.  3.  No maged hematoma, progressive mass effect or convincing new territory of involvement.    HEAD AND NECK CTA:   No significant change since 6/12/2024.  1.  Unchanged occluded left vertebral artery from the origin to the distal V4 segment.  2.  The remaining major vessels of the head and neck are patent without flow-limiting stenosis.    Dr. Glover was contacted by me on 6/17/2024 5:36 PM CDT with the preliminary report.   CTA Head  Neck with Contrast    Narrative    EXAM: CT HEAD W/O CONTRAST, CTA HEAD NECK W CONTRAST  LOCATION: [facility]  DATE/TIME: 6/17/2024 5:18 PM CDT    INDICATION: Code Stroke; right-sided weakness, facial droop, expressive aphasia  COMPARISON:  MRI brain 6/14/2024, CTA head and neck 6/12/2024.  CONTRAST: 67 mL Isovue 370  TECHNIQUE: Head and neck CT angiogram with IV contrast. Noncontrast head CT followed by axial helical CT images of the head and neck vessels obtained during the arterial phase of intravenous contrast administration. Axial 2D reconstructed images and   multiplanar 3D MIP reconstructed images of the head and neck vessels were performed by the technologist. Dose reduction techniques were used. All stenosis measurements made according to NASCET criteria unless otherwise specified.    FINDINGS:   NONCONTRAST HEAD CT:   INTRACRANIAL CONTENTS: Evolving subacute left anterior and posterior border zone infarcts with redemonstration of left temporal petechial hemorrhage. Trace left temporal subarachnoid hemorrhage. No maged hematoma formation. Mild local mass effect without   significant midline shift. No hydrocephalus. No new territory of involvement detected.    VISUALIZED ORBITS/SINUSES/MASTOIDS: No intraorbital abnormality.  Left maxillary sinus mucosal disease. No middle ear or mastoid effusion.    BONES/SOFT TISSUES: No acute abnormality.    HEAD CTA:  ANTERIOR CIRCULATION: No stenosis/occlusion, aneurysm, or high flow vascular malformation. Standard [place] of [place] anatomy.    POSTERIOR CIRCULATION: Redemonstration of left vertebral artery occlusion with minimal retrograde filling of the distal V4. No aneurysm or high flow vascular malformation.    DURAL VENOUS SINUSES: Not well evaluated on a technical basis.    NECK CTA:  RIGHT CAROTID: No measurable stenosis or dissection.    LEFT CAROTID: Soft and calcified atherosclerotic plaque results in less than 25% stenosis in the  proximal ICA. No dissection.    VERTEBRAL ARTERIES: No focal stenosis or dissection on the right. Unchanged left vertebral artery occlusion.    AORTIC ARCH: Bovine origin left common carotid artery. No significant stenosis at the origin of the great vessels.    NONVASCULAR STRUCTURES:  Right pleural effusion.      Impression    IMPRESSION:   HEAD CT:  1.  Evolving subacute left anterior and posterior border zone infarcts with redemonstration of left frontoparietal petechial hemorrhage.  2.  Trace left temporal subarachnoid hemorrhage.  3.  No maged hematoma, progressive mass effect or convincing new territory of involvement.    HEAD AND NECK CTA:   No significant change since 6/12/2024.  1.  Unchanged occluded left vertebral artery from the origin to the distal V4 segment.  2.  The remaining major vessels of the head and neck are patent without flow-limiting stenosis.    Dr. Glover was contacted by me on 6/17/2024 5:36 PM CDT with the preliminary report.   CT Head Perfusion w Contrast    Narrative    EXAM: CT HEAD PERFUSION W CONTRAST  LOCATION: [facility]  DATE: 6/17/2024    INDICATION: Code Stroke; right-sided weakness, facial droop, expressive aphasia  COMPARISON: Same day CTs, MRI brain 6/14/2024, CTA head and neck 6/12/2024.  TECHNIQUE: CT cerebral perfusion was performed utilizing a second contrast bolus. Perfusion data were post processed with generation of standard perfusion maps and estimation of ischemic/infarcted volumes utilizing standard threshold values. Dose   reduction techniques were used.   CONTRAST: 50 mL Isovue 370    FINDINGS:   PERFUSION MAPS:  Examination degraded by motion. Within this limitation, no convincing new focal deficits in cerebral blood flow or volume to suggest ischemia/oligemia.    RAPID ANALYSIS:  CBF<30% volume: 0 mL  Tmax>6sec: 0 mL  Mismatch volume: 0 mL  Mismatch ratio: None      Impression    IMPRESSION:   Examination severely degraded by  motion.  1.  No convincing new territory of involvement, as imaged.    Dr. Glover was contacted by me on 6/17/2024 5:36 PM CDT with the preliminary report.   MR Brain w/o & w Contrast    Narrative    EXAM: MR BRAIN W/O and W CONTRAST  LOCATION: [facility]  DATE: 6/17/2024    INDICATION: NEw right sided weakness and aphasia in patient with LV thrombus previously neuro intact with Left MCA stroke.  COMPARISON:  Same day CTs, brain MRI 6/14/2024.  CONTRAST: 12 mL Gadavist  TECHNIQUE: Routine multiplanar multisequence head MRI without and with intravenous contrast.    FINDINGS:  INTRACRANIAL CONTENTS:  New small area of acute infarct in the left superior and middle frontal gyri (anterior border zone) compared to 6/14/2024. Remaining area of infarct has not significantly changed. Interval progression of local mass effect in the   subacute left anterior and posterior border zone infarcts with similar appearance of petechial hemorrhage. No maged hematoma. No significant midline shift. No hydrocephalus. Normal position of the cerebellar tonsils.  The area of subacute infarct   demonstrates reactive enhancement as well as intrinsic T1 shortening.    SELLA: No abnormality accounting for technique.    OSSEOUS STRUCTURES/SOFT TISSUES: Normal marrow signal. The major intracranial vascular flow voids are maintained.     ORBITS: No abnormality accounting for technique.     SINUSES/MASTOIDS: Mild mucosal thickening scattered about the paranasal sinuses. Scattered fluid/membrane thickening in the right mastoid air cells. No apparent mass in the posterior nasopharynx or skull base.       Impression    IMPRESSION:  1.  New small left anterior border zone infarct along the posterior margin of prior infarct compared to 6/14/2024.  2.  Similar distribution of the remaining subacute infarcts with petechial hemorrhage in the left posterior border zone. Slight progressed local mass effect without significant  midline shift or maged hematoma formation.

## 2024-06-18 NOTE — PLAN OF CARE
Patient remains alert and oriented x4 (able to confirm with yes/no questions). Severe aphasia remains. Patient follows commands but is slow to respond at times and often needs multiple cues. R arm weakness has persisted but all other extremities equal. R facial droop. Vital signs stable on room air. Up with SBA to bathroom. Good urine output. Calls appropriately. Potential discharge to ARU pending.

## 2024-06-18 NOTE — PROGRESS NOTES
06/18/24 1056   Appointment Info   Signing Clinician's Name / Credentials (SLP) Jamila Murrell MS CCC SLP   General Information   Onset of Illness/Injury or Date of Surgery 06/12/24   Referring Physician Dr. Gallagher   Patient/Family Therapy Goal Statement (SLP) Patient is not able to state.   Pertinent History of Current Problem Per notes; This is a 63-year-old male who presented to the hospital with confusion and EMS was notified around 1330 and in the ED was found to have mixed expressive/receptive language deficits and right-sided visual field cut as per neurology. Left MCA stroke.   General Observations Patient with new stroke on 6/17/24 made NPO with increased aphasia.   Type of Evaluation   Type of Evaluation Swallow Evaluation   General Swallowing Observations   Swallowing Evaluation Videofluoroscopic swallow study (VFSS)   VFSS Evaluation   Radiologist Dr. Obrien   Views Taken left lateral   Physical Location of Procedure [facility]   VFSS Textures Trialed thin liquids;slightly thick liquids;pureed;soft & bite-sized;solid foods   VFSS Eval: Thin Liquid Texture Trial   Mode of Presentation, Thin Liquid spoon;straw;self-fed;fed by clinician   Order of Presentation 1 2 3 4   Preparatory Phase poor bolus control   Oral Phase, Thin Liquid premature pharyngeal entry   Bolus Location When Swallow Triggered valleculae   Pharyngeal Phase, Thin Liquid impaired tongue base retraction;residue in vallecula   Rosenbek's Penetration Aspiration Scale: Thin Liquid Trial Results 1 - no aspiration, contrast does not enter airway   Diagnostic Statement Mildly decreased bolus control with premature entry to the fvalleculae with piecemeal deglutition. No penetration or aspiration.   VFSS Eval: Slightly Thick Liquids   Mode of Presentation cup;self-fed   Order of Presentation 5   Preparatory Phase poor bolus control   Oral Phase premature pharyngeal entry   Bolus Location When Swallow Triggered valleculae   Pharyngeal Phase impaired  tongue base retraction;residue in vallecula   Rosenbek's Penetration Aspiration Scale 1 - no aspiration, contrast does not enter airway   Diagnostic Statement Mildly decreased bolus control with premature entry to the fvalleculae with piecemeal deglutition. No penetration or aspiration.   VFSS Evaluation: Puree Solid Texture Trial   Mode of Presentation, Puree spoon;self-fed   Order of Presentation 6   Preparatory Phase poor bolus control   Oral Phase, Puree impaired AP movement;premature pharyngeal entry   Bolus Location When Swallow Triggered valleculae   Pharyngeal Phase, Puree impaired tongue base retraction;residue in vallecula   Rosenbek's Penetration Aspiration Scale: Puree Food Trial Results 1 - no aspiration, contrast does not enter airway   Diagnostic Statement Mildly reduced AP coordination/control during AP movement with minimal vallecular residue.   VFSS Eval: Soft & Bite Sized   Mode of Presentation spoon;fed by clinician   Order of presentation 7   Preparatory Phase poor bolus control   Oral Phase impaired AP movement;premature phrayngeal entry   Bolus Location When Swallow Triggered valleculae   Pharyngeal Phase impaired tongue base retraction;residue in vallecula   Rosenbek's Penetration Aspiration Scale 1 - no aspiration, contrast does not enter airway   Diagnostic Statement Mildly reduced AP coordination/control during AP movement with minimal vallecular residue.   VFSS Evaluation: Solid Food Texture Trial   Mode of Presentation, Solid spoon;self-fed   Order of Presentation 9   Preparatory Phase poor bolus control   Oral Phase, Solid impaired AP movement;premature pharyngeal entry   Bolus Location When Swallow Triggered valleculae   Pharyngeal Phase, Solid impaired tongue base retraction;residue in vallecula   Rosenbek's Penetration Aspiration Scale: Solid Food Trial Results 1 - no aspiration, contrast does not enter airway   Diagnostic Statement Mild increased time for mastication and bolus  coordination during AP movement premature entry with minimal/mild vallecular residue.   Swallowing Recommendations   Diet Consistency Recommendations easy to chew (level 7);thin liquids (level 0)   Supervision Level for Intake close supervision needed   Mode of Delivery Recommendations bolus size, small;food moistened;no straws;slow rate of intake   Postural Recommendations none   Swallowing Maneuver Recommendations alternate food and liquid intake;extra swallow   Monitoring/Assistance Required (Eating/Swallowing) check mouth frequently for oral residue/pocketing;stop eating activities when fatigue is present;monitor for cough or change in vocal quality with intake   Recommended Feeding/Eating Techniques (Swallow Eval) maintain upright sitting position for eating;maintain upright posture during/after eating for 30 minutes;minimize distractions during oral intake;set-up and prepare tray   Medication Administration Recommendations, Swallowing (SLP) Whole as tolerated.   General Therapy Interventions   Planned Therapy Interventions Dysphagia Treatment   Dysphagia treatment Modified diet education;Instruction of safe swallow strategies   Clinical Impression   Criteria for Skilled Therapeutic Interventions Met (SLP Eval) Yes, treatment indicated   SLP Diagnosis Mild to moderate oral and pharyngeal dysphagia   Risks & Benefits of therapy have been explained evaluation/treatment results reviewed;care plan/treatment goals reviewed;risks/benefits reviewed;current/potential barriers reviewed;participants voiced agreement with care plan;participants included;patient   Clinical Impression Comments Patient presents with mild to moderate oral and pharyngeal dysphagia on today's study characterized by reduced bolus control during AP movement with premature entry to the vallecular. There was piecemeal deglutition of thin and slightly thick liquids without penetration/aspiration. Increased time for mastication of solids with  decreased bolus coordination/control during AP movement with premature entry to the valleculae. Minimal to mild vallecular residue after the swallow. No penetration or aspiration. Patient with increased risk for aspiration due to reduced coordination. Recommend: 1. May return to IDDSI level 7 regular easy to chew foods and thin liquids. 2. Up in a chair for all meals, no straws, small bites/sips, check for oral residue and alternate liquids/solids every few bites. Hold diet if overt sx of aspiration present.   SLP Total Evaluation Time   Evaluation, videofluoroscopic eval of swallow function Minutes (38073) 17   SLP Goals   Therapy Frequency (SLP Eval) daily   SLP Predicted Duration/Target Date for Goal Attainment 07/02/24   SLP Goals Swallow   SLP: Safely tolerate diet without signs/symptoms of aspiration Regular diet;Thin liquids;With use of swallow precautions;With assistance/supervision   Swallowing Dysfunction &/or Oral Function for Feeding   Treatment of Swallowing Dysfunction &/or Oral Function for Feeding Minutes (91525) 10   Symptoms Noted During/After Treatment None   Treatment Detail/Skilled Intervention Patient was provided education on the anatomy physiology of the swallow by viewing images of the study. Rationale for continued modified diet and swallow strategies. Patient will need on going education given aphasia.   SLP Discharge Planning   SLP Plan Meal f/u on level 7 easy to chew and thins.   SLP Discharge Recommendation [facility]-Motivated patient will benefit from intensive, interdisciplinary therapy.  Anticipate will be able to tolerate 3 hours of therapy per day   SLP Rationale for DC Rec Patient's swallow and communication are well below his baseline. Given severity of both receptive and expressive language skills, anticipate pt will need a longer course of therapy to be able to communicate basic wants/needs after discharge. If pt has 24 hour assist with all communication tasks and  ability to attend OP SLP, ARU would be appropriate. If support isn't in place, TCU would be the best option.   SLP Brief overview of current status  Patient with mild to moderate oral and pharyngeal dysphagia rec IDDSI level 7 with thin liquids no straws.   Total Session Time   Total Session Time (sum of timed and untimed services) 27

## 2024-06-18 NOTE — PROGRESS NOTES
[facility] EEG #  portable, ordered by Trista flores.    Pt is awake, drowsy, asleep on EEG.   Photic stimulation performed.    Pt is cooperative and can follow commands.   Having problems with expressive aphasia.

## 2024-06-18 NOTE — PLAN OF CARE
Pt here with L MCA/CVA; L ventricle thrombus    A/OxUTA, WFD and aphasic. Able to sometimes not yes and no to questions. Expressive aphasia. RUE weakness, 4/5, weak grasp. R droop, R field cut. Speech slurred at times. VSS RA; SBP <160. A1/GB; SBA. Skin WDL ex for scattered bruising on arms. BG checks. Easy to chew, thin liquids. Pills whole in water. 2000mL fluid restriction. K recheck in AM. Plan for ECHO and EEG. Complete workup pending.

## 2024-06-19 ENCOUNTER — APPOINTMENT (OUTPATIENT)
Dept: OCCUPATIONAL THERAPY | Facility: CLINIC | Age: 64
DRG: 064 | End: 2024-06-19
Attending: PSYCHIATRY & NEUROLOGY
Payer: COMMERCIAL

## 2024-06-19 ENCOUNTER — APPOINTMENT (OUTPATIENT)
Dept: PHYSICAL THERAPY | Facility: CLINIC | Age: 64
DRG: 064 | End: 2024-06-19
Attending: PSYCHIATRY & NEUROLOGY
Payer: COMMERCIAL

## 2024-06-19 ENCOUNTER — APPOINTMENT (OUTPATIENT)
Dept: CARDIOLOGY | Facility: CLINIC | Age: 64
DRG: 064 | End: 2024-06-19
Attending: PSYCHIATRY & NEUROLOGY
Payer: COMMERCIAL

## 2024-06-19 ENCOUNTER — APPOINTMENT (OUTPATIENT)
Dept: SPEECH THERAPY | Facility: CLINIC | Age: 64
DRG: 064 | End: 2024-06-19
Attending: PSYCHIATRY & NEUROLOGY
Payer: COMMERCIAL

## 2024-06-19 LAB
ANION GAP SERPL CALCULATED.3IONS-SCNC: 13 MMOL/L (ref 7–15)
ATRIAL RATE - MUSE: 90 BPM
BUN SERPL-MCNC: 15 MG/DL (ref 8–23)
CALCIUM SERPL-MCNC: 9.3 MG/DL (ref 8.8–10.2)
CHLORIDE SERPL-SCNC: 102 MMOL/L (ref 98–107)
CREAT SERPL-MCNC: 0.87 MG/DL (ref 0.67–1.17)
DEPRECATED HCO3 PLAS-SCNC: 27 MMOL/L (ref 22–29)
DIASTOLIC BLOOD PRESSURE - MUSE: NORMAL MMHG
EGFRCR SERPLBLD CKD-EPI 2021: >90 ML/MIN/1.73M2
GLUCOSE BLDC GLUCOMTR-MCNC: 113 MG/DL (ref 70–99)
GLUCOSE BLDC GLUCOMTR-MCNC: 116 MG/DL (ref 70–99)
GLUCOSE BLDC GLUCOMTR-MCNC: 135 MG/DL (ref 70–99)
GLUCOSE BLDC GLUCOMTR-MCNC: 144 MG/DL (ref 70–99)
GLUCOSE BLDC GLUCOMTR-MCNC: 99 MG/DL (ref 70–99)
GLUCOSE SERPL-MCNC: 111 MG/DL (ref 70–99)
INR PPP: 1.41 (ref 0.85–1.15)
INTERPRETATION ECG - MUSE: NORMAL
LVEF ECHO: NORMAL
MAGNESIUM SERPL-MCNC: 2 MG/DL (ref 1.7–2.3)
P AXIS - MUSE: 50 DEGREES
POTASSIUM SERPL-SCNC: 2.9 MMOL/L (ref 3.4–5.3)
PR INTERVAL - MUSE: 158 MS
QRS DURATION - MUSE: 96 MS
QT - MUSE: 386 MS
QTC - MUSE: 472 MS
R AXIS - MUSE: -1 DEGREES
SODIUM SERPL-SCNC: 142 MMOL/L (ref 135–145)
SYSTOLIC BLOOD PRESSURE - MUSE: NORMAL MMHG
T AXIS - MUSE: 53 DEGREES
VENTRICULAR RATE- MUSE: 90 BPM

## 2024-06-19 PROCEDURE — 250N000011 HC RX IP 250 OP 636: Mod: JZ

## 2024-06-19 PROCEDURE — 83735 ASSAY OF MAGNESIUM: CPT | Performed by: INTERNAL MEDICINE

## 2024-06-19 PROCEDURE — 85610 PROTHROMBIN TIME: CPT | Performed by: INTERNAL MEDICINE

## 2024-06-19 PROCEDURE — 80048 BASIC METABOLIC PNL TOTAL CA: CPT

## 2024-06-19 PROCEDURE — 93306 TTE W/DOPPLER COMPLETE: CPT | Mod: 26 | Performed by: INTERNAL MEDICINE

## 2024-06-19 PROCEDURE — 250N000013 HC RX MED GY IP 250 OP 250 PS 637: Performed by: INTERNAL MEDICINE

## 2024-06-19 PROCEDURE — 97116 GAIT TRAINING THERAPY: CPT | Mod: GP

## 2024-06-19 PROCEDURE — 99232 SBSQ HOSP IP/OBS MODERATE 35: CPT | Mod: GC | Performed by: PSYCHIATRY & NEUROLOGY

## 2024-06-19 PROCEDURE — 97112 NEUROMUSCULAR REEDUCATION: CPT | Mod: GO

## 2024-06-19 PROCEDURE — 250N000013 HC RX MED GY IP 250 OP 250 PS 637: Performed by: PHYSICIAN ASSISTANT

## 2024-06-19 PROCEDURE — 99233 SBSQ HOSP IP/OBS HIGH 50: CPT | Mod: FS

## 2024-06-19 PROCEDURE — 92526 ORAL FUNCTION THERAPY: CPT | Mod: GN | Performed by: SPEECH-LANGUAGE PATHOLOGIST

## 2024-06-19 PROCEDURE — 99233 SBSQ HOSP IP/OBS HIGH 50: CPT | Performed by: INTERNAL MEDICINE

## 2024-06-19 PROCEDURE — 120N000013 HC R&B IMCU

## 2024-06-19 PROCEDURE — 36415 COLL VENOUS BLD VENIPUNCTURE: CPT

## 2024-06-19 PROCEDURE — 258N000003 HC RX IP 258 OP 636: Mod: JZ | Performed by: STUDENT IN AN ORGANIZED HEALTH CARE EDUCATION/TRAINING PROGRAM

## 2024-06-19 PROCEDURE — 250N000011 HC RX IP 250 OP 636: Mod: JZ | Performed by: STUDENT IN AN ORGANIZED HEALTH CARE EDUCATION/TRAINING PROGRAM

## 2024-06-19 PROCEDURE — 255N000002 HC RX 255 OP 636: Performed by: HOSPITALIST

## 2024-06-19 PROCEDURE — 36415 COLL VENOUS BLD VENIPUNCTURE: CPT | Performed by: INTERNAL MEDICINE

## 2024-06-19 PROCEDURE — 84132 ASSAY OF SERUM POTASSIUM: CPT | Performed by: INTERNAL MEDICINE

## 2024-06-19 PROCEDURE — 250N000013 HC RX MED GY IP 250 OP 250 PS 637

## 2024-06-19 PROCEDURE — 97530 THERAPEUTIC ACTIVITIES: CPT | Mod: GP

## 2024-06-19 PROCEDURE — 999N000208 ECHOCARDIOGRAM COMPLETE

## 2024-06-19 PROCEDURE — 97535 SELF CARE MNGMENT TRAINING: CPT | Mod: GO

## 2024-06-19 PROCEDURE — 250N000012 HC RX MED GY IP 250 OP 636 PS 637: Performed by: INTERNAL MEDICINE

## 2024-06-19 PROCEDURE — 92507 TX SP LANG VOICE COMM INDIV: CPT | Mod: GN | Performed by: SPEECH-LANGUAGE PATHOLOGIST

## 2024-06-19 RX ORDER — POTASSIUM CHLORIDE 1500 MG/1
20 TABLET, EXTENDED RELEASE ORAL ONCE
Status: CANCELLED | OUTPATIENT
Start: 2024-06-19 | End: 2024-06-19

## 2024-06-19 RX ORDER — POTASSIUM CHLORIDE 1500 MG/1
40 TABLET, EXTENDED RELEASE ORAL ONCE
Status: CANCELLED | OUTPATIENT
Start: 2024-06-19 | End: 2024-06-19

## 2024-06-19 RX ORDER — POTASSIUM CHLORIDE 1500 MG/1
40 TABLET, EXTENDED RELEASE ORAL DAILY
Status: DISCONTINUED | OUTPATIENT
Start: 2024-06-20 | End: 2024-06-20 | Stop reason: ALTCHOICE

## 2024-06-19 RX ORDER — POTASSIUM CHLORIDE 1500 MG/1
40 TABLET, EXTENDED RELEASE ORAL ONCE
Status: COMPLETED | OUTPATIENT
Start: 2024-06-19 | End: 2024-06-19

## 2024-06-19 RX ORDER — FUROSEMIDE 10 MG/ML
40 INJECTION INTRAMUSCULAR; INTRAVENOUS 2 TIMES DAILY
Status: COMPLETED | OUTPATIENT
Start: 2024-06-19 | End: 2024-06-19

## 2024-06-19 RX ORDER — POTASSIUM CHLORIDE 1500 MG/1
20 TABLET, EXTENDED RELEASE ORAL ONCE
Status: COMPLETED | OUTPATIENT
Start: 2024-06-19 | End: 2024-06-19

## 2024-06-19 RX ADMIN — INSULIN ASPART 1 UNITS: 100 INJECTION, SOLUTION INTRAVENOUS; SUBCUTANEOUS at 12:20

## 2024-06-19 RX ADMIN — SPIRONOLACTONE 25 MG: 25 TABLET ORAL at 08:23

## 2024-06-19 RX ADMIN — POTASSIUM CHLORIDE 20 MEQ: 1500 TABLET, EXTENDED RELEASE ORAL at 14:19

## 2024-06-19 RX ADMIN — ATORVASTATIN CALCIUM 40 MG: 40 TABLET, FILM COATED ORAL at 20:05

## 2024-06-19 RX ADMIN — APIXABAN 5 MG: 5 TABLET, FILM COATED ORAL at 20:05

## 2024-06-19 RX ADMIN — CARVEDILOL 25 MG: 25 TABLET, FILM COATED ORAL at 17:31

## 2024-06-19 RX ADMIN — APIXABAN 5 MG: 5 TABLET, FILM COATED ORAL at 08:22

## 2024-06-19 RX ADMIN — HUMAN ALBUMIN MICROSPHERES AND PERFLUTREN 9 ML: 10; .22 INJECTION, SOLUTION INTRAVENOUS at 10:37

## 2024-06-19 RX ADMIN — SACUBITRIL AND VALSARTAN 1 TABLET: 49; 51 TABLET, FILM COATED ORAL at 08:22

## 2024-06-19 RX ADMIN — FUROSEMIDE 40 MG: 10 INJECTION, SOLUTION INTRAMUSCULAR; INTRAVENOUS at 14:18

## 2024-06-19 RX ADMIN — LEVETIRACETAM 1500 MG: 100 INJECTION, SOLUTION INTRAVENOUS at 17:31

## 2024-06-19 RX ADMIN — POTASSIUM CHLORIDE 40 MEQ: 1500 TABLET, EXTENDED RELEASE ORAL at 12:20

## 2024-06-19 RX ADMIN — FUROSEMIDE 40 MG: 10 INJECTION, SOLUTION INTRAMUSCULAR; INTRAVENOUS at 19:01

## 2024-06-19 RX ADMIN — LEVETIRACETAM 1500 MG: 100 INJECTION, SOLUTION INTRAVENOUS at 05:46

## 2024-06-19 RX ADMIN — SACUBITRIL AND VALSARTAN 1 TABLET: 49; 51 TABLET, FILM COATED ORAL at 22:09

## 2024-06-19 ASSESSMENT — ACTIVITIES OF DAILY LIVING (ADL)
ADLS_ACUITY_SCORE: 31
ADLS_ACUITY_SCORE: 26
ADLS_ACUITY_SCORE: 31
ADLS_ACUITY_SCORE: 26
ADLS_ACUITY_SCORE: 31

## 2024-06-19 NOTE — PROGRESS NOTES
[facility]    Stroke Sign Off Note    Interval Events  NAEO    ECHO showed no thrombus    HPI Summary  Cirilo Gardner is a 63 year old male who has not seen a doctor in many years, not on any medication PTA. He was brought to the ED 6/12 after he was on the phone with someone who noted him to be confused and notified EMS. In the ED he was noted to have mixed expressive/receptive language deficits and R visual field cut. On further questioning of the patient he was able to provide history that he woke up around 0300 feeling ok, around 0800 he felt that something wasn't right and by 1000 something was definitely not right. TNK not given due to presentation outside the conventional treatment window. No thrombectomy given absence of proximal vessel occlusion. MRI with L temporal/occipital and frontal infarcts. LVEF 15-20% with cardiac thrombus. He has been started on Coumadin and is being bridged on Lovenox. Stoke code was activated at 6/17 for acute onset right upper and lower extremity weakness and worsening aphasia.     Stroke Evaluation Summarized     MRI/Head CT CT 6/12: No acute intercranial hemorrhage  CT 6/14: evolving L temporal/occipital infarct with small areas of petechial hemorrhage, small amount SAH in L temporal/occipital region, mild mass effect on posterior L lateral ventricle without hydrocephalus or entrapment   CT 6/17: Evolving subacute left anterior and posterior border zone infarcts with redemonstration of left frontoparietal petechial hemorrhage. Trace left temporal subarachnoid hemorrhage. No maged hematoma, progressive mass effect or convincing new territory of involvement.      MRI 6/14: subacute infarct of L temporal/occipital and L parasagittal frontal regions, petechial hemorrhage of temporal/occipital infarct (only DWI and sagittal T1 sequences completed)  MRI 6/17: New small left anterior border zone infarct along the posterior margin of prior  infarct compared to 6/14/2024. Similar distribution of the remaining subacute infarcts with petechial hemorrhage in the left posterior border zone. Slight progressed local mass effect without significant midline shift or maged hematoma formation.   Intracranial Vasculature CTA 6/12: reduced arborization inferior L MCA M3, no clear LVO  CTA 6/17: Status quo as 6/12 CTA   Cervical Vasculature CTA 6/12: Lack flow L vertebral artery, age indeterminate,  R ICA short segment <50% narrowing, Soft plaque L ICA with mild stenosis   CTA 6/17: No significant change since 6/12/2024. Unchanged occluded left vertebral artery from the origin to the distal V4 segment. The remaining major vessels of the head and neck are patent without flow-limiting stenosis.     Carotid US 6/14:  1. Less than 50% diameter stenosis of the right ICA relative to the  distal ICA diameter.   2. Less than 50% diameter stenosis of the left ICA relative to the  distal ICA diameter.   3. Occlusion of the left vertebral artery, unchanged when compared to  the prior CT from 6/12/2024.             Echocardiogram 6/13: LVEF 15-20%, mild to moderate concentric LVH, severe global hypokinesia  of LV, apical L ventricular thrombus 1.5x1.9cm, moderate biatrial enlargement     6/19: Ef 35%, no thrombus   EKG/Telemetry Sinus rhythm   Other Testing EEG: Pending      LDL  6/13/2024: 82 mg/dL   A1C  6/12/2024: 6.4 %   Troponin 6/18/2024: 72 ng/L         Impression   Transient acute worsening of pre-existing aphasia and new transient  right sided weakness -new stroke 6/17 in small left anterior border zone infarct along the posterior margin of prior (6/12) infarct could be likely evolution/completion of known strokes or a new stroke from thrombus embolozing. Concern is high for possible seizure/Todds-palsy from cortical stroke  History of recent acute L frontal, temporal and occipital infarcts due to cardioembolism 6/12  Apical thrombus, LVEF 15-20%, severe global  "hypokinesia - Repeat ECHO 6 days later showed resolution/absence of the same  L ICA soft plaque -not felt to be symptomatic   L vert occlusion, unknown chronicity        Plan  - Continue Keppra maintenance of 1.5 g BID (Loaded with 4.5 g on 6/17)  - Neuro checks q 4 hours  - Cardiology has started him on Eliquis. Continue the same.      Patient Follow-up    - Stroke Neurology follow up in 6-8 weeks       No further stroke evaluation is recommended, so we will sign off. Please contact us with any additional questions.      The Stroke Staff is Dr. Keenan.    Trista Glover MD  Vascular Neurology Fellow    To page me or covering stroke neurology team member, click here: [facility]  Choose \"On Call\" tab at top, then select \"NEUROLOGY/ALL SITES\" from middle drop-down box, press Enter, then look for \"stroke\" or \"telestroke\" for your site.  ______________________________________________________    Clinically Significant Risk Factors        # Hypokalemia: Lowest K = 2.9 mmol/L in last 2 days, will replace as needed            # Acute heart failure with reduced ejection fraction: last echo with EF <40% and receiving IV diuretics             # Obesity: Estimated body mass index is 34.15 kg/m  as calculated from the following:    Height as of this encounter: 1.829 m (6').    Weight as of this encounter: 114.2 kg (251 lb 12.8 oz).               Medications   Scheduled Meds  Current Facility-Administered Medications   Medication Dose Route Frequency Provider Last Rate Last Admin    apixaban ANTICOAGULANT (ELIQUIS) tablet 5 mg  5 mg Oral BID Steven Barraza MD   5 mg at 06/19/24 0822    atorvastatin (LIPITOR) tablet 40 mg  40 mg Oral QPM More, Radha CRAIN PA-C   40 mg at 06/18/24 2012    carvedilol (COREG) tablet 25 mg  25 mg Oral BID w/meals Ashley Yao PA-C   25 mg at 06/18/24 1849    [Held by provider] empagliflozin (JARDIANCE) tablet 10 mg  10 mg Oral Daily Margarita Marvin, CNP   10 mg at 06/17/24 0837    furosemide " (LASIX) injection 40 mg  40 mg Intravenous BID Ashley Yao PA-C   40 mg at 06/19/24 1418    insulin aspart (NovoLOG) injection (RAPID ACTING)  1-7 Units Subcutaneous TID AC Louisa Townsend DO   1 Units at 06/19/24 1220    insulin aspart (NovoLOG) injection (RAPID ACTING)  1-5 Units Subcutaneous At Bedtime Louisa Townsend DO        iopamidol (ISOVUE-370) solution 500 mL  500 mL Intravenous Once Steven Barraza MD        levETIRAcetam (KEPPRA) 1,500 mg in sodium chloride 0.9 % 125 mL intermittent infusion  1,500 mg Intravenous Q12H Trista Glover  mL/hr at 06/19/24 0546 1,500 mg at 06/19/24 0546    [START ON 6/20/2024] potassium chloride reyna ER (KLOR-CON M20) CR tablet 40 mEq  40 mEq Oral Daily Ashley Yao PA-C        sacubitril-valsartan (ENTRESTO) 49-51 MG per tablet 1 tablet  1 tablet Oral BID Ashley Yao PA-C   1 tablet at 06/19/24 0822    sodium chloride (PF) 0.9% PF flush 3 mL  3 mL Intracatheter Q8H Cameron Frederick PA-C   3 mL at 06/19/24 1054    sodium chloride (PF) 0.9% PF flush  mL   mL Intravenous Once Steven Barraza MD        spironolactone (ALDACTONE) tablet 25 mg  25 mg Oral Daily Ashley Yao PA-C   25 mg at 06/19/24 0823       Infusion Meds  Current Facility-Administered Medications   Medication Dose Route Frequency Provider Last Rate Last Admin    medication instruction - No oral meds if patient didn't pass dysphagia screen   Does not apply Continuous PRN Cameron Frederick PA-C        Medication Instructions - Avoid dextrose in IV solutions.   Intravenous Continuous PRN Cameron Frederick PA-C           PRN Meds  Current Facility-Administered Medications   Medication Dose Route Frequency Provider Last Rate Last Admin    acetaminophen (TYLENOL) Suppository 650 mg  650 mg Rectal Q4H PRN Yoly, Cameron Eitan, PA-C        glucose gel 15-30 g  15-30 g Oral Q15 Min PRN Cameron Frederick PA-C        Or     dextrose 50 % injection 25-50 mL  25-50 mL Intravenous Q15 Min PRN Cameron Frederick PA-C        Or    glucagon injection 1 mg  1 mg Subcutaneous Q15 Min PRN Cameron Frederick PA-C        labetalol (NORMODYNE/TRANDATE) injection 10-20 mg  10-20 mg Intravenous Q10 Min PRN Cameron Frederick PA-C        Or    hydrALAZINE (APRESOLINE) injection 10-20 mg  10-20 mg Intravenous Q1H PRN Cameron Frederick PA-C        IF patient diabetic - HOLD: ALL ORAL HYPOGLYCEMICS: glipizide, glyburide, glimepiride, gliclazide, metformin (Glucophage), any metformin (Glucophage) containing medication, rosiglitazone (Avandia), pioglitazone (Actos), or sitagliptin (Januvia) on day of the procedure   Does not apply HOLD Steven Barraza MD        ipratropium - albuterol 0.5 mg/2.5 mg/3 mL (DUONEB) neb solution 3 mL  3 mL Nebulization Q4H PRN Luciano Gallagher MD        lidocaine (LMX4) cream   Topical Q1H PRN Cameron Frederick PA-C        lidocaine 1 % 0.1-1 mL  0.1-1 mL Other Q1H PRN Cameron Frederick PA-C        medication instruction - No oral meds if patient didn't pass dysphagia screen   Does not apply Continuous PRN Cameron Frederick PA-C        Medication Instructions - Avoid dextrose in IV solutions.   Intravenous Continuous PRN Cameron Frederick PA-C        ondansetron (ZOFRAN ODT) ODT tab 4 mg  4 mg Oral Q6H PRN Cameron Frederick PA-C        Or    ondansetron (ZOFRAN) injection 4 mg  4 mg Intravenous Q6H PRN Cameron Frederick PA-C        sodium chloride (PF) 0.9% PF flush 10 mL  10 mL Intravenous Q10 Min PRN Steven Barraza MD        sodium chloride (PF) 0.9% PF flush 3 mL  3 mL Intracatheter q1 min prn Cameron Frederick PA-C   3 mL at 06/13/24 0020    sodium chloride (PF) 0.9% PF flush 5-10 mL  5-10 mL Intravenous Q5 Min PRN Redd Saavedra MD              PHYSICAL EXAMINATION  Temp:  [97.7  F (36.5  C)-98.5  F (36.9  C)] 98  F (36.7  " C)  Pulse:  [54-72] 72  Resp:  [16-20] 16  BP: (115-151)/(58-76) 128/76  SpO2:  [90 %-95 %] 92 %      Neuro: Awake, alert, mild R facial droop, answers yes/no appropriately, cannot name or express himself well, follows simple commands, no drift in all 4 limbs    Imaging  I personally reviewed all imaging; relevant findings per HPI.     Lab Results Data   CBC  Recent Labs   Lab 06/17/24  1808 06/16/24  0731 06/15/24  0612   WBC 8.7 8.2 8.6   RBC 5.37 4.89 4.65   HGB 17.0 15.3 14.2   HCT 50.2 47.2 44.5    310 281     Basic Metabolic Panel    Recent Labs   Lab 06/19/24  1212 06/19/24  0828 06/19/24  0819 06/18/24  0803 06/18/24  0755 06/17/24  2117 06/17/24  1808   NA  --   --  142  --  142  --  140   POTASSIUM  --   --  2.9*  --  3.5  --  3.5   CHLORIDE  --   --  102  --  105  --  100   CO2  --   --  27  --  24  --  24   BUN  --   --  15.0  --  17.9  --  24.4*   CR  --   --  0.87  --  0.94  --  1.18*   * 99 111*   < > 96   < > 126*   PARISH  --   --  9.3  --  9.0  --  9.5    < > = values in this interval not displayed.     Liver Panel  No results for input(s): \"PROTTOTAL\", \"ALBUMIN\", \"BILITOTAL\", \"ALKPHOS\", \"AST\", \"ALT\", \"BILIDIRECT\" in the last 168 hours.  INR    Recent Labs   Lab Test 06/19/24  0819 06/18/24  0755 06/17/24  1808   INR 1.41* 1.28* 1.19*      Lipid Profile    Recent Labs   Lab Test 06/13/24  0742   CHOL 135   HDL 41   LDL 82   TRIG 58     A1C    Recent Labs   Lab Test 06/12/24  1408   A1C 6.4*     Troponin    Recent Labs   Lab 06/18/24  0755 06/17/24  1808 06/12/24  2226   CTROPT 72* 80* 83*          Data       "

## 2024-06-19 NOTE — PLAN OF CARE
Goal Outcome Evaluation:    No changes overnight. Pt orientation difficult to assess d/t severe expressive aphasia. Follows simple commands. Able to state . Accuracy of yes/no response is inconsistent. R field cut. Slight R droop. RUE moderate grasp, R pronator drift. RUE 4/5. VSS on RA. SBP goal <160. Tele SR. Easy to chew diet, thins, no straws. 2L FR. Takes pills whole. Strict I&O. Continent of urine. Ambulating SBA. Plan for Echo today. Discharge plan pending. Therapy rec ARU.

## 2024-06-19 NOTE — CONSULTS
Patient has [employer] through the marketplace.    Xarelto/Eliquis:  $200/mo. Upon receipt of RX Discharge Pharmacy can provide copay savings card from [employer] or [employer], respectively, to reduce this to $10/mo.    Brie Contreras  Pharmacy Technician/Liaison, Discharge Pharmacy   [phone] (voice or text)  [email or web address]

## 2024-06-19 NOTE — PROGRESS NOTES
[facility]    Cardiology Progress Note    Primary Cardiologist: Dr. Sung    Date of Admission: 6/12/2024  Service Date: 06/19/2024     Summary:  Mr. Cirilo Gardner is a very pleasant 63 year old male with a past medical history of obesity and no other known medical history (has not seen a doctor in many years) who was admitted on 6/12/2024 after presenting with confusion and expressive/receptive aphasia along with a right visual cut and being found to have an acute left MCA stroke. Cardiology was consulted as the patient was found to have a new diagnosis of a cardiomyopathy with severe LV systolic dysfunction, ejection fraction 15-20% and LV apical thrombus on echocardiogram.    Interval History   No acute events overnight. Patient resting comfortably in recliner. Unable to complete CT coronary angiogram yesterday due to frequent PVCs and patient unable to follow commands. Weight down 7 lbs. Net -4.1 L. K 2.9 this morning, creatinine stable at 0.84    Telemetry: Sinus rhythm, HR 60s     Assessment:  Severe LV systolic dysfunction (newly diagnosed), ejection fraction 15-20%  - Etiology: Unclear at this time  - Fluid status: Challenging to assess due to body habitus.   - Diuretic regimen: None PTA.   - Ischemic evaluation: CT calcium score 6/18/2024- The total Agatston calcium score is 23.4 placing the patient in the 37th percentile when compared to age and gender matched control group with mild coronary calcifications     Guideline directed medical therapy (GDMT):  - Beta blocker: Carvedilol 25 mg BID  - ACEI/ARB/ARNI: Entresto 49-51 mg BID   - Aldactone antagonist: Spironolactone 25 mg daily   - SGLT2 inhibitor: Jardiance 10 mg once daily.    LV apical thrombus measuring 1.5 x 1.9 cm  Acute left MCA stroke likely due to #2  Severe expressive/receptive aphasia  Confusion  Mildly elevated troponin, likely demand  Obesity    Plan:   1.  In regards to ischemic evaluation, will hold on  pursuing at this time. There is a low suspicion of significant coronary artery disease with a mild calcium burden. If cardiomyopathy fails to improve with medical therapy, can reconsider coronary angiogram in the future after left ventricular LV thrombus resolves.   2. Continue cardiology medication regimen    -Eliquis 5 mg BID    -atorvastatin 40 mg daily    -carvedilol 25 mg BID    -jardiance 10 mg daily   -Entresto 49-51 mg BID    -spironolactone 25 mg daily   2. Decrease IV lasix to 40 mg BID. Possible transition to oral diuretic tomorrow   3. Potassium replacement per protocol   4. Start scheduled potassium chloride 40 mEq tomorrow   5. Updated carvedilol hold parameters, hold if heart rate <50 bpm  6. Daily standing weight, strict I/O, daily BMP and repletion of electrolytes   7. Follow up with cardiology ANDREW on June 28th 8. Cardiology to follow     Plan of care was formulated under the direction and guidance of Dr. Barraza.         Ashley Yao PA-C  Physician Assistant   [facility]  Pager: [phone]    Patient Active Problem List   Diagnosis    Confusion    Elevated troponin    Visual field cut    Acute CVA (cerebrovascular accident) (H)       Physical Exam   Temp: 97.7  F (36.5  C) Temp src: Oral BP: 139/76 Pulse: 59   Resp: 18 SpO2: 92 % O2 Device: None (Room air)    Vitals:    06/17/24 0906 06/18/24 0759 06/19/24 0554   Weight: 123.8 kg (273 lb) 117.4 kg (258 lb 12.8 oz) 114.2 kg (251 lb 12.8 oz)     Vital Signs with Ranges  Temp:  [97.7  F (36.5  C)-98.6  F (37  C)] 97.7  F (36.5  C)  Pulse:  [54-73] 59  Resp:  [18-20] 18  BP: (115-143)/(58-86) 139/76  SpO2:  [90 %-96 %] 92 %  I/O last 3 completed shifts:  In: 360 [P.O.:360]  Out: 4525 [Urine:4525]    General: Appears his stated age, well nourished, and in no acute distress, resting in recliner   Eyes: No scleral icterus.  HEENT: Neck supple. JVD not appreciated   Cardiovascular: Regular rate and rhythm, normal S1 and S2. No  murmur, rub, or gallop.  Extremities: Moves all extremities well and symmetrically. There is at least 1+ lower extremity edema   Respiratory: Breathing non-labored. Lungs clear to auscultation with no crackles or wheezes bilaterally.  Skin: No pallor or cyanosis.  Gastrointestinal: Non-distended.  Psych: Appropriate affect. Mentation normal.  Neurological: No gross focal deficits.    Medications   Current Facility-Administered Medications   Medication Dose Route Frequency Provider Last Rate Last Admin    medication instruction - No oral meds if patient didn't pass dysphagia screen   Does not apply Continuous PRN Cameron Frederick PA-C        Medication Instructions - Avoid dextrose in IV solutions.   Intravenous Continuous PRN Cameron Frederick PA-C         Current Facility-Administered Medications   Medication Dose Route Frequency Provider Last Rate Last Admin    apixaban ANTICOAGULANT (ELIQUIS) tablet 5 mg  5 mg Oral BID Steven Barraza MD   5 mg at 06/18/24 2012    atorvastatin (LIPITOR) tablet 40 mg  40 mg Oral QPM Radha Haney PA-C   40 mg at 06/18/24 2012    carvedilol (COREG) tablet 25 mg  25 mg Oral BID w/meals Ashley Yao PA-C   25 mg at 06/18/24 1849    empagliflozin (JARDIANCE) tablet 10 mg  10 mg Oral Daily Margarita Marvin CNP   10 mg at 06/17/24 0837    insulin aspart (NovoLOG) injection (RAPID ACTING)  1-7 Units Subcutaneous TID AC Louisa Townsend DO        insulin aspart (NovoLOG) injection (RAPID ACTING)  1-5 Units Subcutaneous At Bedtime Louisa Townsend DO        iopamidol (ISOVUE-370) solution 500 mL  500 mL Intravenous Once Steven Barraza MD        levETIRAcetam (KEPPRA) 1,500 mg in sodium chloride 0.9 % 125 mL intermittent infusion  1,500 mg Intravenous Q12H Trista Glover  mL/hr at 06/19/24 0546 1,500 mg at 06/19/24 0546    sacubitril-valsartan (ENTRESTO) 49-51 MG per tablet 1 tablet  1 tablet Oral BID Ashley Yao PA-C   1 tablet at  06/18/24 2256    sodium chloride (PF) 0.9% PF flush 3 mL  3 mL Intracatheter Q8H Cameron Frederick PA-C   3 mL at 06/19/24 0207    sodium chloride (PF) 0.9% PF flush  mL   mL Intravenous Once Steven Barraza MD        spironolactone (ALDACTONE) tablet 25 mg  25 mg Oral Daily Ashley Yao PA-C   25 mg at 06/18/24 1205     Data   Recent Labs   Lab 06/17/24  1808 06/16/24  0731 06/15/24  0612   WBC 8.7 8.2 8.6   HGB 17.0 15.3 14.2   HCT 50.2 47.2 44.5   MCV 94 97 96    310 281     Recent Labs   Lab 06/19/24  0140 06/18/24  2209 06/18/24  0803 06/18/24  0755 06/17/24  2117 06/17/24  1808 06/17/24  1129 06/17/24  1026   NA  --   --   --  142  --  140  --  143   POTASSIUM  --   --   --  3.5  --  3.5  --  3.4   CHLORIDE  --   --   --  105  --  100  --  104   CO2  --   --   --  24  --  24  --  27   ANIONGAP  --   --   --  13  --  16*  --  12   * 147* 99 96   < > 126*   < > 121*   BUN  --   --   --  17.9  --  24.4*  --  24.3*   CR  --   --   --  0.94  --  1.18*  --  1.15   GFRESTIMATED  --   --   --  >90  --  69  --  71   PARISH  --   --   --  9.0  --  9.5  --  9.4    < > = values in this interval not displayed.        Please kindly note that this document was completed in part using Dragon voice recognition software. Although reviewed after completion, some word substitutions and typographical errors may occur. Please contact me if clarification is needed.

## 2024-06-20 ENCOUNTER — APPOINTMENT (OUTPATIENT)
Dept: SPEECH THERAPY | Facility: CLINIC | Age: 64
DRG: 064 | End: 2024-06-20
Attending: PSYCHIATRY & NEUROLOGY
Payer: COMMERCIAL

## 2024-06-20 VITALS
RESPIRATION RATE: 18 BRPM | TEMPERATURE: 97.7 F | OXYGEN SATURATION: 94 % | WEIGHT: 252.4 LBS | HEIGHT: 72 IN | SYSTOLIC BLOOD PRESSURE: 102 MMHG | HEART RATE: 67 BPM | BODY MASS INDEX: 34.19 KG/M2 | DIASTOLIC BLOOD PRESSURE: 64 MMHG

## 2024-06-20 LAB
ANION GAP SERPL CALCULATED.3IONS-SCNC: 14 MMOL/L (ref 7–15)
BUN SERPL-MCNC: 14.6 MG/DL (ref 8–23)
CALCIUM SERPL-MCNC: 9.1 MG/DL (ref 8.8–10.2)
CHLORIDE SERPL-SCNC: 103 MMOL/L (ref 98–107)
CREAT SERPL-MCNC: 0.88 MG/DL (ref 0.67–1.17)
DEPRECATED HCO3 PLAS-SCNC: 26 MMOL/L (ref 22–29)
EGFRCR SERPLBLD CKD-EPI 2021: >90 ML/MIN/1.73M2
GLUCOSE BLDC GLUCOMTR-MCNC: 105 MG/DL (ref 70–99)
GLUCOSE BLDC GLUCOMTR-MCNC: 116 MG/DL (ref 70–99)
GLUCOSE BLDC GLUCOMTR-MCNC: 144 MG/DL (ref 70–99)
GLUCOSE SERPL-MCNC: 103 MG/DL (ref 70–99)
MAGNESIUM SERPL-MCNC: 1.9 MG/DL (ref 1.7–2.3)
POTASSIUM SERPL-SCNC: 3.2 MMOL/L (ref 3.4–5.3)
POTASSIUM SERPL-SCNC: 3.3 MMOL/L (ref 3.4–5.3)
POTASSIUM SERPL-SCNC: 3.6 MMOL/L (ref 3.4–5.3)
SODIUM SERPL-SCNC: 143 MMOL/L (ref 135–145)

## 2024-06-20 PROCEDURE — 250N000011 HC RX IP 250 OP 636: Mod: JZ | Performed by: STUDENT IN AN ORGANIZED HEALTH CARE EDUCATION/TRAINING PROGRAM

## 2024-06-20 PROCEDURE — 250N000013 HC RX MED GY IP 250 OP 250 PS 637

## 2024-06-20 PROCEDURE — 258N000003 HC RX IP 258 OP 636: Mod: JZ | Performed by: STUDENT IN AN ORGANIZED HEALTH CARE EDUCATION/TRAINING PROGRAM

## 2024-06-20 PROCEDURE — 92526 ORAL FUNCTION THERAPY: CPT | Mod: GN | Performed by: SPEECH-LANGUAGE PATHOLOGIST

## 2024-06-20 PROCEDURE — 99239 HOSP IP/OBS DSCHRG MGMT >30: CPT | Performed by: INTERNAL MEDICINE

## 2024-06-20 PROCEDURE — 99207 PR APP CREDIT; MD BILLING SHARED VISIT: CPT | Performed by: INTERNAL MEDICINE

## 2024-06-20 PROCEDURE — 99233 SBSQ HOSP IP/OBS HIGH 50: CPT | Mod: FS

## 2024-06-20 PROCEDURE — 83735 ASSAY OF MAGNESIUM: CPT | Performed by: INTERNAL MEDICINE

## 2024-06-20 PROCEDURE — 250N000013 HC RX MED GY IP 250 OP 250 PS 637: Performed by: INTERNAL MEDICINE

## 2024-06-20 PROCEDURE — 92507 TX SP LANG VOICE COMM INDIV: CPT | Mod: GN | Performed by: SPEECH-LANGUAGE PATHOLOGIST

## 2024-06-20 PROCEDURE — 80048 BASIC METABOLIC PNL TOTAL CA: CPT

## 2024-06-20 PROCEDURE — 36415 COLL VENOUS BLD VENIPUNCTURE: CPT

## 2024-06-20 PROCEDURE — 84132 ASSAY OF SERUM POTASSIUM: CPT

## 2024-06-20 RX ORDER — LEVETIRACETAM 750 MG/1
1500 TABLET ORAL 2 TIMES DAILY
Status: DISCONTINUED | OUTPATIENT
Start: 2024-06-20 | End: 2024-06-20 | Stop reason: HOSPADM

## 2024-06-20 RX ORDER — CARVEDILOL 25 MG/1
12.5 TABLET ORAL 2 TIMES DAILY WITH MEALS
DISCHARGE
Start: 2024-06-20 | End: 2024-07-01

## 2024-06-20 RX ORDER — ATORVASTATIN CALCIUM 40 MG/1
40 TABLET, FILM COATED ORAL EVERY EVENING
DISCHARGE
Start: 2024-06-20

## 2024-06-20 RX ORDER — FUROSEMIDE 40 MG
40 TABLET ORAL DAILY
Status: DISCONTINUED | OUTPATIENT
Start: 2024-06-20 | End: 2024-06-20 | Stop reason: HOSPADM

## 2024-06-20 RX ORDER — SPIRONOLACTONE 25 MG/1
25 TABLET ORAL DAILY
DISCHARGE
Start: 2024-06-21

## 2024-06-20 RX ORDER — POTASSIUM CHLORIDE 1500 MG/1
20 TABLET, EXTENDED RELEASE ORAL DAILY
Status: DISCONTINUED | OUTPATIENT
Start: 2024-06-20 | End: 2024-06-20

## 2024-06-20 RX ORDER — POTASSIUM CHLORIDE 1.5 G/1.58G
20 POWDER, FOR SOLUTION ORAL DAILY
Qty: 3 PACKET | Refills: 0 | Status: SHIPPED | OUTPATIENT
Start: 2024-06-21 | End: 2024-06-20

## 2024-06-20 RX ORDER — POTASSIUM CHLORIDE 1.5 G/1.58G
40 POWDER, FOR SOLUTION ORAL DAILY
Status: DISCONTINUED | OUTPATIENT
Start: 2024-06-20 | End: 2024-06-20 | Stop reason: HOSPADM

## 2024-06-20 RX ORDER — LEVETIRACETAM 750 MG/1
1500 TABLET ORAL 2 TIMES DAILY
DISCHARGE
Start: 2024-06-20

## 2024-06-20 RX ORDER — CARVEDILOL 25 MG/1
25 TABLET ORAL 2 TIMES DAILY WITH MEALS
DISCHARGE
Start: 2024-06-20 | End: 2024-06-20

## 2024-06-20 RX ORDER — FUROSEMIDE 40 MG
40 TABLET ORAL DAILY
DISCHARGE
Start: 2024-06-21 | End: 2024-06-20

## 2024-06-20 RX ORDER — POTASSIUM CHLORIDE 1.5 G/1.58G
20 POWDER, FOR SOLUTION ORAL DAILY
DISCHARGE
Start: 2024-06-21 | End: 2024-06-24

## 2024-06-20 RX ORDER — FUROSEMIDE 40 MG
40 TABLET ORAL DAILY
DISCHARGE
Start: 2024-06-21

## 2024-06-20 RX ORDER — POTASSIUM CHLORIDE 1.5 G/1.58G
20 POWDER, FOR SOLUTION ORAL DAILY
DISCHARGE
Start: 2024-06-21 | End: 2024-06-20

## 2024-06-20 RX ORDER — SPIRONOLACTONE 25 MG/1
25 TABLET ORAL DAILY
DISCHARGE
Start: 2024-06-21 | End: 2024-06-20

## 2024-06-20 RX ADMIN — EMPAGLIFLOZIN 10 MG: 10 TABLET, FILM COATED ORAL at 13:10

## 2024-06-20 RX ADMIN — APIXABAN 5 MG: 5 TABLET, FILM COATED ORAL at 08:39

## 2024-06-20 RX ADMIN — SACUBITRIL AND VALSARTAN 1 TABLET: 49; 51 TABLET, FILM COATED ORAL at 08:39

## 2024-06-20 RX ADMIN — LEVETIRACETAM 1500 MG: 100 INJECTION, SOLUTION INTRAVENOUS at 05:21

## 2024-06-20 RX ADMIN — CARVEDILOL 25 MG: 25 TABLET, FILM COATED ORAL at 08:39

## 2024-06-20 RX ADMIN — FUROSEMIDE 40 MG: 40 TABLET ORAL at 10:25

## 2024-06-20 RX ADMIN — INSULIN ASPART 1 UNITS: 100 INJECTION, SOLUTION INTRAVENOUS; SUBCUTANEOUS at 13:09

## 2024-06-20 RX ADMIN — POTASSIUM CHLORIDE 40 MEQ: 1.5 POWDER, FOR SOLUTION ORAL at 10:25

## 2024-06-20 RX ADMIN — SPIRONOLACTONE 25 MG: 25 TABLET ORAL at 08:39

## 2024-06-20 ASSESSMENT — ACTIVITIES OF DAILY LIVING (ADL)
ADLS_ACUITY_SCORE: 26

## 2024-06-20 NOTE — PROGRESS NOTES
"[facility]    Medicine Progress Note - Hospitalist Service    Date of Admission:  6/12/2024    Assessment & Plan     Summary:  63M admitted 6/12 with embolic appearing L hemispheric infarcts, new diagnosis of LVEF 15-20% and LV apical thrombus, started on warfarin with lovenox bridge. Other stroke workup: CTA w L vert occlusion at origin, <50% stenosis of bl cervical ICAs, LDL 82, A1c 6.4%. On 6/17 new stroke code for worsening expressive aphasia and RUE plegia. MRI shows new L anterior borderzone infarct, CTA with ?decreased distal L MCA arborization compared to prior imaging. Exam fluctuating a bit, not pressure dependent. Cardiology has switched pt from warfarin/lovenox-->DOAC.  EEG requested and revealed no evidence of seizures, but fluctuating neuro exam improved after keppra load so Neuro wants to continue medication.     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 In the ED, was afebrile, tachycardic and hypertensive with SBPs 170s. Was noted to have expressive/receptive deficits and right visual field cut. Code stroke was called. Labs notable for WBC 11.4, lytes/Cr stable, ,  proBNP 12k, trops elevated (57 -- 83). EKS showed ST with PVCs, possible LAE; no ST/T changes. Head CT neg. CTA head/neck showed an age-indeterminate lack of flow in the left vertebral artery of uncertain etiology and mild atherosclerotic narrowing  at the R ICA origin but was otherwise nl. CT perfusion study showed delayed contrast arrival in the area of the posterolateral aspect of the left cerebral hemisphere involving portions of the left temporal and left occipital lobes; findings suggestive of " infarct.   A1c 6.4, FLP with tot cholest 135, HDL 41, LDL 82.   Seen by stroke team. Felt to be out of the window for thrombolytics. Given ASA and Plavix load. Echo obtained and showed findings of an apical LV thrombus.  Findings were discussed with stroke service.  Aspirin and Plavix were stopped and he was placed on a low intensity heparin drip on 6/13 PM.  MRI brain 6/14 showed a moderate-sized evolving infarct in the temporal/occipital lobes with petechial hemorrhage and a similar zone of evolving infarct in the parasagittal left frontal lobe.  Stroke neurology service was made aware of these findings given recent initiation of anticoagulation.  Was continued on heparin drip.                Bedside sitter ordered on 6/14 AM after patient attempted to leave AMA x2. Less confused, less aphasic on 6/15, stop bedside sitter. Patient transitioned to warfarin + Lovenox. ---now has been transitioned to apixaban judy 6/1824 and tolerating    6/20/24   No new changes  Stroke neuro has now signed off - f/up stroke neuro in 6-8 wks  Continue Keppra bid at discharge (will change to po dosing today with pharmacy review) per neuro as fluctuating neuro exam improved with keppra load.  - Cont atorvastatin 40mg daily  - Tighten BP control, goal -160  - PT/OT, on modified diet per SLP  - plan for ARU evaluation after cardiology provide medical clearance for discharge     2. Acute LV thrombus  Newly diagnosed systolic CHF, chronicity unclear  NSTEMI, suspect type II dt demand ischemia in setting of CVA  Pulmonary hypertension    No prior known hx of CAD, CHF. On admission this stay, noted to have bilateral LE edema, patient mentioned had been there for several days though unclear in regards to the specifics. Trop trend as above. ProBNP significant elevated.  EKG nonischemic. No reports of chest pain.    *Echo obtained this stay and showed EF 15-20% with severe global hypokinesia of the LV and an apical LV thrombus. LV apex  clot 1.5x1.9cm, non-mobile. Also noted to have findings of biatrial enlargement, mod decreased RVSF and mod pulmonary hypertension.     -6/20/24 -   Appreciate Cardiology co-management  Pt not able to tolerate CTA, but calcium score is low - so no urgent ischemic work-up being planned that would require disruption of need anticoagulation    Cardiology following and ordering medications for ongoing diuresis  Awaiting recommendations today for ? Possible transition to po diuretics     - Entresto started     Spironolactone daily  - Anticoagulation as above  - Coreg now to 25mg BID     Jardiance has been on hold due to lower blood sugars 6/19/24  Awaiting cardiology recommendations after rounds today     3.  Hypokalemia    Still with hypokalemia today despite electrolyte replacement today - but improved  Per RN, cardiology ordering po potassium this am for replacement  Continue to monitor potassium closely with daily spironolactone and po diuretics given      4. Prediabetes  A1c 6.4 this stay.   - using sliding scale insulin while hospitalized    Blood sugars in last 24 hours -----[phone]  As above, will need to continue to monitor blood sugar trend closely with jardiance    - follow up with PCP after discharge for ongoing BG mgmt        Diet: Room Service  Combination Diet Thin Liquids (level 0) (No straws); Easy to Chew (level 7); Thin Liquids (level 0) (No straws)  Fluid restriction 2000 ML FLUID    DVT Prophylaxis: DOAC  Ravi Catheter: Not present  Lines: None     Cardiac Monitoring: ACTIVE order. Indication: Stroke, acute (48 hours)  Code Status: Full Code      Clinically Significant Risk Factors        # Hypokalemia: Lowest K = 2.9 mmol/L in last 2 days, will replace as needed            # Acute heart failure with reduced ejection fraction: last echo with EF <40% and receiving IV diuretics             # Obesity: Estimated body mass index is 34.15 kg/m  as calculated from the following:    Height as  of this encounter: 1.829 m (6').    Weight as of this encounter: 114.2 kg (251 lb 12.8 oz).             Disposition Plan     Medically Ready for Discharge: likely able to medically cleared for discharge by cardiology later today or 6/21/24.      SW to help with ? IRAIDA Mackey,   Hospitalist Service  [facility]  Securely message with [employer] (more info)  Text page via [employer] Paging/Directory   ______________________________________________________________________    Interval History     Tired this am.  No concerning events overnight.  No current HA, CP or N/V reported.  Has not yet eaten breakfast.     Physical Exam   Vital Signs: Temp: 98.5  F (36.9  C) Temp src: Oral BP: (!) 149/88 Pulse: 67   Resp: 15 SpO2: 96 % O2 Device: None (Room air)    Weight: 251 lbs 12.8 oz    GEN:  sleeping on right side in bed this am  Easily arousable to my voice and touch  HEENT:  Normocephalic/atraumatic, no scleral icterus, no nasal discharge  CV:  Regular rate and rhythm, no loud murmur or JVD.  S1 + S2 noted  LUNGS:  Clear to auscultation ant/postbilaterally without rales/rhonchi/wheezing/retractions.  Symmetric chest rise on inhalation noted.  ABD:  Active bowel sounds, soft  EXT:  trace pretibial edema bilaterally.  No cyanosis.    SKIN:  Dry to touch, no rashes or cyanosis visualized.    Medical Decision Making       49 MINUTES SPENT BY ME on the date of service doing chart review, history, exam, documentation & further activities per the note.      Data   Medications   Current Facility-Administered Medications   Medication Dose Route Frequency Provider Last Rate Last Admin    medication instruction - No oral meds if patient didn't pass dysphagia screen   Does not apply Continuous PRN Cameron Frederick PA-C        Medication Instructions - Avoid dextrose in IV solutions.   Intravenous Continuous PRN Cameron Frederick PA-C         Current Facility-Administered  Medications   Medication Dose Route Frequency Provider Last Rate Last Admin    apixaban ANTICOAGULANT (ELIQUIS) tablet 5 mg  5 mg Oral BID Steven Barraza MD   5 mg at 06/19/24 2005    atorvastatin (LIPITOR) tablet 40 mg  40 mg Oral QPM Radha Haney PA-C   40 mg at 06/19/24 2005    carvedilol (COREG) tablet 25 mg  25 mg Oral BID w/meals Ashley Yao PA-C   25 mg at 06/19/24 1731    [Held by provider] empagliflozin (JARDIANCE) tablet 10 mg  10 mg Oral Daily Margarita Marvin CNP   10 mg at 06/17/24 0837    insulin aspart (NovoLOG) injection (RAPID ACTING)  1-7 Units Subcutaneous TID AC Louisa Townsend DO   1 Units at 06/19/24 1220    insulin aspart (NovoLOG) injection (RAPID ACTING)  1-5 Units Subcutaneous At Bedtime Louisa Townsend DO        iopamidol (ISOVUE-370) solution 500 mL  500 mL Intravenous Once Steven Barraza MD        levETIRAcetam (KEPPRA) 1,500 mg in sodium chloride 0.9 % 125 mL intermittent infusion  1,500 mg Intravenous Q12H Trista Glover  mL/hr at 06/20/24 0521 1,500 mg at 06/20/24 0521    potassium chloride reyna ER (KLOR-CON M20) CR tablet 40 mEq  40 mEq Oral Daily Ashley Yao PA-C        sacubitril-valsartan (ENTRESTO) 49-51 MG per tablet 1 tablet  1 tablet Oral BID Ashley Yao PA-C   1 tablet at 06/19/24 2209    sodium chloride (PF) 0.9% PF flush 3 mL  3 mL Intracatheter Q8H Cameron Frederick PA-C   3 mL at 06/19/24 1054    sodium chloride (PF) 0.9% PF flush  mL   mL Intravenous Once Steven Barraza MD        spironolactone (ALDACTONE) tablet 25 mg  25 mg Oral Daily Ashley Yao PA-C   25 mg at 06/19/24 0823     Labs and Imaging results below reviewed today.  Recent Labs   Lab 06/17/24  1808 06/16/24  0731 06/15/24  0612   WBC 8.7 8.2 8.6   HGB 17.0 15.3 14.2   HCT 50.2 47.2 44.5   MCV 94 97 96    310 281     Recent Labs   Lab 06/20/24  0405 06/19/24  2357 06/19/24  2208 06/19/24  1733 06/19/24  0828 06/19/24  0819  24  0824   NA  --   --   --   --   --  142  --  142  --  140   POTASSIUM  --  3.3*  --   --   --  2.9*  --  3.5  --  3.5   CHLORIDE  --   --   --   --   --  102  --  105  --  100   CO2  --   --   --   --   --     ANIONGAP  --   --   --   --   --  13  --  13  --  16*   *  --  135* 113*   < > 111*   < > 96   < > 126*   BUN  --   --   --   --   --  15.0  --  17.9  --  24.4*   CR  --   --   --   --   --  0.87  --  0.94  --  1.18*   GFRESTIMATED  --   --   --   --   --  >90  --  >90  --  69   PARISH  --   --   --   --   --  9.3  --  9.0  --  9.5    < > = values in this interval not displayed.     Recent Labs   Lab 24  0405 24  2357 24  2208 24  1733 24  0828 24  0824  0824   NA  --   --   --   --   --  142  --  142  --  140   POTASSIUM  --  3.3*  --   --   --  2.9*  --  3.5  --  3.5   CHLORIDE  --   --   --   --   --  102  --  105  --  100   CO2  --   --   --   --   --     ANIONGAP  --   --   --   --   --    --  13  --  16*   *  --  135* 113*   < > 111*   < > 96   < > 126*   BUN  --   --   --   --   --  15.0  --  17.9  --  24.4*   CR  --   --   --   --   --  0.87  --  0.94  --  1.18*   GFRESTIMATED  --   --   --   --   --  >90  --  >90  --  69   PARISH  --   --   --   --   --  9.3  --  9.0  --  9.5   MAG  --   --   --   --   --  2.0  --   --   --   --     < > = values in this interval not displayed.       Recent Results (from the past 24 hour(s))   Echocardiogram Complete   Result Value    LVEF  30-35%    [facility]    [id number]  [id number]  [id number]^SILVESTRE^[facility]  Echocardiography Laboratory  [address]  [place] [address]  Phone [phone]  Fax [phone]     Name: LIMA GILL  MRN: [id number]  : 1960  Study Date:  06/19/2024 10:10 AM  Age: 63 yrs  Gender: Male  Patient Location: [facility]  Reason For Study: CVA  Ordering Physician: ABDULLAHI RIVERS  Performed By: Veronica Rivas     BSA: 2.3 m2  Height: 72 in  Weight: 251 lb  HR: 73  BP: 151/71 mmHg  ______________________________________________________________________________  Procedure  Complete Portable Echo Adult. Optison (NDC #4837-1836) given intravenously.  ______________________________________________________________________________  Interpretation Summary     The visual ejection fraction is 30-35%.  Left ventricular systolic function is moderately reduced.  No evidence of left ventricular mass or tumors  Compared to previously the left ventricular systolic function appears to be  marginally better. The left ventricle however measures larger on this study  than previously.  The right ventricular systolic function is moderate to severely reduced.  Grade III or advanced diastolic dysfunction.  The study was technically difficult. The study was technically limited.  ______________________________________________________________________________  Left Ventricle  The left ventricle is mildly dilated. There is normal left ventricular wall  thickness. Diastolic Doppler findings (E/E' ratio and/or other parameters)  suggest left ventricular filling pressures are increased. Grade III or  advanced diastolic dysfunction. The visual ejection fraction is 30-35%. Left  ventricular systolic function is moderately reduced. There is moderate global  hypokinesia of the left ventricle. No evidence of left ventricular mass or  tumors.     Right Ventricle  The right ventricle is moderately dilated. The right ventricular systolic  function is moderate to severely reduced.     Atria  The left atrium is moderately dilated. The right atrium is moderately dilated.  There is no color Doppler evidence of an atrial shunt.     Mitral Valve  The mitral valve leaflets are mildly thickened. There is mild  (1+) mitral  regurgitation.     Tricuspid Valve  There is trace tricuspid regurgitation.     Aortic Valve  The aortic valve is trileaflet. No aortic regurgitation is present. No  hemodynamically significant valvular aortic stenosis.     Pulmonic Valve  There is mild (1+) pulmonic valvular regurgitation. Normal pulmonic valve  velocity.     Vessels  The aortic root is normal size. Normal size ascending aorta. Dilation of the  inferior vena cava is present with normal respiratory variation in diameter.  IVC diameter and respiratory changes fall into an intermediate range  suggesting an RA pressure of 8 mmHg.     Pericardium  There is no pericardial effusion.     Rhythm  The rhythm was sinus with wide QRS. The patient exhibited frequent PVCs.     ______________________________________________________________________________  MMode/2D Measurements & Calculations  IVSd: 1.2 cm  IVSs: 3.9 cm  LVIDd: 6.2 cm  LVPWd: 1.0 cm  LV mass(C)d: 290.6 grams  LV mass(C)dI: 123.8 grams/m2  Ao root diam: 3.8 cm     asc Aorta Diam: 3.5 cm  LVOT diam: 2.2 cm  LVOT area: 3.7 cm2  Ao root diam index Ht(cm/m): 2.1  Ao root diam index BSA (cm/m2): 1.6  Asc Ao diam index BSA (cm/m2): 1.5  Asc Ao diam index Ht(cm/m): 1.9  EF Biplane: 40.2 %  LA Volume (BP): 111.0 ml  LA Volume Index (BP): 47.2 ml/m2  RV Base: 4.7 cm     RWT: 0.33  TAPSE: 2.2 cm     Doppler Measurements & Calculations  MV E max tomi: 93.0 cm/sec  MV A max tomi: 39.0 cm/sec  MV E/A: 2.4  MV dec slope: 342.9 cm/sec2  MV dec time: 0.27 sec  Ao V2 max: 139.8 cm/sec  Ao max P.0 mmHg  Ao V2 mean: 101.5 cm/sec  Ao mean P.6 mmHg  Ao V2 VTI: 27.9 cm  CLARISSE(I,D): 2.4 cm2  CLARISSE(V,D): 2.4 cm2  LV V1 max PG: 3.2 mmHg  LV V1 max: 89.1 cm/sec  LV V1 VTI: 17.8 cm  SV(LVOT): 65.8 ml  SI(LVOT): 28.0 ml/m2  PA acc time: 0.11 sec  TR max tomi: 287.8 cm/sec  TR max P.1 mmHg  AV Tomi Ratio (DI): 0.64  CLARISSE Index (cm2/m2): 1.0  E/E' av.3  Lateral E/e': 15.7  Medial E/e': 24.9     RV S Tomi:  14.9 cm/sec     ______________________________________________________________________________  Report approved by: Kumar Reyes 06/19/2024 11:44 AM

## 2024-06-20 NOTE — DISCHARGE SUMMARY
[facility]    Discharge Summary  Hospitalist    Date of Admission:  6/12/2024  Date of Discharge:  6/20/2024  2:43 PM  Provider:  Anahi Mackey, DO    Discharge Diagnoses   Acute ischemic CVA of posterior left MCA - presumed 2/2 LV thrombus  Acute LV thrombus  Altered alertness, improved on keppra  New diagnosis of systolic CHF, unclear etiology  NSTEMI, suspect d/t demand ischemia  Pre-diabetes    Other medical issues:  No past medical history on file.    History of Present Illness   Cirilo Gardner is an 63 year old male who presented with confusion and expressive/receptive language deficits.  Please see the admission history and physical for full details.    Hospital Course   Cirilo Gardner was admitted on 6/12/2024.  The following problems were addressed during his hospitalization:     Acute ischemic CVA of posterior left MCA presumed embolic in the setting of LV thrombus    64 yo male with no previous known PMH who presents to the ED with sudden onset of confusion and language difficulties.  Imaging revealed left hemispheric infarcts, presumed embolic.  Stroke neuro consulted. Echo revealed LV thrombus; he was started on subcutaneous lovenox and warfarin.  Cardiology transitioned to eliquis prior to discharge and he was tolerating this.      He was found to have evidence of L vertebral occlusion at origin on CTA.    On 6/17 he started having new symptoms and code stroke was called.  MRI with evidnec of new L anterior borderzone infarct.  He continued to have fluctuating neuro exam on 6/18 that was out of proportion to new infarct burden.  Neuro unclear if symptoms were d/t new ischemia or localization seizures.  He was started on keppra with improvement of symptoms even though no clear seizures captured on EEG.    Neuro recommends that he continue on keppra at time of discharge until clinic follow-up.      LDL was 82; started on atorvastatin 40mg/day with goal LDL  40-70.      2.  New onset systolic CHF, unclear etiology        Hypokalemia         He was found to have new diagnosis of CM with severe LV systolic dysfunction, EF 15-20% and LV apical thrombus on ECHO. Cardiology consulted.  He was started on medical management that included carvediol, spironolactone, jardiance, lasix.  He was actively diuresed with IV lasix for several days and transitioned to po lasix on day of discharge.  SBP were low normal (100's) and so ACEI not added at time of discharge.     He was unable to complete chest CTA ordered d/t inability to hold his arms above head for needed time required for that test.  CT coronary calcium score was reported at 23; cardiology felt that this placed him at very low risk for severe multivessel CAD.  With this assessment there was no need to stop his needed anticoagulation for an inpatient coronary angiogram.  He will need further ischemic work-up with cardiology in the outpt setting.    He did have some hypokalemia that required replacement with close monitoring in the setting of daily spironolactone.  He was discharged with three days of supplemental potassium and will have a BMP in the next week.        Significant Results and Procedures       Pending Results   Unresulted Labs Ordered in the Past 30 Days of this Admission       No orders found from 5/13/2024 to 6/13/2024.            Code Status   Full Code       Primary Care Physician   No primary care provider on file.    Physical Exam   Temp: 97.7  F (36.5  C) Temp src: Oral BP: 102/64 Pulse: 67   Resp: 18 SpO2: 94 % O2 Device: None (Room air)    Vitals:    06/18/24 0759 06/19/24 0554 06/20/24 1145   Weight: 117.4 kg (258 lb 12.8 oz) 114.2 kg (251 lb 12.8 oz) 114.5 kg (252 lb 6.4 oz)     Vital Signs with Ranges  Temp:  [97.3  F (36.3  C)-98.5  F (36.9  C)] 97.7  F (36.5  C)  Pulse:  [63-72] 67  Resp:  [15-18] 18  BP: ()/(56-93) 102/64  SpO2:  [91 %-96 %] 94 %  I/O last 3 completed shifts:  In: 1320  [P.O.:1320]  Out: 2350 [Urine:2350]    GEN:  Alert, lying on his right side in bed, opens eyes to voice, appears comfortable  CV:  somewhat distant but regular rate and rhythm, no loud murmur to ausc.  S1 + S2 noted,   LUNGS:  Clear to auscultation ant/post bilaterally.  No rales/rhonchi/wheezing auscultated bilaterally but diminished air exchange noted at the bases bilaterally.  No costal retractions bilaterally.  Symmetric chest rise on inhalation noted.  EXT:  No edema or cyanosis bilaterally.    Discharge Disposition   Discharged to rehabilitation facility    Consultations This Hospital Stay   NEUROLOGY IP STROKE CONSULT  SPEECH LANGUAGE PATH ADULT IP CONSULT  PHARMACY IP CONSULT  PHARMACY IP CONSULT  PHARMACY IP CONSULT  PHYSICAL THERAPY ADULT IP CONSULT  OCCUPATIONAL THERAPY ADULT IP CONSULT  REHAB ADMISSIONS LIAISON IP CONSULT  CARE MANAGEMENT / SOCIAL WORK IP CONSULT  PHARMACY IP CONSULT  CARDIOLOGY IP CONSULT  CARE MANAGEMENT / SOCIAL WORK IP CONSULT  PHARMACY LIAISON FOR MEDICATION COVERAGE CONSULT  PHARMACY TO DOSE WARFARIN  CORE CLINIC EVALUATION IP CONSULT  PHARMACY LIAISON FOR MEDICATION COVERAGE CONSULT  PHYSICAL THERAPY ADULT IP CONSULT  OCCUPATIONAL THERAPY ADULT IP CONSULT  SPEECH LANGUAGE PATH ADULT IP CONSULT  SMOKING CESSATION PROGRAM IP CONSULT    Time Spent on this Encounter   IAnahi DO, personally saw the patient today and spent greater than 30 minutes discharging this patient.    Discharge Orders      Basic metabolic panel     N terminal pro BNP outpatient     Adult Sleep Eval & Management Referral      Follow-Up with Cardiology ANDREW      General info for SNF    Length of Stay Estimate: Short Term Care: Estimated # of Days <30  Condition at Discharge: Improving  Level of care:skilled   Rehabilitation Potential: Good  Admission H&P remains valid and up-to-date: Yes  Recent Chemotherapy: N/A  Use Nursing Home Standing Orders: Yes     Mantoux instructions    Give two-step  Alan (PPD) Per Facility Policy Yes     Follow Up and recommended labs and tests    Follow up with group home physician.  The following labs/tests are recommended: BMP 6/24/24 to review potassium.  F/up with stroke neuro in 6-8 wks for hospital f/up and keppra dosing  F/up CORE clinic (cards) in 2 wks (as ordered by cards)     Reason for your hospital stay    You were admitted with acute CVA and acute CHF     Glucose monitor nursing POCT    Before meals and at bedtime     Daily weights    Call Provider for weight gain of more than 2 pounds per day or 5 pounds per week.     Activity - Up with nursing assistance     Full Code     Physical Therapy Adult Consult    Evaluate and treat as clinically indicated.    Reason:  post CVA weakness     Occupational Therapy Adult Consult    Evaluate and treat as clinically indicated.    Reason:  post CVA weakness     Speech Language Path Adult Consult    Evaluate and treat as clinically indicated.    Reason:  expressive aphasia post-CVA     Fall precautions     Diet    Follow this diet upon discharge:      Modified carb, low sat fat      Fluid restriction 2000 ML FLUID      Combination Diet Thin Liquids (level 0) (No straws); Easy to Chew (level 7); Thin Liquids (level 0) (No straws)     Stroke Hospital Follow Up (for neurologist use only)    [facility] will call you to coordinate care as prescribed by your provider. If you don t hear from a representative within 2 business days, please call [phone].       [facility] PATCH MAIL OUT     Discharge Medications   Current Discharge Medication List        START taking these medications    Details   apixaban ANTICOAGULANT (ELIQUIS) 5 MG tablet Take 1 tablet (5 mg) by mouth 2 times daily    Associated Diagnoses: Acute CVA (cerebrovascular accident) (H); LV (left ventricular) mural thrombus      atorvastatin (LIPITOR) 40 MG tablet Take 1 tablet (40 mg) by mouth every evening    Associated Diagnoses: Cardiomyopathy, unspecified  type (H)      carvedilol (COREG) 25 MG tablet Take 0.5 tablets (12.5 mg) by mouth 2 times daily (with meals) HOLD If sbp <110 or HR <60    Associated Diagnoses: Cardiomyopathy, unspecified type (H)      empagliflozin (JARDIANCE) 10 MG TABS tablet Take 1 tablet (10 mg) by mouth daily    Associated Diagnoses: Cardiomyopathy, unspecified type (H)      furosemide (LASIX) 40 MG tablet Take 1 tablet (40 mg) by mouth daily Hold if sbp <100    Associated Diagnoses: Cardiomyopathy, unspecified type (H)      !! insulin aspart (NOVOLOG PEN) 100 UNIT/ML pen Inject 1-7 Units Subcutaneous 3 times daily (before meals) Correction Scale - MEDIUM INSULIN RESISTANCE DOSING     Do Not give Correction Insulin if Pre-Meal BG less than 140.   For Pre-Meal  - 189 give 1 unit.   For Pre-Meal  - 239 give 2 units.   For Pre-Meal  - 289 give 3 units.   For Pre-Meal  - 339 give 4 units.   For Pre-Meal - 389 give 5 units.   For Pre-Meal -439 give 6 units  For Pre-Meal BG greater than or equal to 440 give 7 units.   To be given with prandial insulin, and based on pre-meal blood glucose. Administering insulin within 5 minutes of the start of the meal is ideal. Administer insulin no more than 30 minutes after the start of the meal, unless directed otherwise by provider.     Notify provider if glucose greater than or equal to 350 mg/dL after administration of correction dose.    Associated Diagnoses: Glucose intolerance      !! insulin aspart (NOVOLOG PEN) 100 UNIT/ML pen Inject 1-5 Units Subcutaneous at bedtime MEDIUM INSULIN RESISTANCE DOSING    Do Not give Bedtime Correction Insulin if BG less than  200.   For  - 249 give 1 units.   For  - 299 give 2 units.   For  - 349 give 3 units.   For  -399 give 4 units.   For BG greater than or equal to 400 give 5 units.  Notify provider if glucose greater than or equal to 350 mg/dL after administration of correction dose.    Associated Diagnoses:  Glucose intolerance      levETIRAcetam (KEPPRA) 750 MG tablet Take 2 tablets (1,500 mg) by mouth 2 times daily    Associated Diagnoses: Acute CVA (cerebrovascular accident) (H); Confusion      potassium chloride (KLOR-CON) 20 MEQ packet Take 20 mEq by mouth daily for 3 days    Associated Diagnoses: Hypokalemia      sacubitril-valsartan (ENTRESTO) 24-26 MG per tablet Take 1 tablet by mouth 2 times daily HOLD if sbp <105    Associated Diagnoses: Cardiomyopathy, unspecified type (H)      spironolactone (ALDACTONE) 25 MG tablet Take 1 tablet (25 mg) by mouth daily HOLD if sbp <115    Associated Diagnoses: Cardiomyopathy, unspecified type (H)       !! - Potential duplicate medications found. Please discuss with provider.        Allergies   No Known Allergies  Data   Recent Labs   Lab 06/17/24  1808 06/16/24  0731   WBC 8.7 8.2   HGB 17.0 15.3   HCT 50.2 47.2   MCV 94 97    310     Recent Labs   Lab 06/20/24  1407 06/20/24  1150 06/20/24  0810 06/20/24  0756 06/20/24  0405 06/19/24  2357 06/19/24  0828 06/19/24  0819 06/18/24  0803 06/18/24  0755   NA  --   --  143  --   --   --   --  142  --  142   POTASSIUM 3.6  --  3.2*  --   --  3.3*  --  2.9*  --  3.5   CHLORIDE  --   --  103  --   --   --   --  102  --  105   CO2  --   --  26  --   --   --   --  27  --  24   ANIONGAP  --   --  14  --   --   --   --  13  --  13   GLC  --  144* 103* 105*   < >  --    < > 111*   < > 96   BUN  --   --  14.6  --   --   --   --  15.0  --  17.9   CR  --   --  0.88  --   --   --   --  0.87  --  0.94   GFRESTIMATED  --   --  >90  --   --   --   --  >90  --  >90   PARISH  --   --  9.1  --   --   --   --  9.3  --  9.0    < > = values in this interval not displayed.     7-Day Micro Results       No results found for the last 168 hours.          Recent Labs   Lab 06/20/24  1407 06/20/24  1150 06/20/24  0810 06/20/24  0756 06/20/24  0405 06/19/24  2357 06/19/24  0828 06/19/24  0819 06/18/24  0803 06/18/24  0755   NA  --   --  143  --   --    "--   --  142  --  142   POTASSIUM 3.6  --  3.2*  --   --  3.3*  --  2.9*  --  3.5   CHLORIDE  --   --  103  --   --   --   --  102  --  105   CO2  --   --  26  --   --   --   --  27  --  24   ANIONGAP  --   --  14  --   --   --   --  13  --  13   GLC  --  144* 103* 105*   < >  --    < > 111*   < > 96   BUN  --   --  14.6  --   --   --   --  15.0  --  17.9   CR  --   --  0.88  --   --   --   --  0.87  --  0.94   GFRESTIMATED  --   --  >90  --   --   --   --  >90  --  >90   PARISH  --   --  9.1  --   --   --   --  9.3  --  9.0   MAG  --   --  1.9  --   --   --   --  2.0  --   --     < > = values in this interval not displayed.     No results for input(s): \"AST\", \"ALT\", \"GGT\", \"ALKPHOS\", \"BILITOTAL\", \"BILICONJ\", \"BILIDIRECT\", \"MAGO\" in the last 168 hours.    Invalid input(s): \"BILIRUBININDIRECT\"  Recent Labs   Lab 06/19/24  0819 06/18/24  0755 06/17/24  1808   INR 1.41* 1.28* 1.19*     No results for input(s): \"CHOL\", \"HDL\", \"LDL\", \"TRIG\", \"CHOLHDLRATIO\" in the last 168 hours.  Results for orders placed or performed during the hospital encounter of 06/12/24   CT Head w/o Contrast    Narrative    CT OF THE HEAD WITHOUT CONTRAST June 12, 2024 2:16 PM     HISTORY: Altered mental status.    TECHNIQUE: 5 mm thick axial CT images of the head were acquired  without IV contrast material. Radiation dose for this scan was reduced  using automated exposure control, adjustment of the mA and/or kV  according to patient size, or iterative reconstruction technique.    COMPARISON: None.    FINDINGS: There is moderate diffuse cerebral volume loss. There are  subtle patchy areas of decreased density in the cerebral white matter  bilaterally that are consistent with sequela of chronic small vessel  ischemic disease.     The ventricles and basal cisterns are within normal limits in  configuration given the degree of cerebral volume loss. There is no  midline shift. There are no extra-axial fluid collections.     No intracranial hemorrhage, " mass or recent infarct.    The visualized paranasal sinuses are well-aerated. There is no  mastoiditis. There are no fractures of the visualized bones.      Impression    IMPRESSION: Diffuse cerebral volume loss and cerebral white matter  changes consistent with chronic small vessel ischemic disease. No  evidence for acute intracranial pathology.        JONATHON OBRIEN MD         SYSTEM ID:  [id number]   CTA Head Neck with Contrast    Addendum: 6/12/2024    Addendum: After reviewing the perfusion images that demonstrate a  probable infarct at the temporo-occipital junction on the left, there  is decreased arborization of the distal M3 branches of the inferior  division of the left middle cerebral artery possibly representing  multiple thromboembolic occlusions.    This imaging study was discussed with the ordering provider, Dr. TAMMY VANESSA, by Dr. Obrien on 6/12/2024 2:45 PM.    JONATHON OBRIEN MD         SYSTEM ID:  [id number]      Narrative    CT ANGIOGRAM OF THE HEAD AND NECK WITHOUT AND WITH CONTRAST June 12, 2024 2:20 PM     HISTORY: Altered mental status.    TECHNIQUE: Precontrast localizing scans were followed by CT  angiography with an injection of 67mL isovue-370 nonionic intravenous  contrast material with scans through the head and neck. Images were  transferred to a separate 3-D workstation where multiplanar  reformations and 3-D images were created. Estimates of carotid  stenoses are made relative to the distal internal carotid artery  diameters except as noted. Radiation dose for this scan was reduced  using automated exposure control, adjustment of the mA and/or kV  according to patient size, or iterative reconstruction technique.     COMPARISON: None.    FINDINGS:   Neck CTA: The common carotid arteries bilaterally are widely patent.  There is short segment mild narrowing (less than 50% luminal diameter  stenosis) at the right internal carotid origin that could be caused by  noncalcified plaque. The  cervical internal carotid arteries  bilaterally are tortuous but are otherwise patent without additional  areas of narrowing. The left vertebral artery is not filling; this is  of uncertain etiology or age. Acute occlusion of the left vertebral  artery is not excluded. The right vertebral artery is patent without  stenosis.    Head CTA: The basilar, bilateral intracranial distal internal carotid,  bilateral anterior cerebral, bilateral middle cerebral and bilateral  posterior cerebral arteries are patent and unremarkable.      Impression    IMPRESSION:  1. Age-indeterminate lack of flow in the left vertebral artery that is  of uncertain etiology. Acute occlusion cannot be excluded.  2. Mild atherosclerotic narrowing (less than 50% luminal diameter  stenosis) at the right internal carotid origin.  3. Otherwise, normal neck and head CTA.      JONATHON ACOSTA MD         SYSTEM ID:  [id number]   CT Head Perfusion w Contrast - For Tier 2 Stroke    Narrative    CT BRAIN PERFUSION 6/12/2024 2:35 PM    COMPARISON: Head CT same day, head and neck CT angiogram same day.    HISTORY: Altered mental status.    TECHNIQUE: Time sequential axial CT images of the head were acquired  during the administration of intravenous contrast (50mL Isovue-370).  CTA images of the [place] as well as color perfusion maps of  the brain were created from this time sequential axial source data.      Impression    IMPRESSION: There is a late arrival of the contrast bolus to the  posterolateral aspect of the left cerebral hemisphere involving  portions of the left temporal and left occipital lobes. This  corresponds to an area of decreased cerebral blood flow and decreased  cerebral blood volume suggesting infarct. There is questionable loss  of gray-white differentiation at the same location on head CT but head  CT is mildly limited by artifact. No other perfusion defects.      Radiation dose for this scan was reduced using automated  exposure  control, adjustment of the mA and/or kV according to patient size, or  iterative reconstruction technique    JONATHON ACOSTA MD         SYSTEM ID:  [id number]   US Carotid Bilateral    Narrative    BILATERAL CAROTID ULTRASOUND   6/14/2024 10:57 AM     HISTORY:  Left ICA plaque, stroke.    COMPARISON: CTA 6/12/2024    RIGHT CAROTID FINDINGS:  Plaque in the right common carotid, carotid  bulb and internal carotid artery.  Right ICA PSV:  95 cm/sec.  Right ICA EDV:  33 cm/sec.  Right ICA/CCA PSV Ratio:  1.55    These indicate less than 50% diameter stenosis of the right ICA.    Right Vertebral: Antegrade flow.   Right ECA: Antegrade flow.     LEFT CAROTID FINDINGS:  Plaque in the carotid bulb and internal  carotid artery.  Left ICA PSV:  60 cm/sec.  Left ICA EDV:  24 cm/sec.  Left ICA/CCA PSV Ratio:  1.06    These indicate less than 50% diameter stenosis of the left ICA.    Left Vertebral: Occluded, unchanged.  Left ECA: Antegrade flow.     Causes of Decreased Accuracy:   None.       Impression    IMPRESSION:    1. Less than 50% diameter stenosis of the right ICA relative to the  distal ICA diameter.   2. Less than 50% diameter stenosis of the left ICA relative to the  distal ICA diameter.   3. Occlusion of the left vertebral artery, unchanged when compared to  the prior CT from 6/12/2024.    CECELIA MCCLENDON DO         SYSTEM ID:  [id number]   MR Brain w/o Contrast     Value    Radiologist flags Acute intracranial hemorrhage (AA)    Narrative    EXAM: MR BRAIN W/O CONTRAST  LOCATION: [facility]  DATE: 6/14/2024    INDICATION: Suspected acute CVA  COMPARISON: CTA head and neck 6/12/2024  TECHNIQUE: Diffusion-weighted sequences and sagittal T1-weighted images only were obtained. Patient was not able to complete the study.    FINDINGS:  INTRACRANIAL CONTENTS: There is a 9 x 3.4 cm (AP by TR) zone of restricted diffusion at the left temporal/occipital lobes with internal petechial  hemorrhage. Cytotoxic edema produces mild mass effect on the trigone and atria of the left lateral   ventricle. There is a 3.5 x 2.6 cm (AP by TR) zone of restricted diffusion in the parasagittal left frontal lobe. No midline shift. No hydrocephalus.        Impression    IMPRESSION:  1.  Moderate-sized evolving infarct in the left temporal/occipital lobes with petechial hemorrhage.  2.  Smaller zone of evolving infarction in the parasagittal left frontal lobe.  3.  Please note that the patient was not able to complete the exam and diffusion and sagittal T1 sequences only are available for interpretation.      [Critical Result: Acute intracranial hemorrhage]    Finding was identified on 6/14/2024 2:34 AM CDT.     1.  Dr. Yarbrough was contacted by me on 6/14/2024 3:15 AM CDT and verbalized understanding of the critical result.    CT Head w/o Contrast    Narrative    CT SCAN OF THE HEAD WITHOUT CONTRAST   6/14/2024 8:55 AM     HISTORY: Left temporal/occipital/frontal infarcts with petechial  hemorrhage, on heparin therapy; assess extend of petechial hemorrhage.    TECHNIQUE:  Axial images of the head and coronal reformations without  IV contrast material. Radiation dose for this scan was reduced using  automated exposure control, adjustment of the mA and/or kV according  to patient size, or iterative reconstruction technique.    COMPARISON: Brain MR from earlier the same day. Head CT 6/12/2024.    FINDINGS: Evolving infarct again seen in the left temporal/occipital  region. Infarct shows loss of gray-white matter differentiation which  is new since 6/12/2024. There is some hyperdensity within the area of  infarct compatible with petechial hemorrhage, also seen on brain MR  from earlier today. There is also small amount of subarachnoid  hemorrhage in the left temporal and occipital region. Mild regional  edema in the area of infarct with crowding of the cerebral sulci.  Slight mass effect on the posterior left lateral  ventricle. No  evidence of hydrocephalus or ventricular entrapment. Additional  infarct in the left frontal lobe with slight loss of gray-white matter  differentiation, much better seen on MR. Basal cisterns remain patent.  Moderate diffuse parenchymal volume loss. Periventricular white matter  hypodensities are nonspecific, but most likely related to chronic  microvascular ischemic disease.    Mucosal thickening in the inferior left maxillary sinus. The bony  calvarium and bones of the skull base appear intact.       Impression    IMPRESSION:     1. Evolving infarct in the left temporal/occipital region with areas  of petechial hemorrhage. Mild mass effect on the posterior left  lateral ventricle without evidence of hydrocephalus or ventricular  entrapment.   2. Additional infarct in the left frontal lobe with slight loss of  gray-white matter differentiation. This is better seen on MR.  3. Small amount subarachnoid hemorrhage in the left temporal and  occipital region.     EDIN WORRELL MD         SYSTEM ID:  [id number]   CT Head w/o Contrast    Narrative    EXAM: CT HEAD W/O CONTRAST, CTA HEAD NECK W CONTRAST  LOCATION: [facility]  DATE/TIME: 6/17/2024 5:18 PM CDT    INDICATION: Code Stroke; right-sided weakness, facial droop, expressive aphasia  COMPARISON:  MRI brain 6/14/2024, CTA head and neck 6/12/2024.  CONTRAST: 67 mL Isovue 370  TECHNIQUE: Head and neck CT angiogram with IV contrast. Noncontrast head CT followed by axial helical CT images of the head and neck vessels obtained during the arterial phase of intravenous contrast administration. Axial 2D reconstructed images and   multiplanar 3D MIP reconstructed images of the head and neck vessels were performed by the technologist. Dose reduction techniques were used. All stenosis measurements made according to NASCET criteria unless otherwise specified.    FINDINGS:   NONCONTRAST HEAD CT:   INTRACRANIAL CONTENTS: Evolving subacute  left anterior and posterior border zone infarcts with redemonstration of left temporal petechial hemorrhage. Trace left temporal subarachnoid hemorrhage. No maged hematoma formation. Mild local mass effect without   significant midline shift. No hydrocephalus. No new territory of involvement detected.    VISUALIZED ORBITS/SINUSES/MASTOIDS: No intraorbital abnormality.  Left maxillary sinus mucosal disease. No middle ear or mastoid effusion.    BONES/SOFT TISSUES: No acute abnormality.    HEAD CTA:  ANTERIOR CIRCULATION: No stenosis/occlusion, aneurysm, or high flow vascular malformation. Standard [place] anatomy.    POSTERIOR CIRCULATION: Redemonstration of left vertebral artery occlusion with minimal retrograde filling of the distal V4. No aneurysm or high flow vascular malformation.    DURAL VENOUS SINUSES: Not well evaluated on a technical basis.    NECK CTA:  RIGHT CAROTID: No measurable stenosis or dissection.    LEFT CAROTID: Soft and calcified atherosclerotic plaque results in less than 25% stenosis in the proximal ICA. No dissection.    VERTEBRAL ARTERIES: No focal stenosis or dissection on the right. Unchanged left vertebral artery occlusion.    AORTIC ARCH: Bovine origin left common carotid artery. No significant stenosis at the origin of the great vessels.    NONVASCULAR STRUCTURES:  Right pleural effusion.      Impression    IMPRESSION:   HEAD CT:  1.  Evolving subacute left anterior and posterior border zone infarcts with redemonstration of left frontoparietal petechial hemorrhage.  2.  Trace left temporal subarachnoid hemorrhage.  3.  No maged hematoma, progressive mass effect or convincing new territory of involvement.    HEAD AND NECK CTA:   No significant change since 6/12/2024.  1.  Unchanged occluded left vertebral artery from the origin to the distal V4 segment.  2.  The remaining major vessels of the head and neck are patent without flow-limiting stenosis.    Dr. Glover was contacted by  me on 6/17/2024 5:36 PM CDT with the preliminary report.   CTA Head Neck with Contrast    Narrative    EXAM: CT HEAD W/O CONTRAST, CTA HEAD NECK W CONTRAST  LOCATION: [facility]  DATE/TIME: 6/17/2024 5:18 PM CDT    INDICATION: Code Stroke; right-sided weakness, facial droop, expressive aphasia  COMPARISON:  MRI brain 6/14/2024, CTA head and neck 6/12/2024.  CONTRAST: 67 mL Isovue 370  TECHNIQUE: Head and neck CT angiogram with IV contrast. Noncontrast head CT followed by axial helical CT images of the head and neck vessels obtained during the arterial phase of intravenous contrast administration. Axial 2D reconstructed images and   multiplanar 3D MIP reconstructed images of the head and neck vessels were performed by the technologist. Dose reduction techniques were used. All stenosis measurements made according to NASCET criteria unless otherwise specified.    FINDINGS:   NONCONTRAST HEAD CT:   INTRACRANIAL CONTENTS: Evolving subacute left anterior and posterior border zone infarcts with redemonstration of left temporal petechial hemorrhage. Trace left temporal subarachnoid hemorrhage. No maged hematoma formation. Mild local mass effect without   significant midline shift. No hydrocephalus. No new territory of involvement detected.    VISUALIZED ORBITS/SINUSES/MASTOIDS: No intraorbital abnormality.  Left maxillary sinus mucosal disease. No middle ear or mastoid effusion.    BONES/SOFT TISSUES: No acute abnormality.    HEAD CTA:  ANTERIOR CIRCULATION: No stenosis/occlusion, aneurysm, or high flow vascular malformation. Standard [place] anatomy.    POSTERIOR CIRCULATION: Redemonstration of left vertebral artery occlusion with minimal retrograde filling of the distal V4. No aneurysm or high flow vascular malformation.    DURAL VENOUS SINUSES: Not well evaluated on a technical basis.    NECK CTA:  RIGHT CAROTID: No measurable stenosis or dissection.    LEFT CAROTID: Soft and calcified  atherosclerotic plaque results in less than 25% stenosis in the proximal ICA. No dissection.    VERTEBRAL ARTERIES: No focal stenosis or dissection on the right. Unchanged left vertebral artery occlusion.    AORTIC ARCH: Bovine origin left common carotid artery. No significant stenosis at the origin of the great vessels.    NONVASCULAR STRUCTURES:  Right pleural effusion.      Impression    IMPRESSION:   HEAD CT:  1.  Evolving subacute left anterior and posterior border zone infarcts with redemonstration of left frontoparietal petechial hemorrhage.  2.  Trace left temporal subarachnoid hemorrhage.  3.  No maged hematoma, progressive mass effect or convincing new territory of involvement.    HEAD AND NECK CTA:   No significant change since 6/12/2024.  1.  Unchanged occluded left vertebral artery from the origin to the distal V4 segment.  2.  The remaining major vessels of the head and neck are patent without flow-limiting stenosis.    Dr. Glover was contacted by me on 6/17/2024 5:36 PM CDT with the preliminary report.   CT Head Perfusion w Contrast    Narrative    EXAM: CT HEAD PERFUSION W CONTRAST  LOCATION: [facility]  DATE: 6/17/2024    INDICATION: Code Stroke; right-sided weakness, facial droop, expressive aphasia  COMPARISON: Same day CTs, MRI brain 6/14/2024, CTA head and neck 6/12/2024.  TECHNIQUE: CT cerebral perfusion was performed utilizing a second contrast bolus. Perfusion data were post processed with generation of standard perfusion maps and estimation of ischemic/infarcted volumes utilizing standard threshold values. Dose   reduction techniques were used.   CONTRAST: 50 mL Isovue 370    FINDINGS:   PERFUSION MAPS:  Examination degraded by motion. Within this limitation, no convincing new focal deficits in cerebral blood flow or volume to suggest ischemia/oligemia.    RAPID ANALYSIS:  CBF<30% volume: 0 mL  Tmax>6sec: 0 mL  Mismatch volume: 0 mL  Mismatch ratio: None       Impression    IMPRESSION:   Examination severely degraded by motion.  1.  No convincing new territory of involvement, as imaged.    Dr. Glover was contacted by me on 6/17/2024 5:36 PM CDT with the preliminary report.   MR Brain w/o & w Contrast    Narrative    EXAM: MR BRAIN W/O and W CONTRAST  LOCATION: [facility]  DATE: 6/17/2024    INDICATION: NEw right sided weakness and aphasia in patient with LV thrombus previously neuro intact with Left MCA stroke.  COMPARISON:  Same day CTs, brain MRI 6/14/2024.  CONTRAST: 12 mL Gadavist  TECHNIQUE: Routine multiplanar multisequence head MRI without and with intravenous contrast.    FINDINGS:  INTRACRANIAL CONTENTS:  New small area of acute infarct in the left superior and middle frontal gyri (anterior border zone) compared to 6/14/2024. Remaining area of infarct has not significantly changed. Interval progression of local mass effect in the   subacute left anterior and posterior border zone infarcts with similar appearance of petechial hemorrhage. No maged hematoma. No significant midline shift. No hydrocephalus. Normal position of the cerebellar tonsils.  The area of subacute infarct   demonstrates reactive enhancement as well as intrinsic T1 shortening.    SELLA: No abnormality accounting for technique.    OSSEOUS STRUCTURES/SOFT TISSUES: Normal marrow signal. The major intracranial vascular flow voids are maintained.     ORBITS: No abnormality accounting for technique.     SINUSES/MASTOIDS: Mild mucosal thickening scattered about the paranasal sinuses. Scattered fluid/membrane thickening in the right mastoid air cells. No apparent mass in the posterior nasopharynx or skull base.       Impression    IMPRESSION:  1.  New small left anterior border zone infarct along the posterior margin of prior infarct compared to 6/14/2024.  2.  Similar distribution of the remaining subacute infarcts with petechial hemorrhage in the left posterior border zone.  Slight progressed local mass effect without significant midline shift or maged hematoma formation.   XR Video Swallow with SLP or OT    Narrative    VIDEO SWALLOWING EVALUATION   2024 10:53 AM     HISTORY: CVA +new CVA assess for silent aspiration.    COMPARISON: None.    FLUOROSCOPY TIME: 1.5 minutes.     Number of cine runs: 10    FINDINGS:    Thin: Piecemeal. Otherwise normal.    Slightly thick: Piecemeal. Otherwise normal.    Mildly thick: Not administered.    Moderately thick: Not administered.    Puree: Normal    Semisolid: Normal    Regular: Normal    This study only includes the cervical esophagus.    JONATHON ACOSTA MD         SYSTEM ID:  [id number]   Radiologist Consult For Cardiology    Narrative    RADIOLOGIST CONSULT FOR CARDIOLOGY 2024 4:07 PM    HISTORY: LV thrombus, myocardial infarction.    COMPARISON: None.      Impression    IMPRESSION: Trace right greater than left pleural effusions. The  visualized lungs are clear. Minimal ascites in the upper abdomen.    DAHIANA WHEELER MD         SYSTEM ID:  [id number]   Echocardiogram Complete - For age > 60 yrs     Value    LVEF  15-20%    Narrative    [id number]  [id number]  [id number]  [id number]^SLY^SHRADDHA^DULCE MARIA     [facility]  Echocardiography Laboratory  [address] [place] [address]     Name: LIMA GILL  MRN: [id number]  : 1960  Study Date: 2024 02:46 PM  Age: 63 yrs  Gender: Male  Patient Location: [facility]  Reason For Study: Cerebrovascular Incident  Ordering Physician: SHRADDHA HEART  Performed By: Amisha Scott     BSA: 2.5 m2  Height: 72 in  Weight: 279 lb  HR: 99  BP: 174/112 mmHg  ______________________________________________________________________________  Procedure  Complete Portable Echo Adult. Optison (NDC #[id number]) given intravenously.  ______________________________________________________________________________  Interpretation Summary     The left ventricle is  borderline dilated.  There is mild to moderate concentric left ventricular hypertrophy.  The visual ejection fraction is 15-20%.  Severely decreased left ventricular systolic function  There is severe global hypokinesia of the left ventricle.  There is an apical left ventricular thrombus  LV apex clot 1.5 x 1.9 cm-non mobile  Moderately decreased right ventricular systolic function  There is moderate biatrial enlargement.  Right ventricular systolic pressure is elevated, consistent with moderate  pulmonary hypertension.  IVC diameter >2.1 cm collapsing <50% with sniff suggests a high RA pressure  estimated at 15 mmHg or greater.  The rhythm was sinus tachycardia.  Emergency finding called to RN station [phone]  ______________________________________________________________________________  Left Ventricle  The left ventricle is borderline dilated. There is mild to moderate concentric  left ventricular hypertrophy. The visual ejection fraction is 15-20%. Severely  decreased left ventricular systolic function. Grade II or moderate diastolic  dysfunction. There is severe global hypokinesia of the left ventricle. There  is an apical left ventricular thrombus. LV apex clot 1.5 x 1.9 cm-non mobile.     Right Ventricle  The right ventricle is normal size. Moderately decreased right ventricular  systolic function.     Atria  There is moderate biatrial enlargement.     Mitral Valve  There is mild mitral annular calcification. There is mild (1+) mitral  regurgitation.     Tricuspid Valve  There is mild (1+) tricuspid regurgitation. Right ventricular systolic  pressure is elevated, consistent with moderate pulmonary hypertension. The  right ventricular systolic pressure is approximated at 38.6 mmHg plus the  right atrial pressure. IVC diameter >2.1 cm collapsing <50% with sniff  suggests a high RA pressure estimated at 15 mmHg or greater.     Aortic Valve  The aortic valve is trileaflet with aortic valve sclerosis.     Pulmonic  Valve  The pulmonic valve is not well seen, but is grossly normal.     Vessels  The aortic root is normal size.     Pericardium  The pericardium appears normal.     Rhythm  The rhythm was sinus tachycardia.  ______________________________________________________________________________  MMode/2D Measurements & Calculations     IVSd: 1.3 cm  LVIDd: 5.6 cm  LVIDs: 4.7 cm  LVPWd: 1.5 cm  FS: 16.3 %  LV mass(C)d: 337.4 grams  LV mass(C)dI: 137.5 grams/m2  Ao root diam: 3.6 cm  LA dimension: 5.0 cm  asc Aorta Diam: 3.8 cm  LA/Ao: 1.4  LVOT diam: 2.2 cm  LVOT area: 3.9 cm2  Ao root diam index Ht(cm/m): 2.0  Ao root diam index BSA (cm/m2): 1.5  Asc Ao diam index BSA (cm/m2): 1.5  Asc Ao diam index Ht(cm/m): 2.1  LA Volume (BP): 135.0 ml     LA Volume Index (BP): 55.1 ml/m2  RWT: 0.53  TAPSE: 1.1 cm     Doppler Measurements & Calculations  MV E max tomi: 78.3 cm/sec  MV A max tomi: 38.2 cm/sec  MV E/A: 2.0  MV dec slope: 1011 cm/sec2  MV dec time: 0.08 sec  PA acc time: 0.08 sec  TR max tomi: 267.7 cm/sec  TR max P.6 mmHg  E/E' avg: 15.5  Lateral E/e': 10.1  Medial E/e': 21.0  RV S Tomi: 8.4 cm/sec     ______________________________________________________________________________  Report approved by: Kumar Saavedra 2024 05:15 PM         Echocardiogram Complete     Value    LVEF  30-35%    Narrative    [id number]  [id number]  [id number]  [id number]^SILVESTRE^ABDULLAHI     [facility]  [facility]  Echocardiography Laboratory  [address]  [address]  [place] [address]  Phone [phone]  Fax [phone]     Name: LIMA GILL  MRN: [id number]  : 1960  Study Date: 2024 10:10 AM  Age: 63 yrs  Gender: Male  Patient Location: [facility]  Reason For Study: CVA  Ordering Physician: ABDULLAHI RIVERS  Performed By: Veronica Rivas     BSA: 2.3 m2  Height: 72 in  Weight: 251 lb  HR: 73  BP: 151/71  mmHg  ______________________________________________________________________________  Procedure  Complete Portable Echo Adult. Optison (NDC #[id number]) given intravenously.  ______________________________________________________________________________  Interpretation Summary     The visual ejection fraction is 30-35%.  Left ventricular systolic function is moderately reduced.  No evidence of left ventricular mass or tumors  Compared to previously the left ventricular systolic function appears to be  marginally better. The left ventricle however measures larger on this study  than previously.  The right ventricular systolic function is moderate to severely reduced.  Grade III or advanced diastolic dysfunction.  The study was technically difficult. The study was technically limited.  ______________________________________________________________________________  Left Ventricle  The left ventricle is mildly dilated. There is normal left ventricular wall  thickness. Diastolic Doppler findings (E/E' ratio and/or other parameters)  suggest left ventricular filling pressures are increased. Grade III or  advanced diastolic dysfunction. The visual ejection fraction is 30-35%. Left  ventricular systolic function is moderately reduced. There is moderate global  hypokinesia of the left ventricle. No evidence of left ventricular mass or  tumors.     Right Ventricle  The right ventricle is moderately dilated. The right ventricular systolic  function is moderate to severely reduced.     Atria  The left atrium is moderately dilated. The right atrium is moderately dilated.  There is no color Doppler evidence of an atrial shunt.     Mitral Valve  The mitral valve leaflets are mildly thickened. There is mild (1+) mitral  regurgitation.     Tricuspid Valve  There is trace tricuspid regurgitation.     Aortic Valve  The aortic valve is trileaflet. No aortic regurgitation is present. No  hemodynamically significant valvular aortic  stenosis.     Pulmonic Valve  There is mild (1+) pulmonic valvular regurgitation. Normal pulmonic valve  velocity.     Vessels  The aortic root is normal size. Normal size ascending aorta. Dilation of the  inferior vena cava is present with normal respiratory variation in diameter.  IVC diameter and respiratory changes fall into an intermediate range  suggesting an RA pressure of 8 mmHg.     Pericardium  There is no pericardial effusion.     Rhythm  The rhythm was sinus with wide QRS. The patient exhibited frequent PVCs.     ______________________________________________________________________________  MMode/2D Measurements & Calculations  IVSd: 1.2 cm  IVSs: 3.9 cm  LVIDd: 6.2 cm  LVPWd: 1.0 cm  LV mass(C)d: 290.6 grams  LV mass(C)dI: 123.8 grams/m2  Ao root diam: 3.8 cm     asc Aorta Diam: 3.5 cm  LVOT diam: 2.2 cm  LVOT area: 3.7 cm2  Ao root diam index Ht(cm/m): 2.1  Ao root diam index BSA (cm/m2): 1.6  Asc Ao diam index BSA (cm/m2): 1.5  Asc Ao diam index Ht(cm/m): 1.9  EF Biplane: 40.2 %  LA Volume (BP): 111.0 ml  LA Volume Index (BP): 47.2 ml/m2  RV Base: 4.7 cm     RWT: 0.33  TAPSE: 2.2 cm     Doppler Measurements & Calculations  MV E max tomi: 93.0 cm/sec  MV A max tomi: 39.0 cm/sec  MV E/A: 2.4  MV dec slope: 342.9 cm/sec2  MV dec time: 0.27 sec  Ao V2 max: 139.8 cm/sec  Ao max P.0 mmHg  Ao V2 mean: 101.5 cm/sec  Ao mean P.6 mmHg  Ao V2 VTI: 27.9 cm  CLARISSE(I,D): 2.4 cm2  CLARISSE(V,D): 2.4 cm2  LV V1 max PG: 3.2 mmHg  LV V1 max: 89.1 cm/sec  LV V1 VTI: 17.8 cm  SV(LVOT): 65.8 ml  SI(LVOT): 28.0 ml/m2  PA acc time: 0.11 sec  TR max tomi: 287.8 cm/sec  TR max P.1 mmHg  AV Tomi Ratio (DI): 0.64  CLARISSE Index (cm2/m2): 1.0  E/E' av.3  Lateral E/e': 15.7  Medial E/e': 24.9     RV S Tomi: 14.9 cm/sec     ______________________________________________________________________________  Report approved by: Kumar Reyes 2024 11:44 AM         CT Calcium Screening    Narrative    Procedure: CT  "CALCIUM SCREENING   Examination Date: 6/18/2024 4:07 PM   Clinical Information: New cardiomyopathy. LV thrombus   Indication: New cardiomyopathy, left ventricular thrombus.  Ordering Provider: Dr Barraza  Quality of the study: Good    PROCEDURE: High-resolution, ECG synchronized multi-slice computed  tomography was performed with a Siemens Dual Source Flash scanner  without incident. Coronary calcification was analyzed using Coravin  calcium scoring software. Scan protocol was optimized to minimize  radiation exposure. The total radiation exposure was calculated to be  45 DLP and 0.73 mSv.    IMPRESSIONS:  1.  Mild coronary calcifications.  2.  The total Agatston calcium score is 23.4 placing the patient in  the 37th percentile when compared to age and gender matched control  group.  3.  Recommend aggressive risk factor modification.  4.  Please review Radiology report for incidental noncardiac findings  that will follow separately.     FINDINGS:    CORONARY ARTERY CALCIUM SCORES:   Total calcium score: 23.4  Left main coronary artery: 0  Left anterior descending coronary artery: 16.8  Circumflex coronary artery: 2.42  Right coronary artery: 4.24    BACKGROUND  A coronary artery calcium (CAC) score is a measurement of the amount  of calcium (hard plaque) in the walls of the arteries that supply the  heart muscle. Numerous studies have indicated that this test is a  reliable measure of risk for adverse cardiovascular events, such as  heart attack and stroke.    MANAGEMENT  The American Heart Association/American College of Cardiology  (AHA/ACC) 2018 Guideline on the Management of Blood Cholesterol  states: \"If CAC is zero, treatment with statin therapy may be withheld  or delayed, except in certain very high risk individuals such as  cigarette smokers, those with diabetes mellitus, and those with a  strong family history of premature atherosclerotic disease. A CAC  score of 1 to 99 favors statin therapy, especially " "in those 55 years  of age or older. For any patient, if the CAC score is greater than or  equal to100 Agatston units or greater than or equal to 75th  percentile, statin therapy is indicated unless otherwise deferred by  the outcome of clinician-patient risk discussion.\"    The Society of Cardiovascular CT (SCCT) CAC guideline recommends the  following:  CAC score 0: statin generally not recommended  CAC score 1-99: moderate intensity statin generally recommended  CAC score 100-299: moderate to high intensity statin + Aspirin 81mg  CAC score >300: high intensity statin + Aspirin 81mg    GENERAL RECOMMENDATIONS  Adoption and maintenance of a healthy lifestyle is recommended for all  people. This should include regular, appropriate exercise and  observance of a proper diet, to ensure balanced nutrition and weight  control. Tobacco use should be avoided. Cholesterol has been linked to  coronary atherosclerosis, and we strongly encourage adhering to the  recommendations of the 2018 ACC/AHA guidelines on the Management of  Blood Cholesterol. For primary prevention, these include calculation  of 10-year ASCVD risk and initiation of statin therapy based on  estimated ASCVD risk and LDL cholesterol. The BARTON risk score, which  combines traditional risk factors and CAC, is available online on the  BARTON website  (https://www.barton-nhlbi.org/MESACHDRisk/MesaRiskScore/RiskScore.aspx).  (Nancy RL, et al. J Am Aurelio Cardiol. 2015 Oct 13;66(15):1643-53.)    However note that these are general recommendations only, and as with  all such matters, the personal physician should be consulted regarding  recommendations appropriate for the individual. If further guidance is  needed, you can schedule an appointment with one of our Preventive  Cardiologists  ([email or web address]).    References:  1. 2018 AHA/ACC/AACVPR/AAPA/ABC/ACPM/ADA/AGS/APhA/ASPC/NLA/PCNA  Guideline on the Management of " Blood Cholesterol  2. CAC-DRS: Coronary Artery Calcium Data and Reporting System. An  expert consensus document of the Society of Cardiovascular Computed  Tomography (SCCT)  3. Tucker GROSS, et al. J Am Aurelio Cardiol. 2022 May 17;79(19):0181-2748;  associated calculator at https://www.cac-tools.com (used for  calculation of calcium score percentiles in patients ages 30-45)    THIERRY MAO MD         SYSTEM ID:  [id number]

## 2024-06-20 NOTE — PROGRESS NOTES
[facility]    Cardiology Progress Note    Primary Cardiologist: Dr. Sung    Date of Admission: 6/12/2024  Service Date: 06/20/2024     Summary:  Mr. Cirilo Gardner is a very pleasant 63 year old male with a past medical history of obesity and no other known medical history (has not seen a doctor in many years) who was admitted on 6/12/2024 after presenting with confusion and expressive/receptive aphasia along with a right visual cut and being found to have an acute left MCA stroke. Cardiology was consulted as the patient was found to have a new diagnosis of a cardiomyopathy with severe LV systolic dysfunction, ejection fraction 15-20% and LV apical thrombus on echocardiogram.    Interval History   No acute events overnight. Patient resting comfortably in recliner. Weight down 27 lbs since admission.  Net -1.0 L. K 3.2 this morning, replaced and redraw ordered. Creatinine remains stable at 0.88.    Telemetry: Sinus rhythm, HR 60s     Assessment:  Severe LV systolic dysfunction (newly diagnosed), ejection fraction 15-20%  - Etiology: Unclear at this time  - Fluid status: Challenging to assess due to body habitus.   - Diuretic regimen: None PTA.   - Ischemic evaluation: CT calcium score 6/18/2024- The total Agatston calcium score is 23.4 placing the patient in the 37th percentile when compared to age and gender matched control group with mild coronary calcifications     Guideline directed medical therapy (GDMT):  - Beta blocker: Carvedilol 25 mg BID  - ACEI/ARB/ARNI: Entresto 49-51 mg BID   - Aldactone antagonist: Spironolactone 25 mg daily   - SGLT2 inhibitor: Jardiance 10 mg once daily.    LV apical thrombus measuring 1.5 x 1.9 cm  Acute left MCA stroke likely due to #2  Severe expressive/receptive aphasia  Confusion  Mildly elevated troponin, likely demand  Obesity    Plan:   1.  In regards to ischemic evaluation, will hold on pursuing at this time. There is a low suspicion of  significant coronary artery disease with a mild calcium burden. If cardiomyopathy fails to improve with medical therapy, can reconsider coronary angiogram in the future after left ventricular LV thrombus resolves.   2. Continue cardiology medication regimen    -Eliquis 5 mg BID    -atorvastatin 40 mg daily    -carvedilol 25 mg BID    -jardiance 10 mg daily   -Entresto 49-51 mg BID    -spironolactone 25 mg daily    -potassium chloride 40 mEq daily   3. Start lasix 40 mg PO daily   4. Daily standing weight, strict I/O, daily BMP and repletion of electrolytes   5. Potassium lab redraw ordered   6. Follow up with cardiology ANDREW on June 28th   7. Cardiology will sign off. Please contact with further questions.     Plan of care was formulated under the direction and guidance of Dr. Barraza.         Ashley Yao PA-C  Physician Assistant   [facility] [place]  Pager: [phone]    Patient Active Problem List   Diagnosis    Confusion    Elevated troponin    Visual field cut    Acute CVA (cerebrovascular accident) (H)       Physical Exam   Temp: 97.3  F (36.3  C) Temp src: Oral BP: (!) 147/93 Pulse: 68   Resp: 16 SpO2: 92 % O2 Device: None (Room air)    Vitals:    06/17/24 0906 06/18/24 0759 06/19/24 0554   Weight: 123.8 kg (273 lb) 117.4 kg (258 lb 12.8 oz) 114.2 kg (251 lb 12.8 oz)     Vital Signs with Ranges  Temp:  [97.3  F (36.3  C)-98.5  F (36.9  C)] 97.3  F (36.3  C)  Pulse:  [65-72] 68  Resp:  [15-18] 16  BP: ()/(56-93) 147/93  SpO2:  [91 %-96 %] 92 %  I/O last 3 completed shifts:  In: 1320 [P.O.:1320]  Out: 2350 [Urine:2350]    General: Appears his stated age, well nourished, and in no acute distress, resting in recliner   Eyes: No scleral icterus.  HEENT: Neck supple. JVD not appreciated   Cardiovascular: Regular rate and rhythm, normal S1 and S2. No murmur, rub, or gallop.  Extremities: Moves all extremities well and symmetrically. There is at least 2+ lower extremity edema   Respiratory:  Breathing non-labored. Lungs clear to auscultation with no crackles or wheezes bilaterally.  Skin: No pallor or cyanosis.  Gastrointestinal: Non-distended.  Psych: Appropriate affect. Mentation normal.  Neurological: No gross focal deficits.    Medications   Current Facility-Administered Medications   Medication Dose Route Frequency Provider Last Rate Last Admin    medication instruction - No oral meds if patient didn't pass dysphagia screen   Does not apply Continuous PRN Cameron Frederick PA-C        Medication Instructions - Avoid dextrose in IV solutions.   Intravenous Continuous PRN Cameron Frederick PA-C         Current Facility-Administered Medications   Medication Dose Route Frequency Provider Last Rate Last Admin    apixaban ANTICOAGULANT (ELIQUIS) tablet 5 mg  5 mg Oral BID Steven Barraza MD   5 mg at 06/20/24 0839    atorvastatin (LIPITOR) tablet 40 mg  40 mg Oral QPM Radha Haney PA-C   40 mg at 06/19/24 2005    carvedilol (COREG) tablet 25 mg  25 mg Oral BID w/meals Ashley Yao PA-C   25 mg at 06/20/24 0839    [Held by provider] empagliflozin (JARDIANCE) tablet 10 mg  10 mg Oral Daily Margarita Marvin CNP   10 mg at 06/17/24 0837    insulin aspart (NovoLOG) injection (RAPID ACTING)  1-7 Units Subcutaneous TID AC Louisa Townsend DO   1 Units at 06/19/24 1220    insulin aspart (NovoLOG) injection (RAPID ACTING)  1-5 Units Subcutaneous At Bedtime Louisa Townsend DO        iopamidol (ISOVUE-370) solution 500 mL  500 mL Intravenous Once Steven Barraza MD        levETIRAcetam (KEPPRA) tablet 1,500 mg  1,500 mg Oral BID Anahi Mackey DO        potassium chloride (KLOR-CON) Packet 40 mEq  40 mEq Oral Daily Ashley Yao PA-C        sacubitril-valsartan (ENTRESTO) 49-51 MG per tablet 1 tablet  1 tablet Oral BID Ashley Yao PA-C   1 tablet at 06/20/24 0839    sodium chloride (PF) 0.9% PF flush 3 mL  3 mL Intracatheter Q8H Cameron Frederick  MAMI Laird   3 mL at 06/19/24 1054    sodium chloride (PF) 0.9% PF flush  mL   mL Intravenous Once Steven Barraza MD        spironolactone (ALDACTONE) tablet 25 mg  25 mg Oral Daily Ashley Yao PA-C   25 mg at 06/20/24 0839     Data   Recent Labs   Lab 06/17/24  1808 06/16/24  0731 06/15/24  0612   WBC 8.7 8.2 8.6   HGB 17.0 15.3 14.2   HCT 50.2 47.2 44.5   MCV 94 97 96    310 281     Recent Labs   Lab 06/20/24  0756 06/20/24  0405 06/19/24  2357 06/19/24  2208 06/19/24  0828 06/19/24  0819 06/18/24  0803 06/18/24  0755 06/17/24  2117 06/17/24  1808   NA  --   --   --   --   --  142  --  142  --  140   POTASSIUM  --   --  3.3*  --   --  2.9*  --  3.5  --  3.5   CHLORIDE  --   --   --   --   --  102  --  105  --  100   CO2  --   --   --   --   --  27  --  24  --  24   ANIONGAP  --   --   --   --   --  13  --  13  --  16*   * 116*  --  135*   < > 111*   < > 96   < > 126*   BUN  --   --   --   --   --  15.0  --  17.9  --  24.4*   CR  --   --   --   --   --  0.87  --  0.94  --  1.18*   GFRESTIMATED  --   --   --   --   --  >90  --  >90  --  69   PARISH  --   --   --   --   --  9.3  --  9.0  --  9.5    < > = values in this interval not displayed.        Please kindly note that this document was completed in part using Dragon voice recognition software. Although reviewed after completion, some word substitutions and typographical errors may occur. Please contact me if clarification is needed.

## 2024-06-20 NOTE — DISCHARGE INSTRUCTIONS
Your risk factors for stroke or TIA (transient ischemic attack):     Your Risk Factors Your Results Goals   [x] High blood pressure BP: 102/64 (06/20/24 1128) Less than 120/80   [x] Cholesterol          Total 6/13/2024: 135 mg/dL   Less than 150    Triglycerides   6/13/2024: 58 mg/dL Less than 150    LDL 6/13/2024: 82 mg/dL    Less than 70    HDL 6/13/2024: 41 mg/dL         Greater than 40 (men)  Greater than 50 (women)   [x] Diabetes                A1C 6/12/2024: 6.4 % Less than 5.7   [] Atrial fibrillation Atrial fibrillation noted on cardiac monitoring Manage per physician orders   [] Smoking/tobacco use   Tobacco Use      Smoking status: Not on file      Smokeless tobacco: Not on file   Quit smoking and tobacco   [x] Overweight Body mass index is 34.23 kg/m .  Less than 25     Other risk factors include: carotid (neck) artery disease, other heart diseases, prior stroke or TIA, poor diet, lack of exercise, and excessive alcohol consumption.     [x] Written stroke educational materials given to patient including:   - Learning about BE FAST: Stroke Warning Signs and Learning about Risk Factors for Stroke (Healthwise)        Know the warning signs and symptoms of stroke: BE FAST     B = Balance loss   E = Eyesight changes   F = Facial droop or numbness   A = Arm or leg weakness   S = Speech difficulty, slurred speech   T = Time to call 911 for help

## 2024-06-20 NOTE — PLAN OF CARE
Physical Therapy Discharge Summary    Reason for therapy discharge:    Discharged to transitional care facility.    Progress towards therapy goal(s). See goals on Care Plan in [facility] electronic health record for goal details.  Goals not met.  Barriers to achieving goals:   discharge from facility.    Therapy recommendation(s):    Continued therapy is recommended.  Rationale/Recommendations:  Pt moving CGA no AD for all mobility, impaired dynamic balance and SOB throughout all mobility. Below baseline IND no AD Would likely benefit from ARU to progress OT/SLP though PT needs can be addressed at TCU..

## 2024-06-20 NOTE — PROGRESS NOTES
Pt here for CVAs    Alert. Neuros unchanged. Discharging to TCU via [facility] wheelchair ride. Belongings sent with patient. Discharge gone over with brother, Pratik, and patient.

## 2024-06-20 NOTE — PROGRESS NOTES
Care Management Discharge Note    Discharge Date: 06/20/2024       Discharge Disposition: [facility]  Discharge Services: therapies, transport  Discharge Transportation: [facility] wheelchair     Private pay costs discussed: private room/amenity fees and transportation costs    Does the patient's insurance plan have a 3 day qualifying hospital stay waiver?  No    PAS Confirmation Code:  [id number]  Patient/family educated on Medicare website which has current facility and service quality ratings:  yes    Education Provided on the Discharge Plan:  yes  Persons Notified of Discharge Plans: patient, brother, bedside RN, facility   Patient/Family in Agreement with the Plan: yes    Handoff Referral Completed: No - unknown     Additional Information:  SW met with patient and spoke about recommendation for TCU. Patient is agreeable and would like to go to [facility] TCU. He struggled a bit to answer questions but said he is okay with a shared room there. SW updated them on room preference. Spoke to hospitalist and we are awaiting cardiology input today. SW messaged cardiology about potential for discharge today. All agreeable to sign off and patient will leave today. Left voicemail with brother about transport. Left voicemail with friend Jo-Ann letting her know about discharge. No one identified for transport so SW scheduled a ride. [facility] wheelchair is scheduled for pickup today between [id number] to go to [facility] TCU.     PAS-RR    D: Per DHS regulation, BRIANNA completed and submitted PAS-RR to [place] [employer] Direct Connect via the Senior LinkAge Line.  PAS-RR confirmation # is : [id number]    P: Further questions may be directed to Senior LinkAge Line at #[phone], option #4 for PAS-RR staff.    Orders faxed to facility at 1238. Spoke to patient's brother and confirmed the plan. Left address in the room for brother Pratik, as he was just getting the hospital.     Evelin Kruse, MSW, LGSW   Social  Work   [facility]

## 2024-06-20 NOTE — PLAN OF CARE
Speech Language Therapy Discharge Summary    Reason for therapy discharge:    Discharged to acute rehabilitation facility.    Progress towards therapy goal(s). See goals on Care Plan in [facility] electronic health record for goal details.  Goals not met.  Barriers to achieving goals:   discharge from facility.    Therapy recommendation(s):    Continued therapy is recommended.  Rationale/Recommendations:  Continue SLP needs for functional communication and dysphagia management. He discharged on IDDSI level 7 regular easy to chew foods and thin liquids no straws.

## 2024-06-20 NOTE — CONSULTS
[facility]    Received CORE Clinic Consult from JASON Burks.     Reviewed Cirilo's chart and note they are admitted for new CM, LV thrombus, and HFrEF. Echocardiogram shows LVEF 30-35%. This is their first admission in the past year for concerns of heart failure.     Given above information and LVEF </= 40%, Cirilo meets criteria for CORE follow up. [facility] En[facility] will [facility] once discharged labs prior to visit.     Bedside RN to do CHF education.       Future Appointments   Date Time Provider Department Center   6/20/2024  1:30 PM Mary Bae OTR [facility]   6/28/2024  8:15 AM PEREZ LAB [facility]   6/28/2024  8:40 AM [facility]       Please call with questions.      Meka Marie RN BSN  CORE Clinic RN Care Coordinator   [facility]   [phone]   CORE Clinic: Cardiomyopathy, Optimization, Rehabilitation, Education

## 2024-06-20 NOTE — PLAN OF CARE
Goal Outcome Evaluation:      Plan of Care Reviewed With: patient    Overall Patient Progress: improvingOverall Patient Progress: improving      PRIMARY Concern: L MCA stroke  SAFETY RISK Concerns (fall risk, behaviors, etc.): Aspiration and fall risk      Consults? (Pending/following, signed-off?) PT/OT /SW neurology and cardiology following  Where is patient from? Lives home ind  Other Important info for NEXT shift: pt is on K and mag replacement, K was replaced it has not be drawn called lab awaiting redraw   Anticipated DC date & active delays: TDB will need ARU   _____________________________________________________________________________  SUMMARY NOTE:  Orientation/Cognitive: Alert to self only. JASEN orientation d/t severe expressive and receptive aphasia. Slight R facial droop. RUE 4/5 strength. Able to follow simple commands   Mobility Level/Assist Equipment: SBA  Pain Management: Denies pain   Tele/VS/O2: Tele:SR w/ PACs  ABNL Lab/BG: K:2.9 (replaced)   Diet: Easy to chew thin liquids no straw 2000ML FR  Bowel/Bladder: Continent strict I&Os  Drains/Devices: PIV:S/L  Family would like to speak to the hospital tomorrow

## 2024-06-20 NOTE — PLAN OF CARE
Orientations: A/O self. Follows commands. Severe expressive/receptive aphasia with some improvement.   Vitals/Pain: VSS on RA. Pt denies pain  Tele: SR w/ PACs  Lines/Drains: PIV - sl  Skin/Wounds: Generally intact - jonh. LE edema  GI/: Continent. Voiding per bathroom.   Labs: Abnormal/Trends, Electrolyte Replacement. 3.3 K+ - pt has wolf oral potassium this am.  Ambulation/Assist: Up with SBA  Sleep Quality: Fair  Plan: No acute changes overnight. Neuros unchanged from prior assessments. Discharge planning to ARU when stable.      Goal Outcome Evaluation:  Plan of Care Reviewed With: patient  Overall Patient Progress: improvingOverall Patient Progress: improving

## 2024-06-21 ENCOUNTER — TELEPHONE (OUTPATIENT)
Dept: GERIATRICS | Facility: CLINIC | Age: 64
End: 2024-06-21
Payer: COMMERCIAL

## 2024-06-21 ENCOUNTER — DOCUMENTATION ONLY (OUTPATIENT)
Dept: CARDIOLOGY | Facility: CLINIC | Age: 64
End: 2024-06-21
Payer: COMMERCIAL

## 2024-06-21 ENCOUNTER — DOCUMENTATION ONLY (OUTPATIENT)
Dept: GERIATRICS | Facility: CLINIC | Age: 64
End: 2024-06-21
Payer: COMMERCIAL

## 2024-06-21 DIAGNOSIS — E87.6 HYPOKALEMIA: Primary | ICD-10-CM

## 2024-06-21 NOTE — TELEPHONE ENCOUNTER
Prior Authorization Retail Medication Request    Medication/Dose:   insulin aspart (NOVOLOG PEN) 100 UNIT/ML pen 1-7 Units, 3 TIMES DAILY BEFORE MEALS      Diagnosis and ICD code (if different than what is on RX):  Hypokalemia E87.6   New/renewal/insurance change PA/secondary ins. PA:  Previously Tried and Failed:  N/A  Rationale:  SEE DX    Insurance   Primary: [facility]   Insurance ID:  [id number]     KEY: [id number]    Pharmacy Information (if different than what is on RX)

## 2024-06-21 NOTE — PROGRESS NOTES
[facility]   Patient scheduled for CORE Enrollment post hospital 6/28/24 with MEGHAN Bailon. Faxed BMP and NtproBNP orders for new CORE Enrollment labs to be done 6/27, prior to 6/28 follow up to [facility] at fax# [phone].    Future Appointments   Date Time Provider Department Center   6/28/2024  8:40 AM Colton Mejias NP [facility]   9/10/2024 12:30 PM [facility]     Meka Marie, RN, BSN  06/21/24 at 10:06 AM

## 2024-06-24 ENCOUNTER — TRANSFERRED RECORDS (OUTPATIENT)
Dept: HEALTH INFORMATION MANAGEMENT | Facility: CLINIC | Age: 64
End: 2024-06-24

## 2024-06-24 ENCOUNTER — TRANSITIONAL CARE UNIT VISIT (OUTPATIENT)
Dept: GERIATRICS | Facility: CLINIC | Age: 64
End: 2024-06-24
Payer: COMMERCIAL

## 2024-06-24 VITALS
OXYGEN SATURATION: 90 % | BODY MASS INDEX: 39.18 KG/M2 | TEMPERATURE: 97.1 F | HEART RATE: 86 BPM | RESPIRATION RATE: 17 BRPM | DIASTOLIC BLOOD PRESSURE: 62 MMHG | WEIGHT: 249.6 LBS | SYSTOLIC BLOOD PRESSURE: 105 MMHG | HEIGHT: 67 IN

## 2024-06-24 DIAGNOSIS — I24.89 DEMAND ISCHEMIA (H): ICD-10-CM

## 2024-06-24 DIAGNOSIS — I10 BENIGN ESSENTIAL HYPERTENSION: ICD-10-CM

## 2024-06-24 DIAGNOSIS — R53.81 PHYSICAL DECONDITIONING: ICD-10-CM

## 2024-06-24 DIAGNOSIS — K59.00 CONSTIPATION, UNSPECIFIED CONSTIPATION TYPE: ICD-10-CM

## 2024-06-24 DIAGNOSIS — E66.01 CLASS 2 SEVERE OBESITY DUE TO EXCESS CALORIES WITH SERIOUS COMORBIDITY AND BODY MASS INDEX (BMI) OF 39.0 TO 39.9 IN ADULT (H): ICD-10-CM

## 2024-06-24 DIAGNOSIS — I24.0 LV (LEFT VENTRICULAR) MURAL THROMBUS WITHOUT MI (H): ICD-10-CM

## 2024-06-24 DIAGNOSIS — R41.0 CONFUSION: ICD-10-CM

## 2024-06-24 DIAGNOSIS — R73.03 PREDIABETES: ICD-10-CM

## 2024-06-24 DIAGNOSIS — I21.4 NSTEMI (NON-ST ELEVATED MYOCARDIAL INFARCTION) (H): ICD-10-CM

## 2024-06-24 DIAGNOSIS — I63.9 ISCHEMIC CEREBROVASCULAR ACCIDENT (CVA) (H): Primary | ICD-10-CM

## 2024-06-24 DIAGNOSIS — R53.1 GENERALIZED WEAKNESS: ICD-10-CM

## 2024-06-24 DIAGNOSIS — E66.812 CLASS 2 SEVERE OBESITY DUE TO EXCESS CALORIES WITH SERIOUS COMORBIDITY AND BODY MASS INDEX (BMI) OF 39.0 TO 39.9 IN ADULT (H): ICD-10-CM

## 2024-06-24 DIAGNOSIS — L03.113 CELLULITIS OF RIGHT UPPER EXTREMITY: ICD-10-CM

## 2024-06-24 DIAGNOSIS — I50.20 HEART FAILURE WITH REDUCED EJECTION FRACTION, NYHA CLASS III (H): ICD-10-CM

## 2024-06-24 PROCEDURE — 99310 SBSQ NF CARE HIGH MDM 45: CPT | Performed by: NURSE PRACTITIONER

## 2024-06-24 RX ORDER — CEPHALEXIN 500 MG/1
500 CAPSULE ORAL 4 TIMES DAILY
Qty: 20 CAPSULE | Refills: 0 | Status: SHIPPED | OUTPATIENT
Start: 2024-06-24 | End: 2024-06-29

## 2024-06-24 NOTE — PROGRESS NOTES
[facility] GERIATRICS    PRIMARY CARE PROVIDER AND CLINIC:  Physician No Ref-Primary, No address on file  Chief Complaint   Patient presents with    Hospital F/U      [place] Medical Record Number:  [id number]  Place of Service where encounter took place:  [facility] (TCU) [[id number]]    Cirilo Gardner  is a 63 year old  (1960), admitted to the above facility from  [facility]. Hospital stay /6/12/2024 through 6/20/2024..     His past medical history includes  ischemic CVA of posterior left MCA due to LV thrombus  LV thrombus  Altered alertness improved with Keppra  Heart failure with reduced ejection fraction  NSTEMI due to demand ischemia  Prediabetes      6/12/2024, patient presented with confusion and expressive/receptive language deficits.  He was found to have a LV thrombus.  He was started on Lovenox and warfarin.  He was transition to Eliquis upon discharge.  He was found to have evidence of L vertebral occlusion at the origin of the CTA.  On 6/17 he started having new symptoms and code stroke was called. MRI with evidnec of new L anterior borderzone infarct. He continued to have fluctuating neuro exam on 6/18 that was out of proportion to new infarct burden. Neuro unclear if symptoms were d/t new ischemia or localization seizures. He was started on keppra with improvement of symptoms even though no clear seizures captured on EEG. Neuro recommends that he continue on keppra at time of discharge until clinic follow-up.   His echo revealed an EF of 15-20% with LV apical thrombus.  He was started on medical management.  He was unable to complete a chest CTA due to his inability to hold his arms above his head for the needed time.  His CT coronary calcium score was 23 which was low and no need for inpatient coronary angiogram.    Today his right hand is swollen and erythremic.         CODE STATUS/ADVANCE DIRECTIVES DISCUSSION:  Full Code    ALLERGIES: No  Known Allergies   PAST MEDICAL HISTORY: No past medical history on file.   PAST SURGICAL HISTORY:   has no past surgical history on file.  FAMILY HISTORY: family history is not on file.  SOCIAL HISTORY:     Patient's living condition: lives alone    Post Discharge Medication Reconciliation Status:   MED REC REQUIRED  Post Medication Reconciliation Status:  Discharge medications reconciled and changed, see notes/orders       Current Outpatient Medications   Medication Sig Dispense Refill    apixaban ANTICOAGULANT (ELIQUIS) 5 MG tablet Take 1 tablet (5 mg) by mouth 2 times daily      atorvastatin (LIPITOR) 40 MG tablet Take 1 tablet (40 mg) by mouth every evening      carvedilol (COREG) 25 MG tablet Take 0.5 tablets (12.5 mg) by mouth 2 times daily (with meals) HOLD If sbp <110 or HR <60      cephALEXin (KEFLEX) 500 MG capsule Take 1 capsule (500 mg) by mouth 4 times daily for 5 days 20 capsule 0    empagliflozin (JARDIANCE) 10 MG TABS tablet Take 1 tablet (10 mg) by mouth daily      furosemide (LASIX) 40 MG tablet Take 1 tablet (40 mg) by mouth daily Hold if sbp <100      insulin aspart (NOVOLOG PEN) 100 UNIT/ML pen Inject 1-7 Units Subcutaneous 3 times daily (before meals) Correction Scale - MEDIUM INSULIN RESISTANCE DOSING     Do Not give Correction Insulin if Pre-Meal BG less than 140.   For Pre-Meal  - 189 give 1 unit.   For Pre-Meal  - 239 give 2 units.   For Pre-Meal  - 289 give 3 units.   For Pre-Meal  - 339 give 4 units.   For Pre-Meal - 389 give 5 units.   For Pre-Meal -439 give 6 units  For Pre-Meal BG greater than or equal to 440 give 7 units.   To be given with prandial insulin, and based on pre-meal blood glucose. Administering insulin within 5 minutes of the start of the meal is ideal. Administer insulin no more than 30 minutes after the start of the meal, unless directed otherwise by provider.     Notify provider if glucose greater than or equal to 350 mg/dL after  "administration of correction dose.      insulin aspart (NOVOLOG PEN) 100 UNIT/ML pen Inject 1-5 Units Subcutaneous at bedtime MEDIUM INSULIN RESISTANCE DOSING    Do Not give Bedtime Correction Insulin if BG less than  200.   For  - 249 give 1 units.   For  - 299 give 2 units.   For  - 349 give 3 units.   For  -399 give 4 units.   For BG greater than or equal to 400 give 5 units.  Notify provider if glucose greater than or equal to 350 mg/dL after administration of correction dose.      levETIRAcetam (KEPPRA) 750 MG tablet Take 2 tablets (1,500 mg) by mouth 2 times daily      sacubitril-valsartan (ENTRESTO) 24-26 MG per tablet Take 1 tablet by mouth 2 times daily HOLD if sbp <105      spironolactone (ALDACTONE) 25 MG tablet Take 1 tablet (25 mg) by mouth daily HOLD if sbp <115      potassium chloride (KLOR-CON) 20 MEQ packet Take 20 mEq by mouth daily for 3 days (Patient not taking: Reported on 6/24/2024)       No current facility-administered medications for this visit.       ROS:  4 point ROS including Respiratory, CV, GI and , other than that noted in the HPI,  is negative    Vitals:  /62   Pulse 86   Temp 97.1  F (36.2  C)   Resp 17   Ht 1.702 m (5' 7\")   Wt 113.2 kg (249 lb 9.6 oz)   SpO2 90%   BMI 39.09 kg/m    Exam:  GENERAL APPEARANCE:  Alert, asphasic  RESP:  lungs clear to auscultation , no respiratory distress  CV:  peripheral edema 2+ in right arm, rate-normal  SKIN:  right hand swollen and red  PSYCH:  aphasic    Lab/Diagnostic data:  Most Recent 3 CBC's:  Recent Labs   Lab Test 06/17/24  1808 06/16/24  0731 06/15/24  0612   WBC 8.7 8.2 8.6   HGB 17.0 15.3 14.2   MCV 94 97 96    310 281     Most Recent 3 BMP's:  Recent Labs   Lab Test 06/20/24  1407 06/20/24  1150 06/20/24  0810 06/20/24  0756 06/20/24  0405 06/19/24  2357 06/19/24  0828 06/19/24  0819 06/18/24  0803 06/18/24  0755   NA  --   --  143  --   --   --   --  142  --  142   POTASSIUM 3.6  --  3.2*  " --   --  3.3*  --  2.9*  --  3.5   CHLORIDE  --   --  103  --   --   --   --  102  --  105   CO2  --   --  26  --   --   --   --  27  --  24   BUN  --   --  14.6  --   --   --   --  15.0  --  17.9   CR  --   --  0.88  --   --   --   --  0.87  --  0.94   ANIONGAP  --   --  14  --   --   --   --  13  --  13   PARISH  --   --  9.1  --   --   --   --  9.3  --  9.0   GLC  --  144* 103* 105*   < >  --    < > 111*   < > 96    < > = values in this interval not displayed.     Most Recent 2 LFT's:No lab results found.  Most Recent 3 BNP's:  Recent Labs   Lab Test 06/16/24  1413 06/12/24  1408   NTBNPI 3,229* 12,210*     Most Recent D-dimer:No lab results found.  Most Recent Cholesterol Panel:  Recent Labs   Lab Test 06/13/24  0742   CHOL 135   LDL 82   HDL 41   TRIG 58       Most Recent Hemoglobin A1c:  Recent Labs   Lab Test 06/12/24  1408   A1C 6.4*       ASSESSMENT/PLAN:    (I63.9) Ischemic cerebrovascular accident (CVA) (H)  (primary encounter diagnosis)  Comment: left hemispheric infarcts, presumed embolic due to LV thrombus  L vertebral occlusion at origin on CTA.   Plan:   Right [facility] Date & Time of Appointment: 6/26/ @3 :00 PM Phone Number: [phone] Address: [facility] [address]  Follow up with neurology on 8/13  Follow up with sleep medicine on 10/30    (I24.0) LV (left ventricular) mural thrombus without MI (H)  Comment: Echo revealed LV thrombus   Taking apixaban  Plan: follow with cardiology on 6/28   CBC and BMP     (R41.0) Confusion  Comment: was having some confusion. Started on Keppra  Plan: follow up with neurology     (I50.20) Heart failure with reduced ejection fraction, NYHA class III (H)  (I24.89) Demand ischemia (H)  (I21.4) NSTEMI (non-ST elevated myocardial infarction) (H)  Comment: EF 30-35%  Fluid status slightly fluid overloaded*  ACE/ARB/ARNI: Entresto  SGLT2 - Jardiance  BB: coreg  Aldosterone Antagonist: spironolactone  SCD: not qualified at this time  Plan:   Daily  weights  Follow up with cardiology       (R73.03) Prediabetes  Comment: chronic  Hgb A1c = 6.4 (6/12)   Blood sugars WNL  Plan: stop sliding scale  Stop 4x/day blood sugar  Start daily blood sugar alternating times    (E66.01,  Z68.39) Class 2 severe obesity due to excess calories with serious comorbidity and body mass index (BMI) of 39.0 to 39.9 in adult (H)  Comment: chronic  Plan: slow weight loss recommended    (I10) Benign essential hypertension  Comment: chronic  On coreg, furosemide, spironolactone  Plan: monitor BMP and CBC     (R53.81) Physical deconditioning  (R53.1) Generalized weakness  Comment: asphasia   Right sided weakness  Plan: therapy to evaluate and treat      Cellulitis   Comment: right hand red and swollen  Plan: start keflex 4x per day x 5 days for right hand cellulitis     Constipation  Comment: no BM x 3-4 days  Plan: suppository x 1 today   Senna BID     Electronically signed by:  DEBRA Javed CNP on 6/24/2024 at 8:23 PM        45 minutes was spent reviewing medical record from [facility], assessing patient, reviewing medical notes from previous primary care provider, talking with pt and answering their questions/concerns, coordinating above plan of care with nursing , SW, therapy and dietitian and patient and reviewed medications with patient and counseled pt. on above plan of care.  Counseled on medications and side effects.

## 2024-06-24 NOTE — LETTER
6/24/2024      Cirilo Gardner  [address] [facility] [address]        [facility] GERIATRICS    PRIMARY CARE PROVIDER AND CLINIC:  Physician No Ref-Primary, No address on file  Chief Complaint   Patient presents with     Hospital F/U      [place] Medical Record Number:  [id number]  Place of Service where encounter took place:  [facility] (TCU) [[id number]]    Cirilo Gardner  is a 63 year old  (1960), admitted to the above facility from  [facility]. Hospital stay /6/12/2024 through 6/20/2024..     His past medical history includes  ischemic CVA of posterior left MCA due to LV thrombus  LV thrombus  Altered alertness improved with Keppra  Heart failure with reduced ejection fraction  NSTEMI due to demand ischemia  Prediabetes      6/12/2024, patient presented with confusion and expressive/receptive language deficits.  He was found to have a LV thrombus.  He was started on Lovenox and warfarin.  [facility]  He was found to have evidence of L vertebral occlusion at the origin of the CTA.  On 6/17 he started having new symptoms and code stroke was called. MRI with evidnec of new L anterior borderzone infarct. He continued to have fluctuating neuro exam on 6/18 that was out of proportion to new infarct burden. Neuro unclear if symptoms were d/t new ischemia or localization seizures. He was started on keppra with improvement of symptoms even though no clear seizures captured on EEG. Neuro recommends that he continue on keppra at time of discharge until clinic follow-up.   His echo revealed an EF of 15-20% with LV apical thrombus.  He was started on medical management.  He was unable to complete a chest CTA due to his inability to hold his arms above his head for the needed time.  His CT coronary calcium score was 23 which was low and no need for inpatient coronary angiogram.    Today his right hand is swollen and  erythremic.         CODE STATUS/ADVANCE DIRECTIVES DISCUSSION:  Full Code    ALLERGIES: No Known Allergies   PAST MEDICAL HISTORY: No past medical history on file.   PAST SURGICAL HISTORY:   has no past surgical history on file.  FAMILY HISTORY: family history is not on file.  SOCIAL HISTORY:     Patient's living condition: lives alone    Post Discharge Medication Reconciliation Status:   MED REC REQUIRED  Post Medication Reconciliation Status:  Discharge medications reconciled and changed, see notes/orders       Current Outpatient Medications   Medication Sig Dispense Refill     apixaban ANTICOAGULANT (ELIQUIS) 5 MG tablet Take 1 tablet (5 mg) by mouth 2 times daily       atorvastatin (LIPITOR) 40 MG tablet Take 1 tablet (40 mg) by mouth every evening       carvedilol (COREG) 25 MG tablet Take 0.5 tablets (12.5 mg) by mouth 2 times daily (with meals) HOLD If sbp <110 or HR <60       cephALEXin (KEFLEX) 500 MG capsule Take 1 capsule (500 mg) by mouth 4 times daily for 5 days 20 capsule 0     empagliflozin (JARDIANCE) 10 MG TABS tablet Take 1 tablet (10 mg) by mouth daily       furosemide (LASIX) 40 MG tablet Take 1 tablet (40 mg) by mouth daily Hold if sbp <100       insulin aspart (NOVOLOG PEN) 100 UNIT/ML pen Inject 1-7 Units Subcutaneous 3 times daily (before meals) Correction Scale - MEDIUM INSULIN RESISTANCE DOSING     Do Not give Correction Insulin if Pre-Meal BG less than 140.   For Pre-Meal  - 189 give 1 unit.   For Pre-Meal  - 239 give 2 units.   For Pre-Meal  - 289 give 3 units.   For Pre-Meal  - 339 give 4 units.   For Pre-Meal - 389 give 5 units.   For Pre-Meal -439 give 6 units  For Pre-Meal BG greater than or equal to 440 give 7 units.   To be given with prandial insulin, and based on pre-meal blood glucose. Administering insulin within 5 minutes of the start of the meal is ideal. Administer insulin no more than 30 minutes after the start of the meal, unless directed  "otherwise by provider.     Notify provider if glucose greater than or equal to 350 mg/dL after administration of correction dose.       insulin aspart (NOVOLOG PEN) 100 UNIT/ML pen Inject 1-5 Units Subcutaneous at bedtime MEDIUM INSULIN RESISTANCE DOSING    Do Not give Bedtime Correction Insulin if BG less than  200.   For  - 249 give 1 units.   For  - 299 give 2 units.   For  - 349 give 3 units.   For  -399 give 4 units.   For BG greater than or equal to 400 give 5 units.  Notify provider if glucose greater than or equal to 350 mg/dL after administration of correction dose.       levETIRAcetam (KEPPRA) 750 MG tablet Take 2 tablets (1,500 mg) by mouth 2 times daily       sacubitril-valsartan (ENTRESTO) 24-26 MG per tablet Take 1 tablet by mouth 2 times daily HOLD if sbp <105       spironolactone (ALDACTONE) 25 MG tablet Take 1 tablet (25 mg) by mouth daily HOLD if sbp <115       potassium chloride (KLOR-CON) 20 MEQ packet Take 20 mEq by mouth daily for 3 days (Patient not taking: Reported on 6/24/2024)       No current facility-administered medications for this visit.       ROS:  4 point ROS including Respiratory, CV, GI and , other than that noted in the HPI,  is negative    Vitals:  /62   Pulse 86   Temp 97.1  F (36.2  C)   Resp 17   Ht 1.702 m (5' 7\")   Wt 113.2 kg (249 lb 9.6 oz)   SpO2 90%   BMI 39.09 kg/m    Exam:  GENERAL APPEARANCE:  Alert, asphasic  RESP:  lungs clear to auscultation , no respiratory distress  CV:  peripheral edema 2+ in right arm, rate-normal  SKIN:  right hand swollen and red  PSYCH:  aphasic    Lab/Diagnostic data:  Most Recent 3 CBC's:  Recent Labs   Lab Test 06/17/24  1808 06/16/24  0731 06/15/24  0612   WBC 8.7 8.2 8.6   HGB 17.0 15.3 14.2   MCV 94 97 96    310 281     Most Recent 3 BMP's:  Recent Labs   Lab Test 06/20/24  1407 06/20/24  1150 06/20/24  0810 06/20/24  0756 06/20/24  0405 06/19/24  2357 06/19/24  0828 06/19/24  0819 " 06/18/24  0803 06/18/24  0755   NA  --   --  143  --   --   --   --  142  --  142   POTASSIUM 3.6  --  3.2*  --   --  3.3*  --  2.9*  --  3.5   CHLORIDE  --   --  103  --   --   --   --  102  --  105   CO2  --   --  26  --   --   --   --  27  --  24   BUN  --   --  14.6  --   --   --   --  15.0  --  17.9   CR  --   --  0.88  --   --   --   --  0.87  --  0.94   ANIONGAP  --   --  14  --   --   --   --  13  --  13   PARISH  --   --  9.1  --   --   --   --  9.3  --  9.0   GLC  --  144* 103* 105*   < >  --    < > 111*   < > 96    < > = values in this interval not displayed.     Most Recent 2 LFT's:No lab results found.  Most Recent 3 BNP's:  Recent Labs   Lab Test 06/16/24  1413 06/12/24  1408   NTBNPI 3,229* 12,210*     Most Recent D-dimer:No lab results found.  Most Recent Cholesterol Panel:  Recent Labs   Lab Test 06/13/24  0742   CHOL 135   LDL 82   HDL 41   TRIG 58       Most Recent Hemoglobin A1c:  Recent Labs   Lab Test 06/12/24  1408   A1C 6.4*       ASSESSMENT/PLAN:    (I63.9) Ischemic cerebrovascular accident (CVA) (H)  (primary encounter diagnosis)  Comment: left hemispheric infarcts, presumed embolic due to LV thrombus  L vertebral occlusion at origin on CTA.   Plan:   Right [facility] Date & Time of Appointment: 6/26/ @3 :00 PM Phone Number: [phone] Address: [facility] [address]  Follow up with neurology on 8/13  Follow up with sleep medicine on 10/30    (I24.0) LV (left ventricular) mural thrombus without MI (H)  Comment: Echo revealed LV thrombus   Taking apixaban  Plan: follow with cardiology on 6/28   CBC and BMP     (R41.0) Confusion  Comment: was having some confusion. Started on Keppra  Plan: follow up with neurology     (I50.20) Heart failure with reduced ejection fraction, NYHA class III (H)  (I24.89) Demand ischemia (H)  (I21.4) NSTEMI (non-ST elevated myocardial infarction) (H)  Comment: EF 30-35%  Fluid status slightly fluid overloaded*  ACE/ARB/ARNI: Entresto  SGLT2 -  Jardiance  BB: coreg  Aldosterone Antagonist: spironolactone  SCD: not qualified at this time  Plan:   Daily weights  Follow up with cardiology       (R73.03) Prediabetes  Comment: chronic  Hgb A1c = 6.4 (6/12)   Blood sugars WNL  Plan: stop sliding scale  Stop 4x/day blood sugar  Start daily blood sugar alternating times    (E66.01,  Z68.39) Class 2 severe obesity due to excess calories with serious comorbidity and body mass index (BMI) of 39.0 to 39.9 in adult (H)  Comment: chronic  Plan: slow weight loss recommended    (I10) Benign essential hypertension  Comment: chronic  On coreg, furosemide, spironolactone  Plan: monitor BMP and CBC     (R53.81) Physical deconditioning  (R53.1) Generalized weakness  Comment: asphasia   Right sided weakness  Plan: therapy to evaluate and treat      Cellulitis   Comment: right hand red and swollen  Plan: start keflex 4x per day x 5 days for right hand cellulitis     Constipation  Comment: no BM x 3-4 days  Plan: suppository x 1 today   Senna BID     Electronically signed by:  DEBRA Javed CNP on 6/24/2024 at 8:23 PM        45 minutes was spent reviewing medical record from [facility], assessing patient, reviewing medical notes from previous primary care provider, talking with pt and answering their questions/concerns, coordinating above plan of care with nursing , SW, therapy and dietitian and patient and reviewed medications with patient and counseled pt. on above plan of care.  Counseled on medications and side effects.                   Sincerely,        DEBRA Javed CNP

## 2024-06-25 ENCOUNTER — TRANSITIONAL CARE UNIT VISIT (OUTPATIENT)
Dept: GERIATRICS | Facility: CLINIC | Age: 64
End: 2024-06-25
Payer: COMMERCIAL

## 2024-06-25 ENCOUNTER — LAB REQUISITION (OUTPATIENT)
Dept: LAB | Facility: CLINIC | Age: 64
End: 2024-06-25
Payer: COMMERCIAL

## 2024-06-25 VITALS
BODY MASS INDEX: 39.1 KG/M2 | SYSTOLIC BLOOD PRESSURE: 114 MMHG | DIASTOLIC BLOOD PRESSURE: 66 MMHG | RESPIRATION RATE: 17 BRPM | WEIGHT: 249.1 LBS | OXYGEN SATURATION: 92 % | HEIGHT: 67 IN | TEMPERATURE: 97.1 F | HEART RATE: 78 BPM

## 2024-06-25 DIAGNOSIS — R41.0 CONFUSION: ICD-10-CM

## 2024-06-25 DIAGNOSIS — I24.0 LV (LEFT VENTRICULAR) MURAL THROMBUS WITHOUT MI (H): ICD-10-CM

## 2024-06-25 DIAGNOSIS — E66.812 CLASS 2 SEVERE OBESITY DUE TO EXCESS CALORIES WITH SERIOUS COMORBIDITY AND BODY MASS INDEX (BMI) OF 39.0 TO 39.9 IN ADULT (H): ICD-10-CM

## 2024-06-25 DIAGNOSIS — E08.65 DIABETES MELLITUS DUE TO UNDERLYING CONDITION WITH HYPERGLYCEMIA (H): ICD-10-CM

## 2024-06-25 DIAGNOSIS — M79.89 SWELLING OF LIMB: ICD-10-CM

## 2024-06-25 DIAGNOSIS — L03.113 CELLULITIS OF RIGHT UPPER EXTREMITY: ICD-10-CM

## 2024-06-25 DIAGNOSIS — I24.89 DEMAND ISCHEMIA (H): ICD-10-CM

## 2024-06-25 DIAGNOSIS — R53.81 PHYSICAL DECONDITIONING: ICD-10-CM

## 2024-06-25 DIAGNOSIS — K59.01 SLOW TRANSIT CONSTIPATION: ICD-10-CM

## 2024-06-25 DIAGNOSIS — K59.00 CONSTIPATION, UNSPECIFIED CONSTIPATION TYPE: ICD-10-CM

## 2024-06-25 DIAGNOSIS — G62.9 NEUROPATHY: Primary | ICD-10-CM

## 2024-06-25 DIAGNOSIS — R73.03 PREDIABETES: ICD-10-CM

## 2024-06-25 DIAGNOSIS — E66.01 CLASS 2 SEVERE OBESITY DUE TO EXCESS CALORIES WITH SERIOUS COMORBIDITY AND BODY MASS INDEX (BMI) OF 39.0 TO 39.9 IN ADULT (H): ICD-10-CM

## 2024-06-25 DIAGNOSIS — I63.9 ISCHEMIC CEREBROVASCULAR ACCIDENT (CVA) (H): ICD-10-CM

## 2024-06-25 DIAGNOSIS — I21.4 NSTEMI (NON-ST ELEVATED MYOCARDIAL INFARCTION) (H): ICD-10-CM

## 2024-06-25 DIAGNOSIS — R53.1 GENERALIZED WEAKNESS: ICD-10-CM

## 2024-06-25 DIAGNOSIS — I10 BENIGN ESSENTIAL HYPERTENSION: ICD-10-CM

## 2024-06-25 DIAGNOSIS — I50.20 HEART FAILURE WITH REDUCED EJECTION FRACTION, NYHA CLASS III (H): ICD-10-CM

## 2024-06-25 PROCEDURE — 99309 SBSQ NF CARE MODERATE MDM 30: CPT | Performed by: NURSE PRACTITIONER

## 2024-06-25 NOTE — LETTER
6/25/2024      Cirilo Gardner  [address] [facility] [address]         [facility] GERIATRICS        Visit Type: Cellulitis and X-ray Results     Facility:   [facility] ([facility]) [[id number]]         History of Present Illness: Cirilo Gardner is a 63 year old male     His past medical history includes  ischemic CVA of posterior left MCA due to LV thrombus  LV thrombus  Altered alertness improved with Keppra  Heart failure with reduced ejection fraction  NSTEMI due to demand ischemia  Prediabetes        6/12/2024, patient presented with confusion and expressive/receptive language deficits.  He was found to have a LV thrombus.  He was started on Lovenox and warfarin.  He was transition to Eliquis upon discharge.  He was found to have evidence of L vertebral occlusion at the origin of the CTA.  On 6/17 he started having new symptoms and code stroke was called. MRI with evidnec of new L anterior borderzone infarct. He continued to have fluctuating neuro exam on 6/18 that was out of proportion to new infarct burden. Neuro unclear if symptoms were d/t new ischemia or localization seizures. He was started on keppra with improvement of symptoms even though no clear seizures captured on EEG. Neuro recommends that he continue on keppra at time of discharge until clinic follow-up.   His echo revealed an EF of 15-20% with LV apical thrombus.  He was started on medical management.  He was unable to complete a chest CTA due to his inability to hold his arms above his head for the needed time.  His CT coronary calcium score was 23 which was low and no need for inpatient coronary angiogram.    Today his right hand remains swollen and red despite keflex     Current Outpatient Medications   Medication Sig Dispense Refill     gabapentin (NEURONTIN) 100 MG capsule Take 1 capsule (100 mg) by mouth at bedtime       apixaban ANTICOAGULANT (ELIQUIS) 5 MG tablet Take 1 tablet (5 mg) by mouth 2 times daily        atorvastatin (LIPITOR) 40 MG tablet Take 1 tablet (40 mg) by mouth every evening       carvedilol (COREG) 25 MG tablet Take 0.5 tablets (12.5 mg) by mouth 2 times daily (with meals) HOLD If sbp <110 or HR <60       cephALEXin (KEFLEX) 500 MG capsule Take 1 capsule (500 mg) by mouth 4 times daily for 5 days 20 capsule 0     empagliflozin (JARDIANCE) 10 MG TABS tablet Take 1 tablet (10 mg) by mouth daily       furosemide (LASIX) 40 MG tablet Take 1 tablet (40 mg) by mouth daily Hold if sbp <100       insulin aspart (NOVOLOG PEN) 100 UNIT/ML pen Inject 1-7 Units Subcutaneous 3 times daily (before meals) Correction Scale - MEDIUM INSULIN RESISTANCE DOSING     Do Not give Correction Insulin if Pre-Meal BG less than 140.   For Pre-Meal  - 189 give 1 unit.   For Pre-Meal  - 239 give 2 units.   For Pre-Meal  - 289 give 3 units.   For Pre-Meal  - 339 give 4 units.   For Pre-Meal - 389 give 5 units.   For Pre-Meal -439 give 6 units  For Pre-Meal BG greater than or equal to 440 give 7 units.   To be given with prandial insulin, and based on pre-meal blood glucose. Administering insulin within 5 minutes of the start of the meal is ideal. Administer insulin no more than 30 minutes after the start of the meal, unless directed otherwise by provider.     Notify provider if glucose greater than or equal to 350 mg/dL after administration of correction dose.       insulin aspart (NOVOLOG PEN) 100 UNIT/ML pen Inject 1-5 Units Subcutaneous at bedtime MEDIUM INSULIN RESISTANCE DOSING    Do Not give Bedtime Correction Insulin if BG less than  200.   For  - 249 give 1 units.   For  - 299 give 2 units.   For  - 349 give 3 units.   For  -399 give 4 units.   For BG greater than or equal to 400 give 5 units.  Notify provider if glucose greater than or equal to 350 mg/dL after administration of correction dose.       levETIRAcetam (KEPPRA) 750 MG tablet Take 2 tablets (1,500 mg) by  "mouth 2 times daily       sacubitril-valsartan (ENTRESTO) 24-26 MG per tablet Take 1 tablet by mouth 2 times daily HOLD if sbp <105       spironolactone (ALDACTONE) 25 MG tablet Take 1 tablet (25 mg) by mouth daily HOLD if sbp <115         ALLERGIES:Patient has no known allergies.    Vitals:  /66   Pulse 78   Temp 97.1  F (36.2  C)   Resp 17   Ht 1.702 m (5' 7\")   Wt 113 kg (249 lb 1.6 oz)   SpO2 92%   BMI 39.01 kg/m    Body mass index is 39.01 kg/m .    Review of Systems   All other systems reviewed and are negative.         Physical Exam  Vitals and nursing note reviewed.   Pulmonary:      Effort: Pulmonary effort is normal.   Skin:     Comments: Hand red, swollen   Neurological:      Mental Status: He is alert.      Comments: Aphasic             Labs:     Most Recent 3 CBC's:  Recent Labs   Lab Test 06/26/24  0606 06/17/24  1808 06/16/24  0731   WBC 8.8 8.7 8.2   HGB 15.9 17.0 15.3   MCV 97 94 97    362 310     Most Recent 3 BMP's:  Recent Labs   Lab Test 06/27/24  0648 06/26/24  0606 06/20/24  1407 06/20/24  1150 06/20/24  0810    140  --   --  143   POTASSIUM 4.1 3.5 3.6  --  3.2*   CHLORIDE 105 104  --   --  103   CO2 21* 24  --   --  26   BUN 22.3 17.2  --   --  14.6   CR 0.88 0.95  --   --  0.88   ANIONGAP 14 12  --   --  14   PARISH 10.1 9.6  --   --  9.1   * 91  --  144* 103*     Most Recent 2 LFT's:No lab results found.  Most Recent 3 INR's:  Recent Labs   Lab Test 06/19/24  0819 06/18/24  0755 06/17/24  1808   INR 1.41* 1.28* 1.19*     Most Recent Cholesterol Panel:  Recent Labs   Lab Test 06/13/24  0742   CHOL 135   LDL 82   HDL 41   TRIG 58       Most Recent Hemoglobin A1c:  Recent Labs   Lab Test 06/12/24  1408   A1C 6.4*         Assessment/Plan:    (I63.9) Ischemic cerebrovascular accident (CVA) (H)  (primary encounter diagnosis)  Comment: left hemispheric infarcts, presumed embolic due to LV thrombus  L vertebral occlusion at origin on CTA.   Plan:   Right " [facility] Date & Time of Appointment: 6/26/ @3 :00 PM Phone Number: [phone] Address: [address]  Follow up with neurology on 8/13  Follow up with sleep medicine on 10/30     (I24.0) LV (left ventricular) mural thrombus without MI (H)  Comment: Echo revealed LV thrombus   Taking apixaban  Plan: follow with cardiology on 6/28   CBC and BMP      (R41.0) Confusion  Comment: was having some confusion. Started on Keppra  Plan: follow up with neurology      (I50.20) Heart failure with reduced ejection fraction, NYHA class III (H)  (I24.89) Demand ischemia (H)  (I21.4) NSTEMI (non-ST elevated myocardial infarction) (H)  Comment: EF 30-35%  Fluid status slightly fluid overloaded*  ACE/ARB/ARNI: Entresto  SGLT2 - Jardiance  BB: coreg  Aldosterone Antagonist: spironolactone  SCD: not qualified at this time  Plan:   Daily weights  Follow up with cardiology         (R73.03) Prediabetes  Comment: chronic  Hgb A1c = 6.4 (6/12)   Blood sugars WNL  Plan: continue sugar alternating times     (E66.01,  Z68.39) Class 2 severe obesity due to excess calories with serious comorbidity and body mass index (BMI) of 39.0 to 39.9 in adult (H)  Comment: chronic  Plan: Continue with plan of care no changes at this time, adjustment as needed         (I10) Benign essential hypertension  Comment: chronic  On coreg, furosemide, spironolactone  Plan: monitor BMP and CBC      (R53.81) Physical deconditioning  (R53.1) Generalized weakness  Comment: asphasia   Right sided weakness  Working with therapy  Plan: Continue with plan of care no changes at this time, adjustment as needed      (M79.89) Swelling of limb  (L03.113) Cellulitis of right upper extremity   Comment: right hand red and swollen  Continue keflex  Plan:Continue with plan of care no changes at this time, adjustment as needed          (K59.01) Slow transit constipation  Comment: no BM x 3-4 days  Plan: continue with Bowel medications          Electronically  signed by:DEBRA Javde CNP    MEDICATIONS:  MED REC REQUIRED  Post Medication Reconciliation Status:  Medication reconciliation previously completed during another office visit                 Sincerely,        DEBRA Javed CNP

## 2024-06-25 NOTE — PROGRESS NOTES
[facility] GERIATRICS        Visit Type: Cellulitis and X-ray Results     Facility:   [facility] ([facility]) [[id number]]         History of Present Illness: Cirilo Gardner is a 63 year old male     His past medical history includes  ischemic CVA of posterior left MCA due to LV thrombus  LV thrombus  Altered alertness improved with Keppra  Heart failure with reduced ejection fraction  NSTEMI due to demand ischemia  Prediabetes        6/12/2024, patient presented with confusion and expressive/receptive language deficits.  He was found to have a LV thrombus.  He was started on Lovenox and warfarin.  He was transition to Eliquis upon discharge.  He was found to have evidence of L vertebral occlusion at the origin of the CTA.  On 6/17 he started having new symptoms and code stroke was called. MRI with evidnec of new L anterior borderzone infarct. He continued to have fluctuating neuro exam on 6/18 that was out of proportion to new infarct burden. Neuro unclear if symptoms were d/t new ischemia or localization seizures. He was started on keppra with improvement of symptoms even though no clear seizures captured on EEG. Neuro recommends that he continue on keppra at time of discharge until clinic follow-up.   His echo revealed an EF of 15-20% with LV apical thrombus.  He was started on medical management.  He was unable to complete a chest CTA due to his inability to hold his arms above his head for the needed time.  His CT coronary calcium score was 23 which was low and no need for inpatient coronary angiogram.    Today his right hand remains swollen and red despite keflex     Current Outpatient Medications   Medication Sig Dispense Refill    gabapentin (NEURONTIN) 100 MG capsule Take 1 capsule (100 mg) by mouth at bedtime      apixaban ANTICOAGULANT (ELIQUIS) 5 MG tablet Take 1 tablet (5 mg) by mouth 2 times daily      atorvastatin (LIPITOR) 40 MG tablet Take 1 tablet (40 mg) by mouth every evening       carvedilol (COREG) 25 MG tablet Take 0.5 tablets (12.5 mg) by mouth 2 times daily (with meals) HOLD If sbp <110 or HR <60      cephALEXin (KEFLEX) 500 MG capsule Take 1 capsule (500 mg) by mouth 4 times daily for 5 days 20 capsule 0    empagliflozin (JARDIANCE) 10 MG TABS tablet Take 1 tablet (10 mg) by mouth daily      furosemide (LASIX) 40 MG tablet Take 1 tablet (40 mg) by mouth daily Hold if sbp <100      insulin aspart (NOVOLOG PEN) 100 UNIT/ML pen Inject 1-7 Units Subcutaneous 3 times daily (before meals) Correction Scale - MEDIUM INSULIN RESISTANCE DOSING     Do Not give Correction Insulin if Pre-Meal BG less than 140.   For Pre-Meal  - 189 give 1 unit.   For Pre-Meal  - 239 give 2 units.   For Pre-Meal  - 289 give 3 units.   For Pre-Meal  - 339 give 4 units.   For Pre-Meal - 389 give 5 units.   For Pre-Meal -439 give 6 units  For Pre-Meal BG greater than or equal to 440 give 7 units.   To be given with prandial insulin, and based on pre-meal blood glucose. Administering insulin within 5 minutes of the start of the meal is ideal. Administer insulin no more than 30 minutes after the start of the meal, unless directed otherwise by provider.     Notify provider if glucose greater than or equal to 350 mg/dL after administration of correction dose.      insulin aspart (NOVOLOG PEN) 100 UNIT/ML pen Inject 1-5 Units Subcutaneous at bedtime MEDIUM INSULIN RESISTANCE DOSING    Do Not give Bedtime Correction Insulin if BG less than  200.   For  - 249 give 1 units.   For  - 299 give 2 units.   For  - 349 give 3 units.   For  -399 give 4 units.   For BG greater than or equal to 400 give 5 units.  Notify provider if glucose greater than or equal to 350 mg/dL after administration of correction dose.      levETIRAcetam (KEPPRA) 750 MG tablet Take 2 tablets (1,500 mg) by mouth 2 times daily      sacubitril-valsartan (ENTRESTO) 24-26 MG per tablet Take 1 tablet by  "mouth 2 times daily HOLD if sbp <105      spironolactone (ALDACTONE) 25 MG tablet Take 1 tablet (25 mg) by mouth daily HOLD if sbp <115         ALLERGIES:Patient has no known allergies.    Vitals:  /66   Pulse 78   Temp 97.1  F (36.2  C)   Resp 17   Ht 1.702 m (5' 7\")   Wt 113 kg (249 lb 1.6 oz)   SpO2 92%   BMI 39.01 kg/m    Body mass index is 39.01 kg/m .    Review of Systems   All other systems reviewed and are negative.         Physical Exam  Vitals and nursing note reviewed.   Pulmonary:      Effort: Pulmonary effort is normal.   Skin:     Comments: Hand red, swollen   Neurological:      Mental Status: He is alert.      Comments: Aphasic             Labs:     Most Recent 3 CBC's:  Recent Labs   Lab Test 06/26/24  0606 06/17/24  1808 06/16/24  0731   WBC 8.8 8.7 8.2   HGB 15.9 17.0 15.3   MCV 97 94 97    362 310     Most Recent 3 BMP's:  Recent Labs   Lab Test 06/27/24  0648 06/26/24  0606 06/20/24  1407 06/20/24  1150 06/20/24  0810    140  --   --  143   POTASSIUM 4.1 3.5 3.6  --  3.2*   CHLORIDE 105 104  --   --  103   CO2 21* 24  --   --  26   BUN 22.3 17.2  --   --  14.6   CR 0.88 0.95  --   --  0.88   ANIONGAP 14 12  --   --  14   PARISH 10.1 9.6  --   --  9.1   * 91  --  144* 103*     Most Recent 2 LFT's:No lab results found.  Most Recent 3 INR's:  Recent Labs   Lab Test 06/19/24  0819 06/18/24  0755 06/17/24  1808   INR 1.41* 1.28* 1.19*     Most Recent Cholesterol Panel:  Recent Labs   Lab Test 06/13/24  0742   CHOL 135   LDL 82   HDL 41   TRIG 58       Most Recent Hemoglobin A1c:  Recent Labs   Lab Test 06/12/24  1408   A1C 6.4*         Assessment/Plan:    (I63.9) Ischemic cerebrovascular accident (CVA) (H)  (primary encounter diagnosis)  Comment: left hemispheric infarcts, presumed embolic due to LV thrombus  L vertebral occlusion at origin on CTA.   Plan:   Right ing  [facility] Date & Time of Appointment: 6/26/ @3 :00 PM Phone Number: [phone] Address: " [address]  Follow up with neurology on 8/13  Follow up with sleep medicine on 10/30     (I24.0) LV (left ventricular) mural thrombus without MI (H)  Comment: Echo revealed LV thrombus   Taking apixaban  Plan: follow with cardiology on 6/28   CBC and BMP      (R41.0) Confusion  Comment: was having some confusion. Started on Keppra  Plan: follow up with neurology      (I50.20) Heart failure with reduced ejection fraction, NYHA class III (H)  (I24.89) Demand ischemia (H)  (I21.4) NSTEMI (non-ST elevated myocardial infarction) (H)  Comment: EF 30-35%  Fluid status slightly fluid overloaded*  ACE/ARB/ARNI: Entresto  SGLT2 - Jardiance  BB: coreg  Aldosterone Antagonist: spironolactone  SCD: not qualified at this time  Plan:   Daily weights  Follow up with cardiology         (R73.03) Prediabetes  Comment: chronic  Hgb A1c = 6.4 (6/12)   Blood sugars WNL  Plan: continue sugar alternating times     (E66.01,  Z68.39) Class 2 severe obesity due to excess calories with serious comorbidity and body mass index (BMI) of 39.0 to 39.9 in adult (H)  Comment: chronic  Plan: Continue with plan of care no changes at this time, adjustment as needed         (I10) Benign essential hypertension  Comment: chronic  On coreg, furosemide, spironolactone  Plan: monitor BMP and CBC      (R53.81) Physical deconditioning  (R53.1) Generalized weakness  Comment: asphasia   Right sided weakness  Working with therapy  Plan: Continue with plan of care no changes at this time, adjustment as needed      (M79.89) Swelling of limb  (L03.113) Cellulitis of right upper extremity   Comment: right hand red and swollen  Continue keflex  Plan:Continue with plan of care no changes at this time, adjustment as needed          (K59.01) Slow transit constipation  Comment: no BM x 3-4 days  Plan: continue with Bowel medications          Electronically signed by:DEBRA Javed CNP    MEDICATIONS:  MED REC REQUIRED  Post Medication Reconciliation  Status:  Medication reconciliation previously completed during another office visit

## 2024-06-26 ENCOUNTER — LAB REQUISITION (OUTPATIENT)
Dept: LAB | Facility: CLINIC | Age: 64
End: 2024-06-26
Payer: COMMERCIAL

## 2024-06-26 DIAGNOSIS — I50.9 HEART FAILURE, UNSPECIFIED (H): ICD-10-CM

## 2024-06-26 LAB
ANION GAP SERPL CALCULATED.3IONS-SCNC: 12 MMOL/L (ref 7–15)
BUN SERPL-MCNC: 17.2 MG/DL (ref 8–23)
CALCIUM SERPL-MCNC: 9.6 MG/DL (ref 8.8–10.2)
CHLORIDE SERPL-SCNC: 104 MMOL/L (ref 98–107)
CREAT SERPL-MCNC: 0.95 MG/DL (ref 0.67–1.17)
DEPRECATED HCO3 PLAS-SCNC: 24 MMOL/L (ref 22–29)
EGFRCR SERPLBLD CKD-EPI 2021: 89 ML/MIN/1.73M2
ERYTHROCYTE [DISTWIDTH] IN BLOOD BY AUTOMATED COUNT: 13 % (ref 10–15)
GLUCOSE SERPL-MCNC: 91 MG/DL (ref 70–99)
HCT VFR BLD AUTO: 50.2 % (ref 40–53)
HGB BLD-MCNC: 15.9 G/DL (ref 13.3–17.7)
MCH RBC QN AUTO: 30.7 PG (ref 26.5–33)
MCHC RBC AUTO-ENTMCNC: 31.7 G/DL (ref 31.5–36.5)
MCV RBC AUTO: 97 FL (ref 78–100)
PLATELET # BLD AUTO: 391 10E3/UL (ref 150–450)
POTASSIUM SERPL-SCNC: 3.5 MMOL/L (ref 3.4–5.3)
RBC # BLD AUTO: 5.18 10E6/UL (ref 4.4–5.9)
SODIUM SERPL-SCNC: 140 MMOL/L (ref 135–145)
WBC # BLD AUTO: 8.8 10E3/UL (ref 4–11)

## 2024-06-26 PROCEDURE — 36415 COLL VENOUS BLD VENIPUNCTURE: CPT | Mod: ORL | Performed by: NURSE PRACTITIONER

## 2024-06-26 PROCEDURE — P9604 ONE-WAY ALLOW PRORATED TRIP: HCPCS | Mod: ORL | Performed by: NURSE PRACTITIONER

## 2024-06-26 PROCEDURE — 85027 COMPLETE CBC AUTOMATED: CPT | Mod: ORL | Performed by: NURSE PRACTITIONER

## 2024-06-26 PROCEDURE — 80048 BASIC METABOLIC PNL TOTAL CA: CPT | Mod: ORL | Performed by: NURSE PRACTITIONER

## 2024-06-27 LAB
ANION GAP SERPL CALCULATED.3IONS-SCNC: 14 MMOL/L (ref 7–15)
BUN SERPL-MCNC: 22.3 MG/DL (ref 8–23)
CALCIUM SERPL-MCNC: 10.1 MG/DL (ref 8.8–10.2)
CHLORIDE SERPL-SCNC: 105 MMOL/L (ref 98–107)
CREAT SERPL-MCNC: 0.88 MG/DL (ref 0.67–1.17)
DEPRECATED HCO3 PLAS-SCNC: 21 MMOL/L (ref 22–29)
EGFRCR SERPLBLD CKD-EPI 2021: >90 ML/MIN/1.73M2
GLUCOSE SERPL-MCNC: 125 MG/DL (ref 70–99)
NT-PROBNP SERPL-MCNC: 842 PG/ML (ref 0–900)
POTASSIUM SERPL-SCNC: 4.1 MMOL/L (ref 3.4–5.3)
SODIUM SERPL-SCNC: 140 MMOL/L (ref 135–145)

## 2024-06-27 PROCEDURE — 83880 ASSAY OF NATRIURETIC PEPTIDE: CPT | Mod: ORL | Performed by: NURSE PRACTITIONER

## 2024-06-27 PROCEDURE — 80048 BASIC METABOLIC PNL TOTAL CA: CPT | Mod: ORL | Performed by: NURSE PRACTITIONER

## 2024-06-27 PROCEDURE — 36415 COLL VENOUS BLD VENIPUNCTURE: CPT | Mod: ORL | Performed by: NURSE PRACTITIONER

## 2024-06-27 PROCEDURE — P9604 ONE-WAY ALLOW PRORATED TRIP: HCPCS | Mod: ORL | Performed by: NURSE PRACTITIONER

## 2024-06-27 NOTE — TELEPHONE ENCOUNTER
PA Initiation    Medication: INSULIN ASPART FLEXPEN 100 UNIT/ML SC SOPN  Insurance Company: [employer] - Phone [phone] Fax [phone]  Pharmacy Filling the Rx: [facility] - [place] - [address] [place] [address] [facility]  [facility] Phone: [phone]  Filling Pharmacy Fax: [phone]  Start Date: 6/26/2024

## 2024-06-27 NOTE — TELEPHONE ENCOUNTER
Prior Authorization Not Needed per Insurance    Medication: INSULIN ASPART FLEXPEN 100 UNIT/ML SC SOPN  Insurance Company: [employer] - Phone [phone] Fax [phone]  Expected CoPay: $    Pharmacy Filling the Rx: [facility] - [place] - [address] [place] [address]  Pharmacy Notified: YES  Per insurance, brand Novolog is covered. Pharmacy got a paid claim for brand Novolog. No PA needed.    Patient Notified: Instructed pharmacy to notify patient once order is ready.

## 2024-06-28 ENCOUNTER — OFFICE VISIT (OUTPATIENT)
Dept: CARDIOLOGY | Facility: CLINIC | Age: 64
End: 2024-06-28
Payer: COMMERCIAL

## 2024-06-28 ENCOUNTER — TRANSITIONAL CARE UNIT VISIT (OUTPATIENT)
Dept: GERIATRICS | Facility: CLINIC | Age: 64
End: 2024-06-28
Payer: COMMERCIAL

## 2024-06-28 ENCOUNTER — LAB REQUISITION (OUTPATIENT)
Dept: LAB | Facility: CLINIC | Age: 64
End: 2024-06-28
Payer: COMMERCIAL

## 2024-06-28 VITALS
SYSTOLIC BLOOD PRESSURE: 146 MMHG | BODY MASS INDEX: 38.12 KG/M2 | DIASTOLIC BLOOD PRESSURE: 83 MMHG | OXYGEN SATURATION: 94 % | HEART RATE: 66 BPM | RESPIRATION RATE: 18 BRPM | WEIGHT: 242.9 LBS | TEMPERATURE: 96.8 F | HEIGHT: 67 IN

## 2024-06-28 VITALS
DIASTOLIC BLOOD PRESSURE: 98 MMHG | HEIGHT: 69 IN | OXYGEN SATURATION: 97 % | HEART RATE: 81 BPM | BODY MASS INDEX: 36.43 KG/M2 | WEIGHT: 246 LBS | SYSTOLIC BLOOD PRESSURE: 154 MMHG

## 2024-06-28 DIAGNOSIS — E66.01 CLASS 2 SEVERE OBESITY DUE TO EXCESS CALORIES WITH SERIOUS COMORBIDITY AND BODY MASS INDEX (BMI) OF 39.0 TO 39.9 IN ADULT (H): ICD-10-CM

## 2024-06-28 DIAGNOSIS — R41.0 CONFUSION: ICD-10-CM

## 2024-06-28 DIAGNOSIS — I63.9 ISCHEMIC CEREBROVASCULAR ACCIDENT (CVA) (H): ICD-10-CM

## 2024-06-28 DIAGNOSIS — R73.03 PREDIABETES: ICD-10-CM

## 2024-06-28 DIAGNOSIS — I24.89 DEMAND ISCHEMIA (H): ICD-10-CM

## 2024-06-28 DIAGNOSIS — I10 BENIGN ESSENTIAL HYPERTENSION: ICD-10-CM

## 2024-06-28 DIAGNOSIS — E66.812 CLASS 2 SEVERE OBESITY DUE TO EXCESS CALORIES WITH SERIOUS COMORBIDITY AND BODY MASS INDEX (BMI) OF 39.0 TO 39.9 IN ADULT (H): ICD-10-CM

## 2024-06-28 DIAGNOSIS — R53.81 PHYSICAL DECONDITIONING: ICD-10-CM

## 2024-06-28 DIAGNOSIS — I50.20 HEART FAILURE WITH REDUCED EJECTION FRACTION, NYHA CLASS III (H): ICD-10-CM

## 2024-06-28 DIAGNOSIS — I50.23 ACUTE ON CHRONIC SYSTOLIC HEART FAILURE (H): ICD-10-CM

## 2024-06-28 DIAGNOSIS — D64.9 ANEMIA, UNSPECIFIED: ICD-10-CM

## 2024-06-28 DIAGNOSIS — R53.1 GENERALIZED WEAKNESS: ICD-10-CM

## 2024-06-28 DIAGNOSIS — M79.89 SWELLING OF LIMB: Primary | ICD-10-CM

## 2024-06-28 DIAGNOSIS — I42.9 CARDIOMYOPATHY, UNSPECIFIED TYPE (H): ICD-10-CM

## 2024-06-28 DIAGNOSIS — I63.9 ACUTE CVA (CEREBROVASCULAR ACCIDENT) (H): ICD-10-CM

## 2024-06-28 DIAGNOSIS — I51.3 LV (LEFT VENTRICULAR) MURAL THROMBUS: ICD-10-CM

## 2024-06-28 DIAGNOSIS — I21.4 NSTEMI (NON-ST ELEVATED MYOCARDIAL INFARCTION) (H): ICD-10-CM

## 2024-06-28 DIAGNOSIS — K59.00 CONSTIPATION, UNSPECIFIED CONSTIPATION TYPE: ICD-10-CM

## 2024-06-28 DIAGNOSIS — I24.0 LV (LEFT VENTRICULAR) MURAL THROMBUS WITHOUT MI (H): ICD-10-CM

## 2024-06-28 PROCEDURE — 99215 OFFICE O/P EST HI 40 MIN: CPT | Performed by: NURSE PRACTITIONER

## 2024-06-28 PROCEDURE — 99310 SBSQ NF CARE HIGH MDM 45: CPT | Performed by: NURSE PRACTITIONER

## 2024-06-28 RX ORDER — GABAPENTIN 100 MG/1
100 CAPSULE ORAL AT BEDTIME
Status: SHIPPED
Start: 2024-06-28 | End: 2024-07-01

## 2024-06-28 NOTE — PROGRESS NOTES
"[facility] GERIATRICS    Chief Complaint   Patient presents with    Hand Pain     HPI:  Cirilo Gardner is a 63 year old  (1960), who is being seen today for an episodic care visit at: [facility] ([facility]) [[id number]].    His past medical history includes  ischemic CVA of posterior left MCA due to LV thrombus  LV thrombus  Altered alertness improved with Keppra  Heart failure with reduced ejection fraction  NSTEMI due to demand ischemia  Prediabetes    Weight is down  Right hand swelling and redness improved  Walking with no problems  Has asphasia.     Allergies, and PMH/PSH reviewed in [facility] today.  REVIEW OF SYSTEMS:  4 point ROS including Respiratory, CV, GI and , other than that noted in the HPI,  is negative    Objective:   BP (!) 146/83   Pulse 66   Temp 96.8  F (36  C)   Resp 18   Ht 1.702 m (5' 7\")   Wt 110.2 kg (242 lb 14.4 oz)   SpO2 94%   BMI 38.04 kg/m    GENERAL APPEARANCE:  Alert, in no distress, aphasic  RESP:  lungs clear to auscultation , no respiratory distress  CV:  peripheral edema 1+ in BLE, rate-normal  PSYCH:  affect and mood normal    Most Recent 3 CBC's:  Recent Labs   Lab Test 06/26/24  0606 06/17/24  1808 06/16/24  0731   WBC 8.8 8.7 8.2   HGB 15.9 17.0 15.3   MCV 97 94 97    362 310     Most Recent 3 BMP's:  Recent Labs   Lab Test 06/27/24  0648 06/26/24  0606 06/20/24  1407 06/20/24  1150 06/20/24  0810    140  --   --  143   POTASSIUM 4.1 3.5 3.6  --  3.2*   CHLORIDE 105 104  --   --  103   CO2 21* 24  --   --  26   BUN 22.3 17.2  --   --  14.6   CR 0.88 0.95  --   --  0.88   ANIONGAP 14 12  --   --  14   PARISH 10.1 9.6  --   --  9.1   * 91  --  144* 103*     Most Recent 3 BNP's:  Recent Labs   Lab Test 06/27/24  0648 06/16/24  1413 06/12/24  1408   NTBNPI  --  3,229* 12,210*   NTBN 842  --   --        Assessment/Plan:  (I63.9) Ischemic cerebrovascular accident (CVA) (H)  (primary encounter diagnosis)  Comment: left hemispheric " infarcts, presumed embolic due to LV thrombus  L vertebral occlusion at origin on CTA.   Plan:   Right sling  [facility] Date & Time of Appointment: 6/26/ @3 :00 PM Phone Number: [phone] Address: [address]  Follow up with neurology on 8/13  Follow up with sleep medicine on 10/30     (I24.0) LV (left ventricular) mural thrombus without MI (H)  Comment: Echo revealed LV thrombus   Taking apixaban  Plan: follow with cardiology on 6/28   CBC and BMP      (R41.0) Confusion  Comment: was having some confusion. Started on Keppra  Plan: follow up with neurology      (I50.20) Heart failure with reduced ejection fraction, NYHA class III (H)  (I24.89) Demand ischemia (H)  (I21.4) NSTEMI (non-ST elevated myocardial infarction) (H)  Comment: EF 30-35%  Pt is decreasing weight and diuresing with lasix 40 mg daily   ACE/ARB/ARNI: Entresto 24/26 - had some issues with borderline hypotension middle dose Entresto during his hospitalization   SGLT2 - Jardiance  BB: coreg increased to 25 mg twice daily   Aldosterone Antagonist: spironolactone  SCD: not qualified at this time  Plan:   Daily weights  Follow up with cardiology in 2 months with a repeat echocardiogram beforehand   Consider increasing entresto     (R73.03) Prediabetes  Comment: chronic  Hgb A1c = 6.4 (6/12)   Blood sugars WNL  Plan: continue sugar alternating times     (E66.01,  Z68.39) Class 2 severe obesity due to excess calories with serious comorbidity and body mass index (BMI) of 39.0 to 39.9 in adult (H)  Comment: chronic  Plan: Continue with plan of care no changes at this time, adjustment as needed           (I10) Benign essential hypertension  Comment: chronic  On coreg, furosemide, spironolactone  BMP (6/27/24) WNL  Plan: Continue with plan of care no changes at this time, adjustment as needed       (R53.81) Physical deconditioning  (R53.1) Generalized weakness  Comment: asphasia   Right sided weakness  Working with therapy  Plan: Continue  with plan of care no changes at this time, adjustment as needed        (M79.89) Swelling of limb   Comment: right hand red and swollen -improving  Keflex discontinued - due to no improvement  Prednisone 20 mg by mouth x 3 days started  Ice elevation   Plan:Continue with plan of care no changes at this time, adjustment as needed           (K59.01) Slow transit constipation  Comment: no BM x 3-4 days  Plan: continue with Bowel medications      MED REC REQUIRED  Post Medication Reconciliation Status:  Medication reconciliation previously completed during another office visit      Electronically signed by:      DEBRA Javed CNP on 6/28/2024 at 2:30 PM        45 minutes was spent reviewing talking with pt's brother and answering their questions/concerns, coordinating above plan of care with nursing and patient and reviewed medications with patient/brother and counseled pt. on above plan of care.  Counseled on medications and side effects.

## 2024-06-28 NOTE — PATIENT INSTRUCTIONS
If you have questions or concerns please call the [facility] nurse team at [phone] or send a [employer] message (Mon-Fri 8am-4pm).  If you have concerns after hours, please call [phone], option 2 to speak with an on call Cardiologist.    1. Medication changes and/or recommendations from today: No changes.  2. Follow up plan: Follow up with me in about 2 months with a repeat echocardiogram beforehand to recheck the pumping function of the heart.   3. Continue to check your weights daily and try to stick to a low sodium diet (under 2,000 mg/day).  4. Call the [facility] nurse team at the number listed below if you develop signs concerning for fluid retention, including weight gain of over 2 pounds in 1 day or over 5 pounds in 1 week, increasing shortness of breath, or increasing swelling in the legs or abdomen.    It was a pleasure seeing you today!     EDBRA Mckeon, CNP  Nurse Practitioner  [facility]    Thank you for your visit with the [facility] today. [facility] stands for Cardiomy[facility].    The [facility] is a heart failure specialty clinic within the [facility] where you will work with specialized nurse practitioners, physician assistants, doctors, and registered nurses. They are dedicated to helping patients with heart failure to carefully adjust medications, receive education, and learn who and when to call if symptoms develop. They specialize in helping you better understand your condition, slow the progression of your disease, improve the length and quality of your life, help you detect future heart problems before they become life threatening, and avoid hospitalizations.

## 2024-06-28 NOTE — LETTER
" 6/28/2024      Cirilo Gardner  [address] [facility] [address]        [facility] GERIATRICS    Chief Complaint   Patient presents with     Hand Pain     HPI:  Cirilo Gardner is a 63 year old  (1960), who is being seen today for an episodic care visit at: [facility] ([facility]) [[id number]].    His past medical history includes  ischemic CVA of posterior left MCA due to LV thrombus  LV thrombus  Altered alertness improved with Keppra  Heart failure with reduced ejection fraction  NSTEMI due to demand ischemia  Prediabetes    Weight is down  Right hand swelling and redness improved  Walking with no problems  Has asphasia.     Allergies, and PMH/PSH reviewed in EPIC today.  REVIEW OF SYSTEMS:  4 point ROS including Respiratory, CV, GI and , other than that noted in the HPI,  is negative    Objective:   BP (!) 146/83   Pulse 66   Temp 96.8  F (36  C)   Resp 18   Ht 1.702 m (5' 7\")   Wt 110.2 kg (242 lb 14.4 oz)   SpO2 94%   BMI 38.04 kg/m    GENERAL APPEARANCE:  Alert, in no distress, aphasic  RESP:  lungs clear to auscultation , no respiratory distress  CV:  peripheral edema 1+ in BLE, rate-normal  PSYCH:  affect and mood normal    Most Recent 3 CBC's:  Recent Labs   Lab Test 06/26/24  0606 06/17/24  1808 06/16/24  0731   WBC 8.8 8.7 8.2   HGB 15.9 17.0 15.3   MCV 97 94 97    362 310     Most Recent 3 BMP's:  Recent Labs   Lab Test 06/27/24  0648 06/26/24  0606 06/20/24  1407 06/20/24  1150 06/20/24  0810    140  --   --  143   POTASSIUM 4.1 3.5 3.6  --  3.2*   CHLORIDE 105 104  --   --  103   CO2 21* 24  --   --  26   BUN 22.3 17.2  --   --  14.6   CR 0.88 0.95  --   --  0.88   ANIONGAP 14 12  --   --  14   PARISH 10.1 9.6  --   --  9.1   * 91  --  144* 103*     Most Recent 3 BNP's:  Recent Labs   Lab Test 06/27/24  0648 06/16/24  1413 06/12/24  1408   NTBNPI  --  3,229* 12,210*   NTBNP 842  --   --        Assessment/Plan:  (I63.9) Ischemic cerebrovascular " accident (CVA) (H)  (primary encounter diagnosis)  Comment: left hemispheric infarcts, presumed embolic due to LV thrombus  L vertebral occlusion at origin on CTA.   Plan:   Right sling  [facility] Date & Time of Appointment: 6/26/ @3 :00 PM Phone Number: [phone] Address: [address]  Follow up with neurology on 8/13  Follow up with sleep medicine on 10/30     (I24.0) LV (left ventricular) mural thrombus without MI (H)  Comment: Echo revealed LV thrombus   Taking apixaban  Plan: follow with cardiology on 6/28   CBC and BMP      (R41.0) Confusion  Comment: was having some confusion. Started on Keppra  Plan: follow up with neurology      (I50.20) Heart failure with reduced ejection fraction, NYHA class III (H)  (I24.89) Demand ischemia (H)  (I21.4) NSTEMI (non-ST elevated myocardial infarction) (H)  Comment: EF 30-35%  Pt is decreasing weight and diuresing with lasix 40 mg daily   ACE/ARB/ARNI: Entresto 24/26 - had some issues with borderline hypotension middle dose Entresto during his hospitalization   SGLT2 - Jardiance  BB: coreg increased to 25 mg twice daily   Aldosterone Antagonist: spironolactone  SCD: not qualified at this time  Plan:   Daily weights  Follow up with cardiology in 2 months with a repeat echocardiogram beforehand   Consider increasing entresto     (R73.03) Prediabetes  Comment: chronic  Hgb A1c = 6.4 (6/12)   Blood sugars WNL  Plan: continue sugar alternating times     (E66.01,  Z68.39) Class 2 severe obesity due to excess calories with serious comorbidity and body mass index (BMI) of 39.0 to 39.9 in adult (H)  Comment: chronic  Plan: Continue with plan of care no changes at this time, adjustment as needed           (I10) Benign essential hypertension  Comment: chronic  On coreg, furosemide, spironolactone  BMP (6/27/24) WNL  Plan: Continue with plan of care no changes at this time, adjustment as needed       (R53.81) Physical deconditioning  (R53.1) Generalized  weakness  Comment: asphasia   Right sided weakness  Working with therapy  Plan: Continue with plan of care no changes at this time, adjustment as needed        (M79.89) Swelling of limb   Comment: right hand red and swollen -improving  Keflex discontinued - due to no improvement  Prednisone 20 mg by mouth x 3 days started  Ice elevation   Plan:Continue with plan of care no changes at this time, adjustment as needed           (K59.01) Slow transit constipation  Comment: no BM x 3-4 days  Plan: continue with Bowel medications      MED REC REQUIRED  Post Medication Reconciliation Status:  Medication reconciliation previously completed during another office visit      Electronically signed by:      DEBRA Javed CNP on 6/28/2024 at 2:30 PM        45 minutes was spent reviewing talking with pt's brother and answering their questions/concerns, coordinating above plan of care with nursing and patient and reviewed medications with patient/brother and counseled pt. on above plan of care.  Counseled on medications and side effects.        Sincerely,        DEBRA Javed CNP

## 2024-06-28 NOTE — PROGRESS NOTES
Cardiology Clinic Progress Note    C.O.R.E. Clinic Visit (Heart Failure Specialty Clinic)    Service Date: June 28, 2024  Primary Cardiology Team: Dr. Pineda PEOPLES:   I had the pleasure of seeing Mr. Cirilo Gardner in the clinic today. he is a very pleasant 63 year old male with a past medical history notable for obesity and no other known medical history as he has not seen a doctor in many years. He was admitted to [facility] on 6/12/2024 after presenting with confusion and expressive/receptive aphasia along with a right visual cut and being found to have an acute left MCA stroke. Cardiology was consulted as the patient was found to have a new diagnosis of a cardiomyopathy with severe LV systolic dysfunction, ejection fraction 15-20% and LV apical thrombus on echocardiogram. He was diuresed on IV Lasix and started on guideline directed medical therapy as outlined below. CT calcium score was completed on 6/18/24 with a total Agatston calcium score is 23.4 placing the patient in the 37th percentile when compared to age and gender matched control group with mild coronary calcifications suggesting against ischemic etiology for his cardiomyopathy.  A repeat echo prior to discharge showed gradual improvement in his EF at 30-35%. He was discharged with a Zio patch monitor for further evaluation of the possibility of underlying atrial fibrillation contributing to his stroke and cardiomyopathy.  Final results are not available yet at this time. His discharge weight was 252 pounds, and his weight has continued to gradually trend down to 242 pounds in the clinic today on a diuretic regimen of p.o. Lasix at 40 mg once daily.     Today, Mr. Gardner presents to the clinic in follow-up of his recent hospitalization.  He has been staying at [facility]. Obtaining history is somewhat challenging as he comes in alone today and has continued expressive aphasia, but the patient tells me he has  overall been feeling well from a cardiac standpoint. He denies symptoms of shortness of breath, chest pain, or palpitations. He continues to have significant lower extremity edema, but feels that this is improving somewhat since he has was in the hospital. Labs were completed on 6/27/2024 in anticipation of the visit today showing stable electrolytes and renal function with a potassium level 4.1 and creatinine at 0.88.  N-terminal pro BNP level was within normal limits for his age at 842. Blood pressure was elevated initially at 178/108 in the clinic today, but the patient notes that he has not received his morning medications yet. I rechecked this manually and got a reading of 154/98.     ASSESSMENT:  Recently diagnosed cardiomyopathy and biventricular heart failure, likely with a component of diastolic heart failure as well  - LVEF 15-20% initially, improved to 30-35% but most recent TTE 6/19/24 with moderate to severely reduced RV systolic function and grade 3 diastolic dysfunction.   - Etiology: Unclear at this time--Suspected nonischemic based on CT coronary calcium scan findings  - Fluid status: Challenging to assess due to body habitus, but appears well compensated with NT pro BNP level within normal limits and weight continuing to trend down.  - Diuretic regimen: Lasix 40 mg once daily  - Ischemic evaluation: Not completed yet, but CT calcium score 6/18/24--Showing relatively low calcium score of 23.4 suggesting against occlusive CAD     Guideline directed medical therapy (GDMT):  - Beta blocker: Carvedilol 25 mg BID  - ACEI/ARB/ARNI: Entresto 24-26 mg BID   - Aldactone antagonist: Spironolactone 25 mg daily   - SGLT2 inhibitor: Jardiance 10 mg once daily.     LV apical thrombus measuring 1.5 x 1.9 cm. Initially started on warfarin, but transitioned to Eliquis prior to discharge.  Acute left MCA stroke likely due to problem #2  Residual expressive/receptive aphasia and confusion secondary to problem  #3  Obesity  Bilateral lower extremity edema    PLAN:  - Ideally would like to try to go up on his Entresto, but blood pressure at the TCU earlier this week was 114/66 and he had some issues with borderline hypotension middle dose Entresto during his hospitalization so we will hold off for now and continue current diuretic regimen and GDMT as outlined above without changes.  - Will await the final results of the Zio patch monitor and can update him on these results by phone.  - Follow-up in the [facility] in about 2 months with repeat echocardiogram beforehand for reassessment of his ejection fraction. If EF remains significantly reduced at that time, would consider cardiac MRI with stress for further ischemic evaluation and rule out other potential causes for his cardiomyopathy.  - Continue to check daily weights and try to stick to a low-sodium diet. Provided [facility] clinic folder with information regarding heart failure management and signs/symptoms to watch for for when to call the clinic.    Thank you for the opportunity to participate in this patient's care. We would be happy to see him sooner if needed for any concerns in the meantime.      40 total minutes was spent today including chart review, precharting, history and exam, post visit documentation, and reviewing studies as outlined above.     DEBRA Mckeon, CNP   Nurse Practitioner  [facility]  Pager: [phone]  Text Page  (8am - 5pm, M-F)    Orders this Visit:  Orders Placed This Encounter   Procedures    Follow-Up with Cardiology- [facility]    Echocardiogram Complete     No orders of the defined types were placed in this encounter.    There are no discontinued medications.  Encounter Diagnoses   Name Primary?    Acute CVA (cerebrovascular accident) (H)     Cardiomyopathy, unspecified type (H)     LV (left ventricular) mural thrombus     Acute on chronic systolic heart failure (H)        CURRENT MEDICATIONS:  Current Outpatient  Medications   Medication Sig Dispense Refill    apixaban ANTICOAGULANT (ELIQUIS) 5 MG tablet Take 1 tablet (5 mg) by mouth 2 times daily      atorvastatin (LIPITOR) 40 MG tablet Take 1 tablet (40 mg) by mouth every evening      carvedilol (COREG) 25 MG tablet Take 0.5 tablets (12.5 mg) by mouth 2 times daily (with meals) HOLD If sbp <110 or HR <60 (Patient taking differently: Take 25 mg by mouth 2 times daily (with meals) HOLD If sbp <110 or HR <60)      cephALEXin (KEFLEX) 500 MG capsule Take 1 capsule (500 mg) by mouth 4 times daily for 5 days 20 capsule 0    empagliflozin (JARDIANCE) 10 MG TABS tablet Take 1 tablet (10 mg) by mouth daily      furosemide (LASIX) 40 MG tablet Take 1 tablet (40 mg) by mouth daily Hold if sbp <100      gabapentin (NEURONTIN) 100 MG capsule Take 1 capsule (100 mg) by mouth at bedtime (Patient taking differently: Take 300 mg by mouth at bedtime)      insulin aspart (NOVOLOG PEN) 100 UNIT/ML pen Inject 1-7 Units Subcutaneous 3 times daily (before meals) Correction Scale - MEDIUM INSULIN RESISTANCE DOSING     Do Not give Correction Insulin if Pre-Meal BG less than 140.   For Pre-Meal  - 189 give 1 unit.   For Pre-Meal  - 239 give 2 units.   For Pre-Meal  - 289 give 3 units.   For Pre-Meal  - 339 give 4 units.   For Pre-Meal - 389 give 5 units.   For Pre-Meal -439 give 6 units  For Pre-Meal BG greater than or equal to 440 give 7 units.   To be given with prandial insulin, and based on pre-meal blood glucose. Administering insulin within 5 minutes of the start of the meal is ideal. Administer insulin no more than 30 minutes after the start of the meal, unless directed otherwise by provider.     Notify provider if glucose greater than or equal to 350 mg/dL after administration of correction dose.      insulin aspart (NOVOLOG PEN) 100 UNIT/ML pen Inject 1-5 Units Subcutaneous at bedtime MEDIUM INSULIN RESISTANCE DOSING    Do Not give Bedtime Correction  "Insulin if BG less than  200.   For  - 249 give 1 units.   For  - 299 give 2 units.   For  - 349 give 3 units.   For  -399 give 4 units.   For BG greater than or equal to 400 give 5 units.  Notify provider if glucose greater than or equal to 350 mg/dL after administration of correction dose.      levETIRAcetam (KEPPRA) 750 MG tablet Take 2 tablets (1,500 mg) by mouth 2 times daily      sacubitril-valsartan (ENTRESTO) 24-26 MG per tablet Take 1 tablet by mouth 2 times daily HOLD if sbp <105      spironolactone (ALDACTONE) 25 MG tablet Take 1 tablet (25 mg) by mouth daily HOLD if sbp <115         ALLERGIES  No Known Allergies    PAST MEDICAL, SURGICAL, FAMILY HISTORY:  History was reviewed and updated as needed, see medical record.    SOCIAL HISTORY:  Social History     Socioeconomic History    Marital status: Single     Spouse name: Not on file    Number of children: Not on file    Years of education: Not on file    Highest education level: Not on file   Occupational History    Not on file   Tobacco Use    Smoking status: Never    Smokeless tobacco: Never   Substance and Sexual Activity    Alcohol use: Not on file    Drug use: Not on file    Sexual activity: Not on file   Other Topics Concern    Not on file   Social History Narrative    Not on file     Social Determinants of Health     Financial Resource Strain: Not on file   Food Insecurity: Not on file   Transportation Needs: Not on file   Physical Activity: Not on file   Stress: Not on file   Social Connections: Not on file   Interpersonal Safety: Not on file   Housing Stability: Not on file     Review of Systems:  Focused cardiovascular and respiratory review of systems is negative other than the symptoms noted above in the HPI.     Physical Exam:  Vitals: BP (!) 154/98 (BP Location: Right arm, Patient Position: Sitting)   Pulse 81   Ht 1.753 m (5' 9\")   Wt 111.6 kg (246 lb)   SpO2 97%   BMI 36.33 kg/m     Wt Readings from Last 4 " Encounters:   06/28/24 110.2 kg (242 lb 14.4 oz)   06/28/24 111.6 kg (246 lb)   06/25/24 113 kg (249 lb 1.6 oz)   06/24/24 113.2 kg (249 lb 9.6 oz)     CONSTITUTIONAL: Alert and in no acute distress.  NECK: Thick neck.  Unable to visualize JVP due to body habitus.  CARDIOVASCULAR:  Regular rate and rhythm. No murmur, rub or gallop.   RESPIRATORY: Breathing non-labored. Lungs are clear to auscultation with no wheezes or crackles bilaterally.  GASTROINTESTINAL: Abdomen non-distended.  EXTREMITIES: Legs wrapped making it challenging to assess degree of edema, but appears to be around 1-2+ edema in the ankles bilaterally.   NEUROPSYCHIATRIC: Expressive aphasia. Affect appropriate.     Recent Lab Results:  LIPID RESULTS:  Lab Results   Component Value Date    CHOL 135 06/13/2024    HDL 41 06/13/2024    LDL 82 06/13/2024    TRIG 58 06/13/2024     CBC RESULTS:  Lab Results   Component Value Date    WBC 8.8 06/26/2024    RBC 5.18 06/26/2024    HGB 15.9 06/26/2024    HCT 50.2 06/26/2024    MCV 97 06/26/2024    MCH 30.7 06/26/2024    MCHC 31.7 06/26/2024    RDW 13.0 06/26/2024     06/26/2024     BMP RESULTS:  Lab Results   Component Value Date     06/27/2024    POTASSIUM 4.1 06/27/2024    CHLORIDE 105 06/27/2024    CO2 21 (L) 06/27/2024    ANIONGAP 14 06/27/2024     (H) 06/27/2024     (H) 06/20/2024    BUN 22.3 06/27/2024    CR 0.88 06/27/2024    GFRESTIMATED >90 06/27/2024    PARISH 10.1 06/27/2024      A1C RESULTS:  Lab Results   Component Value Date    A1C 6.4 (H) 06/12/2024     INR RESULTS:  Lab Results   Component Value Date    INR 1.41 (H) 06/19/2024    INR 1.28 (H) 06/18/2024     Please kindly note that this document was completed in part using Dragon voice recognition software. Although reviewed after completion, some word substitutions and typographical errors may occur. Please contact me if clarification is needed.

## 2024-07-01 ENCOUNTER — TRANSITIONAL CARE UNIT VISIT (OUTPATIENT)
Dept: GERIATRICS | Facility: CLINIC | Age: 64
End: 2024-07-01
Payer: COMMERCIAL

## 2024-07-01 VITALS
HEIGHT: 67 IN | TEMPERATURE: 97.5 F | BODY MASS INDEX: 37.7 KG/M2 | RESPIRATION RATE: 17 BRPM | DIASTOLIC BLOOD PRESSURE: 79 MMHG | SYSTOLIC BLOOD PRESSURE: 145 MMHG | HEART RATE: 66 BPM | WEIGHT: 240.2 LBS | OXYGEN SATURATION: 93 %

## 2024-07-01 DIAGNOSIS — G62.9 NEUROPATHY: ICD-10-CM

## 2024-07-01 DIAGNOSIS — I63.9 ACUTE CVA (CEREBROVASCULAR ACCIDENT) (H): Primary | ICD-10-CM

## 2024-07-01 DIAGNOSIS — I42.9 CARDIOMYOPATHY, UNSPECIFIED TYPE (H): ICD-10-CM

## 2024-07-01 LAB
ERYTHROCYTE [DISTWIDTH] IN BLOOD BY AUTOMATED COUNT: 12.6 % (ref 10–15)
HCT VFR BLD AUTO: 53.3 % (ref 40–53)
HGB BLD-MCNC: 17.2 G/DL (ref 13.3–17.7)
MCH RBC QN AUTO: 30.7 PG (ref 26.5–33)
MCHC RBC AUTO-ENTMCNC: 32.3 G/DL (ref 31.5–36.5)
MCV RBC AUTO: 95 FL (ref 78–100)
PLATELET # BLD AUTO: 530 10E3/UL (ref 150–450)
RBC # BLD AUTO: 5.6 10E6/UL (ref 4.4–5.9)
WBC # BLD AUTO: 8.5 10E3/UL (ref 4–11)

## 2024-07-01 PROCEDURE — 99310 SBSQ NF CARE HIGH MDM 45: CPT | Performed by: NURSE PRACTITIONER

## 2024-07-01 PROCEDURE — P9604 ONE-WAY ALLOW PRORATED TRIP: HCPCS | Mod: ORL | Performed by: NURSE PRACTITIONER

## 2024-07-01 PROCEDURE — 36415 COLL VENOUS BLD VENIPUNCTURE: CPT | Mod: ORL | Performed by: NURSE PRACTITIONER

## 2024-07-01 PROCEDURE — 85027 COMPLETE CBC AUTOMATED: CPT | Mod: ORL | Performed by: NURSE PRACTITIONER

## 2024-07-01 RX ORDER — CITALOPRAM HYDROBROMIDE 10 MG/1
10 TABLET ORAL DAILY
Status: SHIPPED
Start: 2024-07-01 | End: 2024-07-01

## 2024-07-01 RX ORDER — CITALOPRAM HYDROBROMIDE 10 MG/1
10 TABLET ORAL DAILY
Status: SHIPPED
Start: 2024-07-01

## 2024-07-01 RX ORDER — SACUBITRIL AND VALSARTAN 49; 51 MG/1; MG/1
1 TABLET, FILM COATED ORAL 2 TIMES DAILY
Status: SHIPPED
Start: 2024-07-01

## 2024-07-01 RX ORDER — GABAPENTIN 100 MG/1
100 CAPSULE ORAL AT BEDTIME
Status: SHIPPED
Start: 2024-07-01

## 2024-07-01 RX ORDER — ACETAMINOPHEN 500 MG
1000 TABLET ORAL 3 TIMES DAILY
COMMUNITY

## 2024-07-01 RX ORDER — CARVEDILOL 25 MG/1
25 TABLET ORAL 2 TIMES DAILY WITH MEALS
Status: SHIPPED
Start: 2024-07-01 | End: 2024-07-16

## 2024-07-01 NOTE — LETTER
7/1/2024      Cirilo Gardner  [address] [facility] [address]        [facility] GERIATRICS    Chief Complaint   Patient presents with     RECHECK     HPI:  Cirilo Gardner is a 63 year old  (1960), who is being seen today for an episodic care visit at: [facility] ([facility]) [[id number]].    His past medical history includes  ischemic CVA of posterior left MCA due to LV thrombus  LV thrombus  Altered alertness improved with Keppra  Heart failure with reduced ejection fraction  NSTEMI due to demand ischemia  Prediabetes    6/12/2024, patient presented with confusion and expressive/receptive language deficits.  He was found to have a LV thrombus.  He was started on Lovenox and warfarin.  [facility]  He was found to have evidence of L vertebral occlusion at the origin of the CTA.  On 6/17 he started having new symptoms and code stroke was called. MRI with evidnec of new L anterior borderzone infarct. He continued to have fluctuating neuro exam on 6/18 that was out of proportion to new infarct burden. Neuro unclear if symptoms were d/t new ischemia or localization seizures. He was started on keppra with improvement of symptoms even though no clear seizures captured on EEG. Neuro recommends that he continue on keppra at time of discharge until clinic follow-up.   His echo revealed an EF of 15-20% with LV apical thrombus.  He was started on medical management.  He was unable to complete a chest CTA due to his inability to hold his arms above his head for the needed time.  His CT coronary calcium score was 23 which was low and no need for inpatient coronary angiogram.     6/24/2024, patient was treated for cellulitis on right hand with Keflex there was no improvement    6/25/2024, patient Keflex was discontinued was started on prednisone    On 6/28/2024, patient saw cardiology with no new orders      Today weight is down, blood pressures are  "controlled/high  Per nursing,  Patient requires minimal Ax1 with bathing and lower body dressing. CGA of upper body dressing, grooming, oral care, toileting, ambulation, and transfer. Minimal Ax1 with bed mobility and requires only setup meals able to feed self.    Continent of both B & B.   Lymphedema dressing on M-W-F.          Allergies, and PMH/PSH reviewed in EPIC today.  REVIEW OF SYSTEMS:  4 point ROS including Respiratory, CV, GI and , other than that noted in the HPI,  is negative    Objective:   BP (!) 145/79   Pulse 66   Temp 97.5  F (36.4  C)   Resp 17   Ht 1.702 m (5' 7\")   Wt 109 kg (240 lb 3.2 oz)   SpO2 93%   BMI 37.62 kg/m    GENERAL APPEARANCE:  Alert, in no distress, aphasic  RESP:  lungs clear to auscultation , no respiratory distress  CV:  peripheral edema 1+ in BLE, rate-normal  PSYCH:  affect and mood normal    Most Recent 3 CBC's:  Recent Labs   Lab Test 07/01/24  0804 06/26/24  0606 06/17/24  1808   WBC 8.5 8.8 8.7   HGB 17.2 15.9 17.0   MCV 95 97 94   * 391 362     Most Recent 3 BMP's:  Recent Labs   Lab Test 06/27/24  0648 06/26/24  0606 06/20/24  1407 06/20/24  1150 06/20/24  0810    140  --   --  143   POTASSIUM 4.1 3.5 3.6  --  3.2*   CHLORIDE 105 104  --   --  103   CO2 21* 24  --   --  26   BUN 22.3 17.2  --   --  14.6   CR 0.88 0.95  --   --  0.88   ANIONGAP 14 12  --   --  14   PARISH 10.1 9.6  --   --  9.1   * 91  --  144* 103*     Most Recent 3 BNP's:  Recent Labs   Lab Test 06/27/24  0648 06/16/24  1413 06/12/24  1408   NTBNPI  --  3,229* 12,210*   NTBNP 842  --   --        Assessment/Plan:  (I63.9) Ischemic cerebrovascular accident (CVA) (H)  (primary encounter diagnosis)  Comment: left hemispheric infarcts, presumed embolic due to LV thrombus  L vertebral occlusion at origin on CTA.   Plan:   Start celexa - ECG in 2 weeks  [facility] Date & Time of Appointment: 6/26/ @3 :00 PM Phone Number: [phone] Address: [facility] [address]  Follow up " with neurology on 8/13  Follow up with sleep medicine on 10/30     (I24.0) LV (left ventricular) mural thrombus without MI (H)  Comment: Echo revealed LV thrombus   Taking apixaban  Saw cardiology on 6/28  CBC WNL  (07/01/24)  Plan: Continue with plan of care no changes at this time, adjustment as needed      (R41.0) Confusion  Comment: was having some confusion. Started on Keppra  Plan: follow up with neurology      (I50.20) Heart failure with reduced ejection fraction, NYHA class III (H)  (I24.89) Demand ischemia (H)  (I21.4) NSTEMI (non-ST elevated myocardial infarction) (H)  Comment: EF 30-35%  Pt is decreasing weight and diuresing with lasix 40 mg daily   ACE/ARB/ARNI: Entresto 24/26 - had some issues with borderline hypotension middle dose Entresto during his hospitalization   SGLT2 - Jardiance  BB: coreg increased to 25 mg twice daily   Aldosterone Antagonist: spironolactone  SCD: not qualified at this time  Plan:   Daily weights  Follow up with cardiology in 2 months with a repeat echocardiogram beforehand   Increase entresto  BMP on 7/15     (R73.03) Prediabetes  Comment: chronic  Hgb A1c = 6.4 (6/12)   Blood sugars WNL  Plan: continue sugar alternating times     (E66.01,  Z68.39) Class 2 severe obesity due to excess calories with serious comorbidity and body mass index (BMI) of 39.0 to 39.9 in adult (H)  Comment: chronic  Plan: Continue with plan of care no changes at this time, adjustment as needed           (I10) Benign essential hypertension  Comment: chronic  On coreg, furosemide, spironolactone  BMP (6/27/24) WNL  Plan: Elevated increase entresto       (R53.81) Physical deconditioning  (R53.1) Generalized weakness  Comment: asphasia   Right sided weakness  Working with therapy  Plan: Continue with plan of care no changes at this time, adjustment as needed        (M79.89) Swelling of limb   Comment: right hand red and swollen -- resolved  Keflex discontinued - due to no improvement  Prednisone 20 mg by  mouth x 3 days started  Ice elevation   Plan:Continue with plan of care no changes at this time, adjustment as needed           (K59.01) Slow transit constipation  Comment: chronic  Plan: continue with Bowel medications      MED REC REQUIRED  Post Medication Reconciliation Status:  Medication reconciliation previously completed during another office visit      Electronically signed by:    DEBRA Javed CNP on 7/1/2024 at 8:30 PM        45 minutes was spent reviewing medical record, assessing patient, coordinating above plan of care with nursing , SW, therapy and dietitian and patient and reviewed medications with patient and counseled pt. on above plan of care.  Counseled on medications and side effects.   Complex medical decision making completed during visit.       Sincerely,        DEBRA Javed CNP

## 2024-07-01 NOTE — PROGRESS NOTES
[facility] GERIATRICS    Chief Complaint   Patient presents with    RECHECK     HPI:  Cirilo Gardner is a 63 year old  (1960), who is being seen today for an episodic care visit at: [facility] ([facility]) [[id number]].    His past medical history includes  ischemic CVA of posterior left MCA due to LV thrombus  LV thrombus  Altered alertness improved with Keppra  Heart failure with reduced ejection fraction  NSTEMI due to demand ischemia  Prediabetes    6/12/2024, patient presented with confusion and expressive/receptive language deficits.  He was found to have a LV thrombus.  He was started on Lovenox and warfarin.  He was transition to Eliquis upon discharge.  He was found to have evidence of L vertebral occlusion at the origin of the CTA.  On 6/17 he started having new symptoms and code stroke was called. MRI with evidnec of new L anterior borderzone infarct. He continued to have fluctuating neuro exam on 6/18 that was out of proportion to new infarct burden. Neuro unclear if symptoms were d/t new ischemia or localization seizures. He was started on keppra with improvement of symptoms even though no clear seizures captured on EEG. Neuro recommends that he continue on keppra at time of discharge until clinic follow-up.   His echo revealed an EF of 15-20% with LV apical thrombus.  He was started on medical management.  He was unable to complete a chest CTA due to his inability to hold his arms above his head for the needed time.  His CT coronary calcium score was 23 which was low and no need for inpatient coronary angiogram.     6/24/2024, patient was treated for cellulitis on right hand with Keflex there was no improvement    6/25/2024, patient Keflex was discontinued was started on prednisone    On 6/28/2024, patient saw cardiology with no new orders      Today weight is down, blood pressures are controlled/high  Per nursing,  Patient requires minimal Ax1 with bathing and lower body  "dressing. CGA of upper body dressing, grooming, oral care, toileting, ambulation, and transfer. Minimal Ax1 with bed mobility and requires only setup meals able to feed self.    Continent of both B & B.   Lymphedema dressing on M-W-F.          Allergies, and PMH/PSH reviewed in EPIC today.  REVIEW OF SYSTEMS:  4 point ROS including Respiratory, CV, GI and , other than that noted in the HPI,  is negative    Objective:   BP (!) 145/79   Pulse 66   Temp 97.5  F (36.4  C)   Resp 17   Ht 1.702 m (5' 7\")   Wt 109 kg (240 lb 3.2 oz)   SpO2 93%   BMI 37.62 kg/m    GENERAL APPEARANCE:  Alert, in no distress, aphasic  RESP:  lungs clear to auscultation , no respiratory distress  CV:  peripheral edema 1+ in BLE, rate-normal  PSYCH:  affect and mood normal    Most Recent 3 CBC's:  Recent Labs   Lab Test 07/01/24  0804 06/26/24  0606 06/17/24  1808   WBC 8.5 8.8 8.7   HGB 17.2 15.9 17.0   MCV 95 97 94   * 391 362     Most Recent 3 BMP's:  Recent Labs   Lab Test 06/27/24  0648 06/26/24  0606 06/20/24  1407 06/20/24  1150 06/20/24  0810    140  --   --  143   POTASSIUM 4.1 3.5 3.6  --  3.2*   CHLORIDE 105 104  --   --  103   CO2 21* 24  --   --  26   BUN 22.3 17.2  --   --  14.6   CR 0.88 0.95  --   --  0.88   ANIONGAP 14 12  --   --  14   PARISH 10.1 9.6  --   --  9.1   * 91  --  144* 103*     Most Recent 3 BNP's:  Recent Labs   Lab Test 06/27/24  0648 06/16/24  1413 06/12/24  1408   NTBNPI  --  3,229* 12,210*   NTBNP 842  --   --        Assessment/Plan:  (I63.9) Ischemic cerebrovascular accident (CVA) (H)  (primary encounter diagnosis)  Comment: left hemispheric infarcts, presumed embolic due to LV thrombus  L vertebral occlusion at origin on CTA.   Plan:   Start celexa - ECG in 2 weeks  [facility] Date & Time of Appointment: 6/26/ @3 :00 PM Phone Number: [phone] Address: [address]  Follow up with neurology on 8/13  Follow up with sleep medicine on 10/30     (I24.0) LV (left " ventricular) mural thrombus without MI (H)  Comment: Echo revealed LV thrombus   Taking apixaban  Saw cardiology on 6/28  CBC WNL  (07/01/24)  Plan: Continue with plan of care no changes at this time, adjustment as needed      (R41.0) Confusion  Comment: was having some confusion. Started on Keppra  Plan: follow up with neurology      (I50.20) Heart failure with reduced ejection fraction, NYHA class III (H)  (I24.89) Demand ischemia (H)  (I21.4) NSTEMI (non-ST elevated myocardial infarction) (H)  Comment: EF 30-35%  Pt is decreasing weight and diuresing with lasix 40 mg daily   ACE/ARB/ARNI: Entresto 24/26 - had some issues with borderline hypotension middle dose Entresto during his hospitalization   SGLT2 - Jardiance  BB: coreg increased to 25 mg twice daily   Aldosterone Antagonist: spironolactone  SCD: not qualified at this time  Plan:   Daily weights  Follow up with cardiology in 2 months with a repeat echocardiogram beforehand   Increase entresto  BMP on 7/15     (R73.03) Prediabetes  Comment: chronic  Hgb A1c = 6.4 (6/12)   Blood sugars WNL  Plan: continue sugar alternating times     (E66.01,  Z68.39) Class 2 severe obesity due to excess calories with serious comorbidity and body mass index (BMI) of 39.0 to 39.9 in adult (H)  Comment: chronic  Plan: Continue with plan of care no changes at this time, adjustment as needed           (I10) Benign essential hypertension  Comment: chronic  On coreg, furosemide, spironolactone  BMP (6/27/24) WNL  Plan: Elevated increase entresto       (R53.81) Physical deconditioning  (R53.1) Generalized weakness  Comment: asphasia   Right sided weakness  Working with therapy  Plan: Continue with plan of care no changes at this time, adjustment as needed        (M79.89) Swelling of limb   Comment: right hand red and swollen -- resolved  Keflex discontinued - due to no improvement  Prednisone 20 mg by mouth x 3 days started  Ice elevation   Plan:Continue with plan of care no changes  at this time, adjustment as needed           (K59.01) Slow transit constipation  Comment: chronic  Plan: continue with Bowel medications      MED REC REQUIRED  Post Medication Reconciliation Status:  Medication reconciliation previously completed during another office visit      Electronically signed by:    DEBRA Javed CNP on 7/1/2024 at 8:30 PM        45 minutes was spent reviewing medical record, assessing patient, coordinating above plan of care with nursing , SW, therapy and dietitian and patient and reviewed medications with patient and counseled pt. on above plan of care.  Counseled on medications and side effects.   Complex medical decision making completed during visit.

## 2024-07-02 VITALS
BODY MASS INDEX: 37.67 KG/M2 | TEMPERATURE: 97.5 F | HEART RATE: 75 BPM | RESPIRATION RATE: 17 BRPM | HEIGHT: 67 IN | WEIGHT: 240 LBS | DIASTOLIC BLOOD PRESSURE: 73 MMHG | OXYGEN SATURATION: 93 % | SYSTOLIC BLOOD PRESSURE: 121 MMHG

## 2024-07-03 ENCOUNTER — TRANSITIONAL CARE UNIT VISIT (OUTPATIENT)
Dept: GERIATRICS | Facility: CLINIC | Age: 64
End: 2024-07-03
Payer: COMMERCIAL

## 2024-07-03 DIAGNOSIS — G40.909 SEIZURE DISORDER (H): ICD-10-CM

## 2024-07-03 DIAGNOSIS — I50.20 HEART FAILURE WITH REDUCED EJECTION FRACTION, NYHA CLASS III (H): ICD-10-CM

## 2024-07-03 DIAGNOSIS — I24.0 LV (LEFT VENTRICULAR) MURAL THROMBUS WITHOUT MI (H): ICD-10-CM

## 2024-07-03 DIAGNOSIS — I63.9 ACUTE CVA (CEREBROVASCULAR ACCIDENT) (H): Primary | ICD-10-CM

## 2024-07-03 DIAGNOSIS — R73.03 PREDIABETES: ICD-10-CM

## 2024-07-03 DIAGNOSIS — I24.89 DEMAND ISCHEMIA (H): ICD-10-CM

## 2024-07-03 PROCEDURE — 99305 1ST NF CARE MODERATE MDM 35: CPT | Performed by: INTERNAL MEDICINE

## 2024-07-03 NOTE — PROGRESS NOTES
Cirilo Gardner is a 63 year old male seen July 3, 2024 at [facility] where he was admitted after [facility] hospitalization 6/12-20 for acute left MCA territory CVA, manifested by expressive aphasia.  Presented with sudden confusion and language difficulty.   Found to have left vertebral occlusion at the CTA origin.    Workup revealed a new CHF with EF 15-20% on first ECHO, and a left ventricle thrombus.   He was started on apixaban and failure regimen.   Repeat ECHO showed improvement of EF to 30-35%      Also seen by Neurology for fluctuating MS and neurologic symptoms   Started on Keppra and these symptoms improved, although no clear sz seen on EEG.     Today pt is seen in his room sitting edge of bed.   He understands speech, and able to give one word answers fairly reliably.   Reports he is feeling well, denies any pain.   LE edema a little better than baseline.    Working with therapies and feels like he is improving.   Pt not receiving medical care prior to his stroke.       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   Non smoker     Review Of Systems  Skin: negative   Eyes: impaired vision, visual field cut  Ears/Nose/Throat: hearing loss  Respiratory: No shortness of breath, dyspnea on exertion, cough, or hemoptysis  Cardiovascular: as above  Gastrointestinal: negative  Genitourinary: negative  Musculoskeletal: ambulatory without device prior to his stroke   Now assist of one for transfers, ambulates 500' without assistive device  Needs assist with ADLs and lymphedema wraps   Right hand swelling resolved with course of prednisone and ice     Neurologic: expressive aphasia  Psychiatric: negative  Hematologic/Lymphatic/Immunologic: negative  Endocrine:  pre-DM  Wt Readings from Last 5  "Encounters:   07/02/24 108.9 kg (240 lb)   07/01/24 109 kg (240 lb 3.2 oz)   06/28/24 110.2 kg (242 lb 14.4 oz)   06/28/24 111.6 kg (246 lb)   06/25/24 113 kg (249 lb 1.6 oz)      GENERAL APPEARANCE: alert and no distress  /73   Pulse 75   Temp 97.5  F (36.4  C)   Resp 17   Ht 1.702 m (5' 7\")   Wt 108.9 kg (240 lb)   SpO2 93%   BMI 37.59 kg/m     HEENT: normocephalic, no lesion or abnormalities  NECK: no adenopathy, no asymmetry, masses, or scars and thyroid normal to palpation  RESP: lungs clear to auscultation - no rales, rhonchi or wheezes  CV: regular rate and rhythm, normal S1 S2  ABDOMEN: obese, soft, nontender, no HSM or masses and bowel sounds normal  MS: extremities normal- 2+ LE edema, wearing LE wraps     SKIN: no suspicious lesions or rashes  NEURO: expressive aphasia  PSYCH: affect okay, pleasant   LYMPHATICS: No cervical,  or supraclavicular nodes     Lab Results   Component Value Date     06/27/2024    POTASSIUM 4.1 06/27/2024    CHLORIDE 105 06/27/2024    CO2 21 (L) 06/27/2024    ANIONGAP 14 06/27/2024     (H) 06/27/2024    BUN 22.3 06/27/2024    CR 0.88 06/27/2024    GFRESTIMATED >90 06/27/2024    PARISH 10.1 06/27/2024     Lab Results   Component Value Date    WBC 8.5 07/01/2024      HGB 17.2 07/01/2024      MCV 95 07/01/2024       07/01/2024      MR BRAIN W/O and W CONTRAST  6/17/2024  INDICATION: NEw right sided weakness and aphasia in patient with LV thrombus previously neuro intact with Left MCA stroke.  INTRACRANIAL CONTENTS:  New small area of acute infarct in the left superior and middle frontal gyri (anterior border zone) compared to 6/14/2024. Remaining area of infarct has not significantly changed. Interval progression of local mass effect in the subacute left anterior and posterior border zone infarcts with similar appearance of petechial hemorrhage. No maged hematoma. No significant midline shift. No hydrocephalus. Normal position of the cerebellar tonsils. "  The area of subacute infarct demonstrates reactive enhancement as well as intrinsic T1 shortening.                                                        IMPRESSION:  1.  New small left anterior border zone infarct along the posterior margin of prior infarct compared to 6/14/2024.  2.  Similar distribution of the remaining subacute infarcts with petechial hemorrhage in the left posterior border zone. Slight progressed local mass effect without significant midline shift or maged hematoma formation.    CT CALCIUM SCREENING  6/18/2024   Clinical Information: New cardiomyopathy. LV thrombus   IMPRESSIONS:  1.  Mild coronary calcifications.  2.  The total Agatston calcium score is 23.4 placing the patient in the 37th percentile when compared to age and gender matched control group.  3.  Recommend aggressive risk factor modification.    ECHO 6/19/2024   The visual ejection fraction is 30-35%. Left ventricular systolic function is moderately reduced.  No evidence of left ventricular mass or tumors  Compared to previously the left ventricular systolic function appears to be  marginally better. The left ventricle however measures larger on this study than previously.  The right ventricular systolic function is moderate to severely reduced.  Grade III or advanced diastolic dysfunction.  The study was technically difficult. The study was technically limited.      IMP/PLAN:   (I63.9) Acute CVA (cerebrovascular accident) (H)    Comment: with expressive aphasia   Stuttering course in the hospital, repeat imaging as above pending  Ziopatch results   Plan: atorvastatin 40 mg/day   PHYSICAL THERAPY /OCCUPATIONAL THERAPY /Speech Therapy for stroke rehab   Discharge goal is return home alone, prior level of independence     (I24.0) LV (left ventricular) mural thrombus without MI (H)  Comment: in setting of very low EF   Plan: apixaban 5 mg bid     (I50.20) Heart failure with reduced ejection fraction, NYHA class III (H)  Comment: EF  improved with tx  Presumed nonischemic cardiomyopathy  Plan: carvedilol 25 mg bid, empaglifozin 10 mg/day, Entresto 49/51 bid, spironolactone 25 mg/day   Furosemide 40 mg/day   Compression, elevation of LEs  Follow weights, exam and BMP  Cardiology and Sleep study follow up pending       (I24.89) Demand ischemia (H)  Comment: NSTEMI at presentation   Plan: angiogram deferred given lower calcium score c/w nonobstructive CAD    (R73.03) Prediabetes  Comment: BMI 37   Lab Results   Component Value Date    A1C 6.4 06/12/2024      Plan: diet controlled   Dietician to see for diabetic education and calorie reduction     (G40.699) Seizure disorder (H)  Comment: EEG negative but improved with tx  Plan: continue Keppra 1500 mg bid   Neurology follow up as scheduled        Luciana Denton MD

## 2024-07-03 NOTE — LETTER
7/3/2024      Cirilo Gardner  [address] [facility] [address]        Cirilo Gardner is a 63 year old male seen July 3, 2024 at [facility] where he was admitted after [facility] hospitalization 6/12-20 for acute left MCA territory CVA, manifested by expressive aphasia.  Presented with sudden confusion and language difficulty.   Found to have left vertebral occlusion at the CTA origin.    Workup revealed a new CHF with EF 15-20% on first ECHO, and a left ventricle thrombus.   He was started on apixaban and failure regimen.   Repeat ECHO showed improvement of EF to 30-35%      Also seen by Neurology for fluctuating MS and neurologic symptoms   Started on Keppra and these symptoms improved, although no clear sz seen on EEG.     Today pt is seen in his room sitting edge of bed.   He understands speech, and able to give one word answers fairly reliably.   Reports he is feeling well, denies any pain.   LE edema a little better than baseline.    Working with therapies and feels like he is improving.   Pt not receiving medical care prior to his stroke.       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   Non smoker     Review Of Systems  Skin: negative   Eyes: impaired vision, visual field cut  Ears/Nose/Throat: hearing loss  Respiratory: No shortness of breath, dyspnea on exertion, cough, or hemoptysis  Cardiovascular: as above  Gastrointestinal: negative  Genitourinary: negative  Musculoskeletal: ambulatory without device prior to his stroke   Now assist of one for transfers, ambulates 500' without assistive device  Needs assist with ADLs and lymphedema wraps   Right hand swelling resolved with course of prednisone and ice     Neurologic: expressive aphasia  Psychiatric:  "negative  Hematologic/Lymphatic/Immunologic: negative  Endocrine:  pre-DM  Wt Readings from Last 5 Encounters:   07/02/24 108.9 kg (240 lb)   07/01/24 109 kg (240 lb 3.2 oz)   06/28/24 110.2 kg (242 lb 14.4 oz)   06/28/24 111.6 kg (246 lb)   06/25/24 113 kg (249 lb 1.6 oz)      GENERAL APPEARANCE: alert and no distress  /73   Pulse 75   Temp 97.5  F (36.4  C)   Resp 17   Ht 1.702 m (5' 7\")   Wt 108.9 kg (240 lb)   SpO2 93%   BMI 37.59 kg/m     HEENT: normocephalic, no lesion or abnormalities  NECK: no adenopathy, no asymmetry, masses, or scars and thyroid normal to palpation  RESP: lungs clear to auscultation - no rales, rhonchi or wheezes  CV: regular rate and rhythm, normal S1 S2  ABDOMEN: obese, soft, nontender, no HSM or masses and bowel sounds normal  MS: extremities normal- 2+ LE edema, wearing LE wraps     SKIN: no suspicious lesions or rashes  NEURO: expressive aphasia  PSYCH: affect okay, pleasant   LYMPHATICS: No cervical,  or supraclavicular nodes     Lab Results   Component Value Date     06/27/2024    POTASSIUM 4.1 06/27/2024    CHLORIDE 105 06/27/2024    CO2 21 (L) 06/27/2024    ANIONGAP 14 06/27/2024     (H) 06/27/2024    BUN 22.3 06/27/2024    CR 0.88 06/27/2024    GFRESTIMATED >90 06/27/2024    PARISH 10.1 06/27/2024     Lab Results   Component Value Date    WBC 8.5 07/01/2024      HGB 17.2 07/01/2024      MCV 95 07/01/2024       07/01/2024      MR BRAIN W/O and W CONTRAST  6/17/2024  INDICATION: NEw right sided weakness and aphasia in patient with LV thrombus previously neuro intact with Left MCA stroke.  INTRACRANIAL CONTENTS:  New small area of acute infarct in the left superior and middle frontal gyri (anterior border zone) compared to 6/14/2024. Remaining area of infarct has not significantly changed. Interval progression of local mass effect in the subacute left anterior and posterior border zone infarcts with similar appearance of petechial hemorrhage. No maged " hematoma. No significant midline shift. No hydrocephalus. Normal position of the cerebellar tonsils.  The area of subacute infarct demonstrates reactive enhancement as well as intrinsic T1 shortening.                                                        IMPRESSION:  1.  New small left anterior border zone infarct along the posterior margin of prior infarct compared to 6/14/2024.  2.  Similar distribution of the remaining subacute infarcts with petechial hemorrhage in the left posterior border zone. Slight progressed local mass effect without significant midline shift or maged hematoma formation.    CT CALCIUM SCREENING  6/18/2024   Clinical Information: New cardiomyopathy. LV thrombus   IMPRESSIONS:  1.  Mild coronary calcifications.  2.  The total Agatston calcium score is 23.4 placing the patient in the 37th percentile when compared to age and gender matched control group.  3.  Recommend aggressive risk factor modification.    ECHO 6/19/2024   The visual ejection fraction is 30-35%. Left ventricular systolic function is moderately reduced.  No evidence of left ventricular mass or tumors  Compared to previously the left ventricular systolic function appears to be  marginally better. The left ventricle however measures larger on this study than previously.  The right ventricular systolic function is moderate to severely reduced.  Grade III or advanced diastolic dysfunction.  The study was technically difficult. The study was technically limited.      IMP/PLAN:   (I63.9) Acute CVA (cerebrovascular accident) (H)    Comment: with expressive aphasia   Stuttering course in the hospital, repeat imaging as above pending  Ziopatch results   Plan: atorvastatin 40 mg/day   PHYSICAL THERAPY /OCCUPATIONAL THERAPY /Speech Therapy for stroke rehab   Discharge goal is return home alone, prior level of independence     (I24.0) LV (left ventricular) mural thrombus without MI (H)  Comment: in setting of very low EF   Plan: apixaban  5 mg bid     (I50.20) Heart failure with reduced ejection fraction, NYHA class III (H)  Comment: EF improved with tx  Presumed nonischemic cardiomyopathy  Plan: carvedilol 25 mg bid, empaglifozin 10 mg/day, Entresto 49/51 bid, spironolactone 25 mg/day   Furosemide 40 mg/day   Compression, elevation of LEs  Follow weights, exam and BMP  Cardiology and Sleep study follow up pending       (I24.89) Demand ischemia (H)  Comment: NSTEMI at presentation   Plan: angiogram deferred given lower calcium score c/w nonobstructive CAD    (R73.03) Prediabetes  Comment: BMI 37   Lab Results   Component Value Date    A1C 6.4 06/12/2024      Plan: diet controlled   Dietician to see for diabetic education and calorie reduction     (G40.909) Seizure disorder (H)  Comment: EEG negative but improved with tx  Plan: continue Keppra 1500 mg bid   Neurology follow up as scheduled        Luciana Denton MD       Sincerely,        Luciana Denton MD

## 2024-07-08 RX ORDER — AMOXICILLIN 250 MG
1 CAPSULE ORAL 2 TIMES DAILY
COMMUNITY

## 2024-07-09 ENCOUNTER — TRANSITIONAL CARE UNIT VISIT (OUTPATIENT)
Dept: GERIATRICS | Facility: CLINIC | Age: 64
End: 2024-07-09
Payer: COMMERCIAL

## 2024-07-09 VITALS
TEMPERATURE: 97.7 F | SYSTOLIC BLOOD PRESSURE: 149 MMHG | RESPIRATION RATE: 17 BRPM | WEIGHT: 230 LBS | BODY MASS INDEX: 36.1 KG/M2 | DIASTOLIC BLOOD PRESSURE: 76 MMHG | HEART RATE: 66 BPM | OXYGEN SATURATION: 92 % | HEIGHT: 67 IN

## 2024-07-09 DIAGNOSIS — I24.0 LV (LEFT VENTRICULAR) MURAL THROMBUS WITHOUT MI (H): Primary | ICD-10-CM

## 2024-07-09 DIAGNOSIS — I69.320 APHASIA AS LATE EFFECT OF CEREBROVASCULAR ACCIDENT (CVA): ICD-10-CM

## 2024-07-09 DIAGNOSIS — K59.01 SLOW TRANSIT CONSTIPATION: ICD-10-CM

## 2024-07-09 DIAGNOSIS — I42.9 CARDIOMYOPATHY, UNSPECIFIED TYPE (H): ICD-10-CM

## 2024-07-09 DIAGNOSIS — I50.20 HEART FAILURE WITH REDUCED EJECTION FRACTION, NYHA CLASS III (H): ICD-10-CM

## 2024-07-09 DIAGNOSIS — L03.113 CELLULITIS OF RIGHT UPPER EXTREMITY: ICD-10-CM

## 2024-07-09 DIAGNOSIS — K59.00 CONSTIPATION, UNSPECIFIED CONSTIPATION TYPE: ICD-10-CM

## 2024-07-09 DIAGNOSIS — I63.9 ISCHEMIC CEREBROVASCULAR ACCIDENT (CVA) (H): ICD-10-CM

## 2024-07-09 DIAGNOSIS — I24.89 DEMAND ISCHEMIA (H): ICD-10-CM

## 2024-07-09 DIAGNOSIS — I10 BENIGN ESSENTIAL HYPERTENSION: ICD-10-CM

## 2024-07-09 DIAGNOSIS — R53.81 PHYSICAL DECONDITIONING: ICD-10-CM

## 2024-07-09 DIAGNOSIS — R53.1 GENERALIZED WEAKNESS: ICD-10-CM

## 2024-07-09 DIAGNOSIS — M79.89 SWELLING OF LIMB: ICD-10-CM

## 2024-07-09 DIAGNOSIS — E66.01 CLASS 2 SEVERE OBESITY DUE TO EXCESS CALORIES WITH SERIOUS COMORBIDITY AND BODY MASS INDEX (BMI) OF 39.0 TO 39.9 IN ADULT (H): ICD-10-CM

## 2024-07-09 DIAGNOSIS — E87.6 HYPOKALEMIA: ICD-10-CM

## 2024-07-09 DIAGNOSIS — G62.9 NEUROPATHY: ICD-10-CM

## 2024-07-09 DIAGNOSIS — I21.4 NSTEMI (NON-ST ELEVATED MYOCARDIAL INFARCTION) (H): ICD-10-CM

## 2024-07-09 DIAGNOSIS — E66.812 CLASS 2 SEVERE OBESITY DUE TO EXCESS CALORIES WITH SERIOUS COMORBIDITY AND BODY MASS INDEX (BMI) OF 39.0 TO 39.9 IN ADULT (H): ICD-10-CM

## 2024-07-09 DIAGNOSIS — R41.0 CONFUSION: ICD-10-CM

## 2024-07-09 DIAGNOSIS — G40.909 SEIZURE DISORDER (H): ICD-10-CM

## 2024-07-09 DIAGNOSIS — R73.03 PREDIABETES: ICD-10-CM

## 2024-07-09 PROCEDURE — 99309 SBSQ NF CARE MODERATE MDM 30: CPT | Performed by: NURSE PRACTITIONER

## 2024-07-09 NOTE — PROGRESS NOTES
Cirilo Gardner  1960      Orders  Discontinue ECG and labs on 7/15  Please schedule BMP and ECG on 7/15.   Dx medication monitoring.     Electronically signed.    DEBRA Javed CNP on 7/9/2024 at 2:21 PM

## 2024-07-09 NOTE — LETTER
7/9/2024      Cirilo Gardner  [address] [facility] [address]        [facility] GERIATRICS    Chief Complaint   Patient presents with     RECHECK     HPI:  Cirilo Gardner is a 63 year old  (1960), who is being seen today for an episodic care visit at: [facility] ([facility]) [[id number]].    His past medical history includes  ischemic CVA of posterior left MCA due to LV thrombus  LV thrombus  Altered alertness improved with Keppra  Heart failure with reduced ejection fraction  NSTEMI due to demand ischemia  Prediabetes    6/12/2024, patient presented with confusion and expressive/receptive language deficits.  He was found to have a LV thrombus.  He was started on Lovenox and warfarin.  [facility]  He was found to have evidence of L vertebral occlusion at the origin of the CTA.  On 6/17 he started having new symptoms and code stroke was called. MRI with evidnec of new L anterior borderzone infarct. He continued to have fluctuating neuro exam on 6/18 that was out of proportion to new infarct burden. Neuro unclear if symptoms were d/t new ischemia or localization seizures. He was started on keppra with improvement of symptoms even though no clear seizures captured on EEG. Neuro recommends that he continue on keppra at time of discharge until clinic follow-up.   His echo revealed an EF of 15-20% with LV apical thrombus.  He was started on medical management.  He was unable to complete a chest CTA due to his inability to hold his arms above his head for the needed time.  His CT coronary calcium score was 23 which was low and no need for inpatient coronary angiogram.     6/24/2024, patient was treated for cellulitis on right hand with Keflex there was no improvement    6/25/2024, patient Keflex was discontinued was started on prednisone    On 6/28/2024, patient saw cardiology with no new orders    On 7/1/2024 increased Entresto to 49/51      Today weight  Problem: Patient Care Overview  Goal: Plan of Care Review  Outcome: Outcome(s) achieved Date Met:  02/21/17  Patient is alert and oriented, vital signs are stable, with patient provided instruction in safety awareness and call light usage. No falls with precautions in effect. No s/s of pressure ulcers, with Abd incision intact. Patient independent with mobility. Pain managed with PRN medications. Fluids encouraged. Will continue to monitor patient. Abd binder in place.           "is down, blood pressures are controlled/high  Per nursing,  Pt requires min Ax1 with bathing and lower body dressing, CGA with upper body dressing, grooming, oral care, toileting ambulation, bed mobility and transfer.  Continent of both B & B.   Lymphedema dressing on M-W-F.  Per therapy, : Safety questionare completed with pt. 7/9/24 to assess safety when returning to OF. Client scored 14/22   indicating mod-severe safety risk. Some answers were difficult for client to answer due to aphasia. Therapist educated   client on the purpose for questionare being complete within session.        Allergies, and PMH/PSH reviewed in [employer] today.  REVIEW OF SYSTEMS:  4 point ROS including Respiratory, CV, GI and , other than that noted in the HPI,  is negative    Objective:   BP (!) 149/76   Pulse 66   Temp 97.7  F (36.5  C)   Resp 17   Ht 1.702 m (5' 7\")   Wt 104.3 kg (230 lb)   SpO2 92%   BMI 36.02 kg/m    GENERAL APPEARANCE:  Alert, in no distress, aphasic  RESP:  lungs clear to auscultation , no respiratory distress  CV:  peripheral edema 1+ in BLE, rate-normal  PSYCH:  affect and mood normal    Most Recent 3 CBC's:  Recent Labs   Lab Test 07/01/24  0804 06/26/24  0606 06/17/24  1808   WBC 8.5 8.8 8.7   HGB 17.2 15.9 17.0   MCV 95 97 94   * 391 362     Most Recent 3 BMP's:  Recent Labs   Lab Test 06/27/24  0648 06/26/24  0606 06/20/24  1407 06/20/24  1150 06/20/24  0810    140  --   --  143   POTASSIUM 4.1 3.5 3.6  --  3.2*   CHLORIDE 105 104  --   --  103   CO2 21* 24  --   --  26   BUN 22.3 17.2  --   --  14.6   CR 0.88 0.95  --   --  0.88   ANIONGAP 14 12  --   --  14   PARISH 10.1 9.6  --   --  9.1   * 91  --  144* 103*     Most Recent 3 BNP's:  Recent Labs   Lab Test 06/27/24  0648 06/16/24  1413 06/12/24  1408   NTBNPI  --  3,229* 12,210*   NTBN 842  --   --        Assessment/Plan:  (I63.9) Ischemic cerebrovascular accident (CVA) (H)  (primary encounter diagnosis)  Comment: left hemispheric " infarcts, presumed embolic due to LV thrombus  L vertebral occlusion at origin on CTA.   Started celexa in TCU  Plan:   ECG this week as pt may discharge next week  [facility] Date & Time of Appointment: 6/26/ @3 :00 PM Phone Number: [phone] Address: [address]  Follow up with neurology on 8/13  Follow up with sleep medicine on 10/30     (I24.0) LV (left ventricular) mural thrombus without MI (H)  Comment: Echo revealed LV thrombus   Taking apixaban  Saw cardiology on 6/28  CBC WNL  (07/01/24)  Plan: Continue with plan of care no changes at this time, adjustment as needed      (R41.0) Confusion  Comment: was having some confusion. Started on Keppra  Plan: follow up with neurology      (I50.20) Heart failure with reduced ejection fraction, NYHA class III (H)  (I24.89) Demand ischemia (H)  (I21.4) NSTEMI (non-ST elevated myocardial infarction) (H)  Comment: EF 30-35%  Pt is decreasing weight and diuresing with lasix 40 mg daily   ACE/ARB/ARNI: Entresto 24/26 - had some issues with borderline hypotension middle dose Entresto during his hospitalization - Entresto increased on 7/1  SGLT2 - Jardiance  BB: coreg increased to 25 mg twice daily   Aldosterone Antagonist: spironolactone  SCD: not qualified at this time  Plan:   Daily weights  Follow up with cardiology in 2 months with a repeat echocardiogram beforehand   BMP this week as pt may discharge next week     (R73.03) Prediabetes  Comment: chronic  Hgb A1c = 6.4 (6/12)   Blood sugars WNL  Plan: continue sugar alternating times     (E66.01,  Z68.39) Class 2 severe obesity due to excess calories with serious comorbidity and body mass index (BMI) of 39.0 to 39.9 in adult (H)  Comment: chronic  Losing weight since at facility.  Current weight is 230 (07/08/24) was 253 (6/20)   Plan: Continue with plan of care no changes at this time, adjustment as needed             (I10) Benign essential hypertension  Comment: chronic  On coreg, furosemide,  spironolactone, entresto  BMP (6/27/24) WNL  Plan: Continue with plan of care no changes at this time, adjustment as needed         (R53.81) Physical deconditioning  (R53.1) Generalized weakness  Comment: asphasia   Right sided weakness  Working with therapy  Safety concerns per therapy   Plan: Continue with plan of care no changes at this time, adjustment as needed        (M79.89) Swelling of limb   Comment: right hand red and swollen -- resolved  Plan:Continue with plan of care no changes at this time, adjustment as needed           (K59.01) Slow transit constipation  Comment: chronic  Plan: continue with Bowel medications    LE edema   Measurements per therapy on 07/08/24  RLE:  MTP: 24.4cm  +5cm: 26.2cm  malleoli: 26.5cm  +10cm: 25.1cm  +20cm: 36.9cm  +30cm: 37.3cm  +40cm: 42.4cm  total girth: 218.8cm  LLE:   MTP: 23.4cm  +5cm: 25.2cm  malleoli: 27.1cm  +10cm: 26.1cm  +20cm: 35.8cm  +30cm: 36.3cm  +40cm: 43.6cm  total girth: 217.2c  Plan:   Continue with lymphedema      MED REC REQUIRED  Post Medication Reconciliation Status:  Medication reconciliation previously completed during another office visit      Electronically signed by:      DEBRA Javed CNP on 7/9/2024 at 2:20 PM            Cirilo Gardner  1960      Orders  Discontinue ECG and labs on 7/15  Please schedule BMP and ECG on 7/15.   Dx medication monitoring.     Electronically signed.    DEBRA Javed CNP on 7/9/2024 at 2:21 PM        Sincerely,        DEBRA Javed CNP

## 2024-07-09 NOTE — PROGRESS NOTES
[facility] GERIATRICS    Chief Complaint   Patient presents with    RECHECK     HPI:  Cirilo Gardner is a 63 year old  (1960), who is being seen today for an episodic care visit at: [facility]) [[id number]].    His past medical history includes  ischemic CVA of posterior left MCA due to LV thrombus  LV thrombus  Altered alertness improved with Keppra  Heart failure with reduced ejection fraction  NSTEMI due to demand ischemia  Prediabetes    6/12/2024, patient presented with confusion and expressive/receptive language deficits.  He was found to have a LV thrombus.  He was started on Lovenox and warfarin.  He was transition to Eliquis upon discharge.  He was found to have evidence of L vertebral occlusion at the origin of the CTA.  On 6/17 he started having new symptoms and code stroke was called. MRI with evidnec of new L anterior borderzone infarct. He continued to have fluctuating neuro exam on 6/18 that was out of proportion to new infarct burden. Neuro unclear if symptoms were d/t new ischemia or localization seizures. He was started on keppra with improvement of symptoms even though no clear seizures captured on EEG. Neuro recommends that he continue on keppra at time of discharge until clinic follow-up.   His echo revealed an EF of 15-20% with LV apical thrombus.  He was started on medical management.  He was unable to complete a chest CTA due to his inability to hold his arms above his head for the needed time.  His CT coronary calcium score was 23 which was low and no need for inpatient coronary angiogram.     6/24/2024, patient was treated for cellulitis on right hand with Keflex there was no improvement    6/25/2024, patient Keflex was discontinued was started on prednisone    On 6/28/2024, patient saw cardiology with no new orders    On 7/1/2024 increased Entresto to 49/51      Today weight is down, blood pressures are controlled/high  Per nursing,  Pt requires min Ax1  "with bathing and lower body dressing, CGA with upper body dressing, grooming, oral care, toileting ambulation, bed mobility and transfer.  Continent of both B & B.   Lymphedema dressing on M-W-F.  Per therapy, : Safety questionare completed with pt. 7/9/24 to assess safety when returning to OF. Client scored 14/22   indicating mod-severe safety risk. Some answers were difficult for client to answer due to aphasia. Therapist educated   client on the purpose for questionare being complete within session.        Allergies, and PMH/PSH reviewed in EPIC today.  REVIEW OF SYSTEMS:  4 point ROS including Respiratory, CV, GI and , other than that noted in the HPI,  is negative    Objective:   BP (!) 149/76   Pulse 66   Temp 97.7  F (36.5  C)   Resp 17   Ht 1.702 m (5' 7\")   Wt 104.3 kg (230 lb)   SpO2 92%   BMI 36.02 kg/m    GENERAL APPEARANCE:  Alert, in no distress, aphasic  RESP:  lungs clear to auscultation , no respiratory distress  CV:  peripheral edema 1+ in BLE, rate-normal  PSYCH:  affect and mood normal    Most Recent 3 CBC's:  Recent Labs   Lab Test 07/01/24  0804 06/26/24  0606 06/17/24  1808   WBC 8.5 8.8 8.7   HGB 17.2 15.9 17.0   MCV 95 97 94   * 391 362     Most Recent 3 BMP's:  Recent Labs   Lab Test 06/27/24  0648 06/26/24  0606 06/20/24  1407 06/20/24  1150 06/20/24  0810    140  --   --  143   POTASSIUM 4.1 3.5 3.6  --  3.2*   CHLORIDE 105 104  --   --  103   CO2 21* 24  --   --  26   BUN 22.3 17.2  --   --  14.6   CR 0.88 0.95  --   --  0.88   ANIONGAP 14 12  --   --  14   PARISH 10.1 9.6  --   --  9.1   * 91  --  144* 103*     Most Recent 3 BNP's:  Recent Labs   Lab Test 06/27/24  0648 06/16/24  1413 06/12/24  1408   NTBNPI  --  3,229* 12,210*   NTBNP 842  --   --        Assessment/Plan:  (I63.9) Ischemic cerebrovascular accident (CVA) (H)  (primary encounter diagnosis)  Comment: left hemispheric infarcts, presumed embolic due to LV thrombus  L vertebral occlusion at origin " on CTA.   Started celexa in TCU  Plan:   ECG this week as pt may discharge next week  [facility] Date & Time of Appointment: 6/26/ @3 :00 PM Phone Number: [phone] Address: [address]  Follow up with neurology on 8/13  Follow up with sleep medicine on 10/30     (I24.0) LV (left ventricular) mural thrombus without MI (H)  Comment: Echo revealed LV thrombus   Taking apixaban  Saw cardiology on 6/28  CBC WNL  (07/01/24)  Plan: Continue with plan of care no changes at this time, adjustment as needed      (R41.0) Confusion  Comment: was having some confusion. Started on Keppra  Plan: follow up with neurology      (I50.20) Heart failure with reduced ejection fraction, NYHA class III (H)  (I24.89) Demand ischemia (H)  (I21.4) NSTEMI (non-ST elevated myocardial infarction) (H)  Comment: EF 30-35%  Pt is decreasing weight and diuresing with lasix 40 mg daily   ACE/ARB/ARNI: Entresto 24/26 - had some issues with borderline hypotension middle dose Entresto during his hospitalization - Entresto increased on 7/1  SGLT2 - Jardiance  BB: coreg increased to 25 mg twice daily   Aldosterone Antagonist: spironolactone  SCD: not qualified at this time  Plan:   Daily weights  Follow up with cardiology in 2 months with a repeat echocardiogram beforehand   BMP this week as pt may discharge next week     (R73.03) Prediabetes  Comment: chronic  Hgb A1c = 6.4 (6/12)   Blood sugars WNL  Plan: continue sugar alternating times     (E66.01,  Z68.39) Class 2 severe obesity due to excess calories with serious comorbidity and body mass index (BMI) of 39.0 to 39.9 in adult (H)  Comment: chronic  Losing weight since at facility.  Current weight is 230 (07/08/24) was 253 (6/20)   Plan: Continue with plan of care no changes at this time, adjustment as needed             (I10) Benign essential hypertension  Comment: chronic  On coreg, furosemide, spironolactone, entresto  BMP (6/27/24) WNL  Plan: Continue with plan of care no  changes at this time, adjustment as needed         (R53.81) Physical deconditioning  (R53.1) Generalized weakness  Comment: asphasia   Right sided weakness  Working with therapy  Safety concerns per therapy   Plan: Continue with plan of care no changes at this time, adjustment as needed        (M79.89) Swelling of limb   Comment: right hand red and swollen -- resolved  Plan:Continue with plan of care no changes at this time, adjustment as needed           (K59.01) Slow transit constipation  Comment: chronic  Plan: continue with Bowel medications    LE edema   Measurements per therapy on 07/08/24  RLE:  MTP: 24.4cm  +5cm: 26.2cm  malleoli: 26.5cm  +10cm: 25.1cm  +20cm: 36.9cm  +30cm: 37.3cm  +40cm: 42.4cm  total girth: 218.8cm  LLE:   MTP: 23.4cm  +5cm: 25.2cm  malleoli: 27.1cm  +10cm: 26.1cm  +20cm: 35.8cm  +30cm: 36.3cm  +40cm: 43.6cm  total girth: 217.2c  Plan:   Continue with lymphedema      MED REC REQUIRED  Post Medication Reconciliation Status:  Medication reconciliation previously completed during another office visit      Electronically signed by:      DEBRA Javed CNP on 7/9/2024 at 2:20 PM

## 2024-07-10 ENCOUNTER — LAB REQUISITION (OUTPATIENT)
Dept: LAB | Facility: CLINIC | Age: 64
End: 2024-07-10
Payer: COMMERCIAL

## 2024-07-10 DIAGNOSIS — I50.9 HEART FAILURE, UNSPECIFIED (H): ICD-10-CM

## 2024-07-11 ENCOUNTER — TELEPHONE (OUTPATIENT)
Dept: GERIATRICS | Facility: CLINIC | Age: 64
End: 2024-07-11

## 2024-07-11 ENCOUNTER — TRANSFERRED RECORDS (OUTPATIENT)
Dept: HEALTH INFORMATION MANAGEMENT | Facility: CLINIC | Age: 64
End: 2024-07-11

## 2024-07-11 LAB
ANION GAP SERPL CALCULATED.3IONS-SCNC: 12 MMOL/L (ref 7–15)
BUN SERPL-MCNC: 17 MG/DL (ref 8–23)
CALCIUM SERPL-MCNC: 10.1 MG/DL (ref 8.8–10.2)
CHLORIDE SERPL-SCNC: 105 MMOL/L (ref 98–107)
CREAT SERPL-MCNC: 0.85 MG/DL (ref 0.67–1.17)
DEPRECATED HCO3 PLAS-SCNC: 25 MMOL/L (ref 22–29)
EGFRCR SERPLBLD CKD-EPI 2021: >90 ML/MIN/1.73M2
GLUCOSE SERPL-MCNC: 94 MG/DL (ref 70–99)
POTASSIUM SERPL-SCNC: 3.8 MMOL/L (ref 3.4–5.3)
SODIUM SERPL-SCNC: 142 MMOL/L (ref 135–145)

## 2024-07-11 PROCEDURE — 80048 BASIC METABOLIC PNL TOTAL CA: CPT | Mod: ORL | Performed by: NURSE PRACTITIONER

## 2024-07-11 PROCEDURE — 36415 COLL VENOUS BLD VENIPUNCTURE: CPT | Mod: ORL | Performed by: NURSE PRACTITIONER

## 2024-07-11 PROCEDURE — P9604 ONE-WAY ALLOW PRORATED TRIP: HCPCS | Mod: ORL | Performed by: NURSE PRACTITIONER

## 2024-07-11 NOTE — TELEPHONE ENCOUNTER
[facility] Lab Note     Provider: DEBRA Cardenas CNP  Facility: [facility] Facility Type:  TCU    No Known Allergies    Labs Reviewed by provider:   Faxed lab       Verbal Order/Direction given by Provider: SHAHANA    Provider giving Order:  DEBRA Cardenas CNP, RN

## 2024-07-15 ENCOUNTER — DISCHARGE SUMMARY NURSING HOME (OUTPATIENT)
Dept: GERIATRICS | Facility: CLINIC | Age: 64
End: 2024-07-15
Payer: COMMERCIAL

## 2024-07-15 VITALS
HEART RATE: 60 BPM | OXYGEN SATURATION: 92 % | BODY MASS INDEX: 35.72 KG/M2 | RESPIRATION RATE: 16 BRPM | DIASTOLIC BLOOD PRESSURE: 48 MMHG | SYSTOLIC BLOOD PRESSURE: 117 MMHG | WEIGHT: 227.6 LBS | TEMPERATURE: 97.1 F | HEIGHT: 67 IN

## 2024-07-15 DIAGNOSIS — I24.89 DEMAND ISCHEMIA (H): ICD-10-CM

## 2024-07-15 DIAGNOSIS — R53.1 GENERALIZED WEAKNESS: ICD-10-CM

## 2024-07-15 DIAGNOSIS — I63.9 ISCHEMIC CEREBROVASCULAR ACCIDENT (CVA) (H): ICD-10-CM

## 2024-07-15 DIAGNOSIS — K59.01 SLOW TRANSIT CONSTIPATION: ICD-10-CM

## 2024-07-15 DIAGNOSIS — G40.909 SEIZURE DISORDER (H): ICD-10-CM

## 2024-07-15 DIAGNOSIS — L03.113 CELLULITIS OF RIGHT UPPER EXTREMITY: ICD-10-CM

## 2024-07-15 DIAGNOSIS — I21.4 NSTEMI (NON-ST ELEVATED MYOCARDIAL INFARCTION) (H): ICD-10-CM

## 2024-07-15 DIAGNOSIS — I42.9 CARDIOMYOPATHY, UNSPECIFIED TYPE (H): ICD-10-CM

## 2024-07-15 DIAGNOSIS — E66.01 CLASS 2 SEVERE OBESITY DUE TO EXCESS CALORIES WITH SERIOUS COMORBIDITY AND BODY MASS INDEX (BMI) OF 39.0 TO 39.9 IN ADULT (H): ICD-10-CM

## 2024-07-15 DIAGNOSIS — E66.812 CLASS 2 SEVERE OBESITY DUE TO EXCESS CALORIES WITH SERIOUS COMORBIDITY AND BODY MASS INDEX (BMI) OF 39.0 TO 39.9 IN ADULT (H): ICD-10-CM

## 2024-07-15 DIAGNOSIS — K59.00 CONSTIPATION, UNSPECIFIED CONSTIPATION TYPE: ICD-10-CM

## 2024-07-15 DIAGNOSIS — I24.0 LV (LEFT VENTRICULAR) MURAL THROMBUS WITHOUT MI (H): ICD-10-CM

## 2024-07-15 DIAGNOSIS — R53.81 PHYSICAL DECONDITIONING: ICD-10-CM

## 2024-07-15 DIAGNOSIS — G62.9 NEUROPATHY: ICD-10-CM

## 2024-07-15 DIAGNOSIS — I69.320 APHASIA AS LATE EFFECT OF CEREBROVASCULAR ACCIDENT (CVA): Primary | ICD-10-CM

## 2024-07-15 DIAGNOSIS — R41.0 CONFUSION: ICD-10-CM

## 2024-07-15 DIAGNOSIS — I10 BENIGN ESSENTIAL HYPERTENSION: ICD-10-CM

## 2024-07-15 DIAGNOSIS — M79.89 SWELLING OF LIMB: ICD-10-CM

## 2024-07-15 DIAGNOSIS — R73.03 PREDIABETES: ICD-10-CM

## 2024-07-15 DIAGNOSIS — I50.20 HEART FAILURE WITH REDUCED EJECTION FRACTION, NYHA CLASS III (H): ICD-10-CM

## 2024-07-15 PROCEDURE — 99316 NF DSCHRG MGMT 30 MIN+: CPT | Performed by: NURSE PRACTITIONER

## 2024-07-15 NOTE — PROGRESS NOTES
[facility] GERIATRICS DISCHARGE SUMMARY  PATIENT'S NAME: Cirilo Gardner  YOB: 1960  MEDICAL RECORD NUMBER:  [id number]  Place of Service where encounter took place:  [facility] (TCU) [[id number]]    PRIMARY CARE PROVIDER AND CLINIC RESPONSIBLE AFTER TRANSFER:   Physician No Ref-Primary, No address on file    FMG Provider     Transferring providers: Teressa RICHARDSON CNP; Luciana Denton MD  Recent Hospitalization/ED:  [facility] stay 6/12/24 to 6/20/24.  Date of SNF Admission: June 20, 2024  Date of SNF (anticipated) Discharge:  7/19/2024  Discharged to: new assisted living for patient.  Shoulders  Cognitive Scores:  8/30 CPT also has receptive and expressive aphasia  Physical Function:  Pt requires min Ax1 with bathing and lower body dressing, CGA with upper body dressing, grooming, oral care, toileting ambulation, bed mobility and transfer, pt is independent with feeding after set-up.  DME: No new DME needed    CODE STATUS/ADVANCE DIRECTIVES DISCUSSION:  Full Code   ALLERGIES: Patient has no known allergies.    His past medical history includes  ischemic CVA of posterior left MCA due to LV thrombus  LV thrombus  Altered alertness improved with Keppra  Heart failure with reduced ejection fraction  NSTEMI due to demand ischemia  Prediabetes        6/12/2024, patient presented with confusion and expressive/receptive language deficits.  He was found to have a LV thrombus.  He was started on Lovenox and warfarin.  He was transition to [facility] upon discharge.  He was found to have evidence of L vertebral occlusion at the origin of the CTA.  On 6/17 he started having new symptoms and code stroke was called. MRI with evidnec of new L anterior borderzone infarct. He continued to have fluctuating neuro exam on 6/18 that was out of proportion to new infarct burden. Neuro unclear if symptoms were d/t new ischemia or localization seizures. He was  started on keppra with improvement of symptoms even though no clear seizures captured on EEG. Neuro recommends that he continue on keppra at time of discharge until clinic follow-up.   His echo revealed an EF of 15-20% with LV apical thrombus.  He was started on medical management.  He was unable to complete a chest CTA due to his inability to hold his arms above his head for the needed time.  His CT coronary calcium score was 23 which was low and no need for inpatient coronary angiogram.        NURSING FACILITY COURSE   Medication Changes/Rationale:   Summary of nursing facility stay:       (I63.9) Ischemic cerebrovascular accident (CVA) (H)  (primary encounter diagnosis)  (I69.32) Aphasia as late effect of cerebrovascular accident (CVA)   Comment: left hemispheric infarcts, presumed embolic due to LV thrombus  L vertebral occlusion at origin on CTA.   [facility] Bridget Nelson Date & Time of Appointment: 8/7/24 @ 10:00 AM Phone Number: [phone] Address: [address] [place] [address] [address]  [facility] Date & Time of Appointment: 10/30/24 @ 12:45 PM Phone Number: [phone] Address: [address] [place], [place]     (I24.0) LV (left ventricular) mural thrombus without MI (H)  Comment: Echo revealed LV thrombus   Taking apixaban  Hemoglobin   Date Value Ref Range Status   07/01/2024 17.2 13.3 - 17.7 g/dL Final          (R41.0) Confusion  Comment: was having some confusion. Started on Keppra  Balance expressive and receptive aphasia there for confusion is difficult to assess     (I50.20) Heart failure with reduced ejection fraction, NYHA class III (H)  (I24.89) Demand ischemia (H)  (I21.4) NSTEMI (non-ST elevated myocardial infarction) (H)  Comment: EF 30-35%  Diuretic: Furosemide 40 mg daily  ACE/ARB/ARNI: Entresto increased during TCU stay  SGLT2 - Jardiance 10 mg daily  BB: coreg 25 mg twice daily this was increased during TCU stay due to hypertension  Aldosterone Antagonist:  spironolactone 25 mg daily  SCD: not qualified at this time  Patient saw cardiology in June 2024 with no new orders.    Patient has had a significant weight loss during TCU stay this includes significant amount of fluid  On 7/17/24 - pt was hypokalemia and added potassium daily - recommend repeating potassium the week of 7/22      (R73.03) Prediabetes  Comment: chronic  Hgb A1c = 6.4 (6/12)   Blood sugars WNL  Currently taking Jardiance     (E66.01,  Z68.39) Class 2 severe obesity due to excess calories with serious comorbidity and body mass index (BMI) of 39.0 to 39.9 in adult (H)  Comment: chronic  Weight at admission was 255 pounds (6/20/2024)  Weight at discharge 227 (7/15/2024)  Patient usually eats 50% or more of meals       (I10) Benign essential hypertension  Comment: chronic  On coreg, Entresto, Jardiance, furosemide, spironolactone       (R53.81) Physical deconditioning  (R53.1) Generalized weakness  Comment: asphasia   Right sided weakness  Improving however patient continues to have expressive and receptive aphasia        (L03.113) Cellulitis   Comment: Resolved.   right hand red and swollen  Was treated with cellulitis and then prednisone for 5 days for pain, swelling and redness     (K59.00) Constipation  Comment: Chronic   Senna BID     Discharge Medications:  MED REC REQUIRED  Post Medication Reconciliation Status:  Discharge medications reconciled and changed, see notes/orders       Current Outpatient Medications   Medication Sig Dispense Refill    acetaminophen (TYLENOL) 500 MG tablet Take 1,000 mg by mouth 3 times daily      apixaban ANTICOAGULANT (ELIQUIS) 5 MG tablet Take 1 tablet (5 mg) by mouth 2 times daily      atorvastatin (LIPITOR) 40 MG tablet Take 1 tablet (40 mg) by mouth every evening      carvedilol (COREG) 12.5 MG tablet Take 1 tablet (12.5 mg) by mouth 2 times daily (with meals) HOLD If sbp <110 or HR <60      citalopram (CELEXA) 10 MG tablet Take 1 tablet (10 mg) by mouth daily    "   empagliflozin (JARDIANCE) 10 MG TABS tablet Take 1 tablet (10 mg) by mouth daily      furosemide (LASIX) 40 MG tablet Take 1 tablet (40 mg) by mouth daily Hold if sbp <100      gabapentin (NEURONTIN) 100 MG capsule Take 1 capsule (100 mg) by mouth at bedtime      levETIRAcetam (KEPPRA) 750 MG tablet Take 2 tablets (1,500 mg) by mouth 2 times daily      sacubitril-valsartan (ENTRESTO) 49-51 MG per tablet Take 1 tablet by mouth 2 times daily      senna-docusate (SENOKOT-S/PERICOLACE) 8.6-50 MG tablet Take 1 tablet by mouth 2 times daily      spironolactone (ALDACTONE) 25 MG tablet Take 1 tablet (25 mg) by mouth daily HOLD if sbp <115            Controlled medications:   not applicable/none     Past Medical History: No past medical history on file.  Physical Exam:   Vitals: /48   Pulse 60   Temp 97.1  F (36.2  C)   Resp 16   Ht 1.702 m (5' 7\")   Wt 103.2 kg (227 lb 9.6 oz)   SpO2 92%   BMI 35.65 kg/m    BMI: Body mass index is 35.65 kg/m .  GENERAL APPEARANCE:  Alert, in no distress, expressive and receptive aphasia  RESP:  lungs clear to auscultation , no respiratory distress  CV:  rate-normal  PSYCH:  affect and mood normal     SNF labs: Most Recent 3 CBC's:  Recent Labs   Lab Test 07/01/24  0804 06/26/24  0606 06/17/24  1808   WBC 8.5 8.8 8.7   HGB 17.2 15.9 17.0   MCV 95 97 94   * 391 362     Most Recent 3 BMP's:  Recent Labs   Lab Test 07/11/24  0814 06/27/24  0648 06/26/24  0606    140 140   POTASSIUM 3.8 4.1 3.5   CHLORIDE 105 105 104   CO2 25 21* 24   BUN 17.0 22.3 17.2   CR 0.85 0.88 0.95   ANIONGAP 12 14 12   PARISH 10.1 10.1 9.6   GLC 94 125* 91       Most Recent Cholesterol Panel:  Recent Labs   Lab Test 06/13/24  0742   CHOL 135   LDL 82   HDL 41   TRIG 58       Most Recent Hemoglobin A1c:  Recent Labs   Lab Test 06/12/24  1408   A1C 6.4*       DISCHARGE PLAN:  Follow up labs: BMP level due 1-2 weeks to be drawn by home care with results to PCP  Medical Follow Up:      Follow up " with primary care provider in 1-2 weeks  Follow up with specialist neurology, cardiology, sleep medicine   [facility] scheduled appointments:  Appointments in Next Year      Jul 15, 2024 10:00 AM  Discharge Summary with DEBRA Cruz CNP  [facility] ([facility] ) [phone]     Aug 13, 2024 1:30 PM  (Arrive by 1:15 PM)  Hospital Follow-Up Stroke with Travis Martinez MD  [facility] [place] ([facility] ) [phone]     Aug 26, 2024 10:00 AM  (Arrive by 9:45 AM)  ECHO COMPLETE with [id number]  [facility] ([facility] ) [phone]     Aug 28, 2024 10:40 AM  (Arrive by 10:30 AM)  RETURN CORE with Colton Mejias NP  [facility] [place] ([facility] ) [phone]     Oct 30, 2024 1:00 PM  (Arrive by 12:45 PM)  New Sleep Patient with Bennett Ezra Goltz, PA-C  [facility] [place] ([facility] ) [phone]          Discharge Services: Home Care:  Occupational Therapy, Physical Therapy, Speech Therapy , Registered Nurse, and Home Health Aide  Discharge Instructions Verbalized to Patient at Discharge:   None    TOTAL DISCHARGE TIME:   Greater than 30 minutes  Electronically signed by:      DEBRA Javed CNP     Documentation of Face to Face and Certification for Home Health Services    I certify that patient: Cirilo Gardner is under my care and that I, or a nurse practitioner or physician's assistant working with me, had a face-to-face encounter that meets the physician face-to-face encounter requirements with this patient on: 7/15/2024.    This encounter with the patient was in whole, or in part, for the following medical condition, which is the primary reason for home health care:        LV (left  ventricular) mural thrombus without MI (H)  Heart failure with reduced ejection fraction, NYHA class III (H)  Demand ischemia (H)  Prediabetes  Cardiomyopathy, unspecified type (H)  Neuropathy  Swelling of limb  Seizure disorder (H)  Ischemic cerebrovascular accident (CVA) (H)  Confusion  NSTEMI (non-ST elevated myocardial infarction) (H)  Class 2 severe obesity due to excess calories with serious comorbidity and body mass index (BMI) of 39.0 to 39.9 in adult (H)  Benign essential hypertension  Physical deconditioning  Generalized weakness  Constipation, unspecified constipation type  Cellulitis of right upper extremity  Slow transit constipation  Aphasia as late effect of cerebrovascular accident (CVA)  .    I certify that, based on my findings, the following services are medically necessary home health services: Nursing, Occupational Therapy, Physical Therapy, and Speech Language Therapy.    My clinical findings support the need for the above services because: Nurse is needed: To provide assessment and oversight required in the home to assure adherence to the medical plan due to: expressive and receptive asphasia.., Occupational Therapy Services are needed to assess and treat cognitive ability and address ADL safety due to impairment in cognition, executive functioning, speech, ADLs., Physical Therapy Services are needed to assess and treat the following functional impairments: ADLs, executive functioning., and Speech Therapy Services are needed to assess and treat impairments in language and/or swallow functions due to cognition, executive functioning, speech, ADLs.    Further, I certify that my clinical findings support that this patient is homebound (i.e. absences from home require considerable and taxing effort and are for medical reasons or [employer] services or infrequently or of short duration when for other reasons) because: Mental health symptoms including: Mental health condition is exacerbated by  exposure to crowds, unfamiliar environment or new stressful situations...    Based on the above findings. I certify that this patient is confined to the home and needs intermittent skilled nursing care, physical therapy and/or speech therapy.  The patient is under my care, and I have initiated the establishment of the plan of care.  This patient will be followed by a physician who will periodically review the plan of care.  Physician/Provider to provide follow up care: No Ref-Primary, Physician    Attending hospital physician (the Medicare certified [facility] provider): DEBRA Javed CNP  Physician Signature: See electronic signature associated with these discharge orders.  Date: 7/14/2024

## 2024-07-16 ENCOUNTER — LAB REQUISITION (OUTPATIENT)
Dept: LAB | Facility: CLINIC | Age: 64
End: 2024-07-16
Payer: COMMERCIAL

## 2024-07-16 DIAGNOSIS — I50.9 HEART FAILURE, UNSPECIFIED (H): ICD-10-CM

## 2024-07-16 RX ORDER — CARVEDILOL 12.5 MG/1
12.5 TABLET ORAL 2 TIMES DAILY WITH MEALS
Status: SHIPPED
Start: 2024-07-16

## 2024-07-16 NOTE — PROGRESS NOTES
Cirilo Gardner  1960    Orders  Stop current coreg dose  Start carvedilol 12.5 mg twice daily by mouth.  Hold for SBP less than 110 mmHg and/or HR less than 60 bpm.   BMP, cholesterol level, and CBC and Levetiracetam next lab day.   Dx CVA    Electronically signed    DEBRA Javed CNP on 7/16/2024 at 5:58 AM

## 2024-07-17 LAB
ANION GAP SERPL CALCULATED.3IONS-SCNC: 14 MMOL/L (ref 7–15)
BASOPHILS # BLD AUTO: NORMAL 10*3/UL
BASOPHILS # BLD MANUAL: 0.2 10E3/UL (ref 0–0.2)
BASOPHILS NFR BLD AUTO: NORMAL %
BASOPHILS NFR BLD MANUAL: 4 %
BUN SERPL-MCNC: 16 MG/DL (ref 8–23)
BURR CELLS BLD QL SMEAR: SLIGHT
CALCIUM SERPL-MCNC: 10.7 MG/DL (ref 8.8–10.4)
CHLORIDE SERPL-SCNC: 104 MMOL/L (ref 98–107)
CHOLEST SERPL-MCNC: 109 MG/DL
CREAT SERPL-MCNC: 0.82 MG/DL (ref 0.67–1.17)
EGFRCR SERPLBLD CKD-EPI 2021: >90 ML/MIN/1.73M2
EOSINOPHIL # BLD AUTO: NORMAL 10*3/UL
EOSINOPHIL # BLD MANUAL: 0.2 10E3/UL (ref 0–0.7)
EOSINOPHIL NFR BLD AUTO: NORMAL %
EOSINOPHIL NFR BLD MANUAL: 5 %
ERYTHROCYTE [DISTWIDTH] IN BLOOD BY AUTOMATED COUNT: 12.3 % (ref 10–15)
GLUCOSE SERPL-MCNC: 100 MG/DL (ref 70–99)
HCO3 SERPL-SCNC: 26 MMOL/L (ref 22–29)
HCT VFR BLD AUTO: 50.8 % (ref 40–53)
HGB BLD-MCNC: 16.2 G/DL (ref 13.3–17.7)
IMM GRANULOCYTES # BLD: NORMAL 10*3/UL
IMM GRANULOCYTES NFR BLD: NORMAL %
LEVETIRACETAM SERPL-MCNC: 30.8 ΜG/ML (ref 10–40)
LYMPHOCYTES # BLD AUTO: NORMAL 10*3/UL
LYMPHOCYTES # BLD MANUAL: 1.3 10E3/UL (ref 0.8–5.3)
LYMPHOCYTES NFR BLD AUTO: NORMAL %
LYMPHOCYTES NFR BLD MANUAL: 31 %
MCH RBC QN AUTO: 29.8 PG (ref 26.5–33)
MCHC RBC AUTO-ENTMCNC: 31.9 G/DL (ref 31.5–36.5)
MCV RBC AUTO: 94 FL (ref 78–100)
MONOCYTES # BLD AUTO: NORMAL 10*3/UL
MONOCYTES # BLD MANUAL: 0.9 10E3/UL (ref 0–1.3)
MONOCYTES NFR BLD AUTO: NORMAL %
MONOCYTES NFR BLD MANUAL: 22 %
NEUTROPHILS # BLD AUTO: NORMAL 10*3/UL
NEUTROPHILS # BLD MANUAL: 1.6 10E3/UL (ref 1.6–8.3)
NEUTROPHILS NFR BLD AUTO: NORMAL %
NEUTROPHILS NFR BLD MANUAL: 38 %
NRBC # BLD AUTO: 0 10E3/UL
NRBC BLD AUTO-RTO: 0 /100
PLAT MORPH BLD: ABNORMAL
PLATELET # BLD AUTO: 360 10E3/UL (ref 150–450)
POTASSIUM SERPL-SCNC: 3.1 MMOL/L (ref 3.4–5.3)
RBC # BLD AUTO: 5.43 10E6/UL (ref 4.4–5.9)
RBC MORPH BLD: ABNORMAL
SODIUM SERPL-SCNC: 144 MMOL/L (ref 135–145)
WBC # BLD AUTO: 4.2 10E3/UL (ref 4–11)

## 2024-07-17 PROCEDURE — 80048 BASIC METABOLIC PNL TOTAL CA: CPT | Mod: ORL | Performed by: NURSE PRACTITIONER

## 2024-07-17 PROCEDURE — 36415 COLL VENOUS BLD VENIPUNCTURE: CPT | Mod: ORL | Performed by: NURSE PRACTITIONER

## 2024-07-17 PROCEDURE — P9604 ONE-WAY ALLOW PRORATED TRIP: HCPCS | Mod: ORL | Performed by: NURSE PRACTITIONER

## 2024-07-17 PROCEDURE — 82465 ASSAY BLD/SERUM CHOLESTEROL: CPT | Mod: ORL | Performed by: NURSE PRACTITIONER

## 2024-07-17 PROCEDURE — 80177 DRUG SCRN QUAN LEVETIRACETAM: CPT | Mod: ORL | Performed by: NURSE PRACTITIONER

## 2024-07-17 PROCEDURE — 85007 BL SMEAR W/DIFF WBC COUNT: CPT | Mod: ORL | Performed by: NURSE PRACTITIONER

## 2024-07-17 PROCEDURE — 85027 COMPLETE CBC AUTOMATED: CPT | Mod: ORL | Performed by: NURSE PRACTITIONER

## 2024-07-28 ENCOUNTER — HEALTH MAINTENANCE LETTER (OUTPATIENT)
Age: 64
End: 2024-07-28

## 2024-08-10 PROBLEM — I65.02 OCCLUSION AND STENOSIS OF LEFT VERTEBRAL ARTERY: Status: ACTIVE | Noted: 2024-08-10

## 2024-08-10 PROBLEM — I51.9 SEVERE LEFT VENTRICULAR SYSTOLIC DYSFUNCTION (LVSD): Status: ACTIVE | Noted: 2024-08-10

## 2024-08-10 PROBLEM — I51.89 SEVERE LEFT VENTRICULAR SYSTOLIC DYSFUNCTION (LVSD): Status: ACTIVE | Noted: 2024-08-10

## 2024-08-10 PROBLEM — I51.3 LV (LEFT VENTRICULAR) MURAL THROMBUS: Status: ACTIVE | Noted: 2024-08-10

## 2024-08-10 PROBLEM — E87.6 HYPOKALEMIA: Status: RESOLVED | Noted: 2024-06-21 | Resolved: 2024-08-10

## 2024-08-10 PROBLEM — R47.01 EXPRESSIVE APHASIA SYNDROME: Status: ACTIVE | Noted: 2024-08-10

## 2024-08-10 PROBLEM — R41.0 CONFUSION: Status: RESOLVED | Noted: 2024-06-12 | Resolved: 2024-08-10

## 2024-08-10 PROBLEM — I63.89: Status: ACTIVE | Noted: 2024-08-10

## 2024-08-10 PROBLEM — Z86.73 HISTORY OF STROKE: Status: ACTIVE | Noted: 2024-06-12

## 2024-08-23 PROCEDURE — 93248 EXT ECG>7D<15D REV&INTERPJ: CPT | Performed by: INTERNAL MEDICINE

## 2024-08-29 NOTE — PROGRESS NOTES
__________________________________________________________      [facility] [place]   [phone]  __________________________________________________________         History of Present Illness   Chief Complaint: Patient presents with:  RECHECK: Swollen hands that are improving slowly some word finding difficulty perhaps.      Cirilo Gardner is a 64 year old right haned male presenting for follow-up for left hemispheric strokes.     He was hospitalized at [facility] on 6/12-6/20/24. Prior to the hospital stay, he had no known significant PMHx; he hadn't received medical care in a number of years. He was not on any medications prior to his hospitalization.     He presented to the hospital with confusion, aphasia, right visual field cut. He was outside the window for TNK. He was found to have left hemispheric strokes. TTE revealed low EF and an LV thrombus. He was started on warfarin with a lovenox bridge then transitioned to apixaban.   While hospitalized, stroke code was called on 6/17 for new right sided weakness and worsening aphasia. He was found to have an additional left borderzone infarct. This was thought to be due to embolization of LV thrombus. LV thrombus was no longer visible on follow up TTE. Symptoms prompting stroke code were out of proportion to MRI findings so he was also worked up for seizure. EEG was unrevealing but his exam did improve after receiving a keppra load.     He was discharged from [facility] to an [facility] on 7/19/24. He is still getting SLP three times weekly and PT twice weekly. He reports that he has improved quite a bit since discharge but is still having difficulty with his speech. On exam, he also seems to still have a subtle right sided field cut.     Modified [place] Scale  Score: 3-Moderate disability; requiring some help, but able to walk without assistance    Stroke Evaluation Summarized:  New results resulted and reviewed by me  today are in BOLD below.  I personally reviewed the following neuroimaging studies today and the comments above reflect my own personal interpretation of the images: images: CT head, CTA head/neck, MRI brain, and I also showed MRI to the patient myself today.    MRI/Head CT MRI 6/17: new small left anterior border zone infarct along posterior margin of prior infarct, similar appearing subacute infarcts w/ petechial hemorrhage in left posterior border zone    MRI 6/14: left temporal/occipital infarct with petechial hemorrhage, left fontal infarct   Intracranial Vasculature Decreased arborization of distal left M3 branches    Cervical Vasculature Occluded left vertebral artery     CUS: < 50% bilateral ICA stenosis, left vertebral artery occlusion     Echocardiogram 6/19: EF 30-35%, moderately dilated bilateral atrium, no atrial shunt     6/12: EF 15-20%, borderline dilated LV, mild-moderate concentric LVH, severe LV hypokinesia, apical LV thrombus, moderate biatrial enlargement, moderate pulmonary hypertension,    EKG/Telemetry Sinus tachycardia with occasional Premature ventricular complexes   Possible Left atrial enlargement   Nonspecific T wave abnormality    Other Testing Ziopatch: 14-day cardiac monitor.  Single 5-beat VT run.  3 SVT runs, up to 7 beats.  No atrial fibrillation.    EEG: This is an abnormal EEG due to the presence of focal slowing over the left hemisphere. Findings are consistent with the history of left MCA stroke. No seizures.   No symptoms reported.     Labs Lab Results   Component Value Date    LDL 82 06/13/2024    A1C 6.4 (H) 06/12/2024    CTROPT 72 (H) 06/18/2024    INR 1.41 (H) 06/19/2024    INR 1.28 (H) 06/18/2024               Home Medications     Current Outpatient Medications   Medication Sig Dispense Refill    apixaban ANTICOAGULANT (ELIQUIS) 5 MG tablet Take 1 tablet (5 mg) by mouth 2 times daily      atorvastatin (LIPITOR) 40 MG tablet Take 1 tablet (40 mg) by mouth every evening    "   carvedilol (COREG) 12.5 MG tablet Take 1 tablet (12.5 mg) by mouth 2 times daily (with meals) HOLD If sbp <110 or HR <60      citalopram (CELEXA) 10 MG tablet Take 1 tablet (10 mg) by mouth daily      empagliflozin (JARDIANCE) 10 MG TABS tablet Take 1 tablet (10 mg) by mouth daily      furosemide (LASIX) 40 MG tablet Take 1 tablet (40 mg) by mouth daily Hold if sbp <100      gabapentin (NEURONTIN) 100 MG capsule Take 1 capsule (100 mg) by mouth at bedtime      levETIRAcetam (KEPPRA) 750 MG tablet Take 2 tablets (1,500 mg) by mouth 2 times daily      sacubitril-valsartan (ENTRESTO) 49-51 MG per tablet Take 1 tablet by mouth 2 times daily      senna-docusate (SENOKOT-S/PERICOLACE) 8.6-50 MG tablet Take 1 tablet by mouth 2 times daily      spironolactone (ALDACTONE) 25 MG tablet Take 1 tablet (25 mg) by mouth daily HOLD if sbp <115      acetaminophen (TYLENOL) 500 MG tablet Take 1,000 mg by mouth 3 times daily (Patient not taking: Reported on 8/30/2024)       No current facility-administered medications for this visit.            Physical Examination     Physical Exam    Estimated body mass index is 35.65 kg/m  as calculated from the following:    Height as of 7/15/24: 1.702 m (5' 7\").    Weight as of 7/15/24: 103.2 kg (227 lb 9.6 oz).    BP (!) 171/89   Pulse 79   SpO2 94%        General Exam  General: Sitting up in chair in no acute distress  Cardio:  RRR  Pulmonary:  no respiratory distress    Neurologic:  Mental Status:  alert, oriented to person/place/month/age, follows commands, speech clear, decreased fluency, difficulty with naming (3/6 NIHSS items correct) and reading (1/5 sentences correct), repetition intact   Cranial Nerves:  subtle right visual field cut, PERRL, EOMI with normal smooth pursuit, facial sensation intact and symmetric, facial movements symmetric, hearing not formally tested but intact to conversation, no dysarthria, shoulder shrug strong bilaterally, tongue protrusion midline  Motor:  " normal muscle tone and bulk, BUE tremor, able to move all limbs spontaneously, strength 5/5 throughout upper and lower extremities, no pronator drift, swelling in bilateral hands (2nd digit MCPs) - ? gout  Sensory:  light touch sensation intact and symmetric throughout upper and lower extremities, no extinction on double simultaneous stimulation   Coordination:  normal finger-to-nose and heel-to-shin bilaterally without dysmetria  Station/Gait:  normal width, turn, arm swing           Screenings and Questionnaires:     Tobacco:    Tobacco Use      Smoking status: Never      Smokeless tobacco: Never      Sleep Apnea:       Depression:      6/28/2024     8:44 AM   PHQ-2 ( 1999 [employer])   Q1: Little interest or pleasure in doing things 0   Q2: Feeling down, depressed or hopeless 0   PHQ-2 Score 0       Stroke Recovery and Risk Factors:       No data to display                      Assessment and Plan       1. Left hemispheric embolic strokes in the setting of low EF and LV thrombus   - continue apixaban 5 mg twice daily for secondary stroke prevention   - if anticoagulation is ever discontinued, transition to daily aspirin therapy (81 mg daily)  - continue keppra 1500 mg twice daily; can readdress weaning at next visit   - continue atorvastatin 40 mg daily; LDL goal 40-70  - A1c 6.4; below goal of 7.0  - blood pressure goal 130/80  - Mediterranean diet and 30 minutes physical activity recommended   - follow up with cardiology, echo prior (messaged cardiology)  - sleep clinic as scheduled for + sleep apnea screen   - follow up with neurology provider in ~ 3 months    - patient was also seen by [facility] earlier this week for follow up; if he is already scheduled to follow up with them, he does not need to schedule with us as well     Stroke Education provided.  He will call us with any questions.  For any acute neurologic deficits he was advised to  go directly to the hospital rather than call the  clinic.      Veronica Gray, MEGHAN  Neurology  08/30/2024         ____________________________________________________________________    Billing:  Please note: for coding purposes this visit should be billed only as established and not new, since this patient was seen by my same subspecialty team (ealth Vascular Neurology) during the hospitalization that this visit is a follow up for.  I spent a total of 40 minutes on the day of the visit.   Time spent by me doing chart review, history and exam, documentation and further activities per the note

## 2024-08-30 ENCOUNTER — OFFICE VISIT (OUTPATIENT)
Dept: NEUROLOGY | Facility: CLINIC | Age: 64
End: 2024-08-30
Attending: NURSE PRACTITIONER
Payer: COMMERCIAL

## 2024-08-30 VITALS — DIASTOLIC BLOOD PRESSURE: 89 MMHG | SYSTOLIC BLOOD PRESSURE: 171 MMHG | HEART RATE: 79 BPM | OXYGEN SATURATION: 94 %

## 2024-08-30 DIAGNOSIS — I63.9 ACUTE CVA (CEREBROVASCULAR ACCIDENT) (H): ICD-10-CM

## 2024-08-30 PROCEDURE — 99204 OFFICE O/P NEW MOD 45 MIN: CPT | Performed by: NURSE PRACTITIONER

## 2024-08-30 NOTE — NURSING NOTE
Symptoms or concerns today: hands swollen and some word finding    Med comments: Unknown - handled by the facility he is in currently    Who the patient is here with today: Alone    Gamal Romero

## 2024-08-30 NOTE — LETTER
8/30/2024      Cirilo Gardner  [address] [facility] [address]      Dear Colleague,    Thank you for referring your patient, Cirilo Gardner, to the [facility] NEUROLOGY [facility]. Please see a copy of my visit note below.    __________________________________________________________      [facility]   [phone]  __________________________________________________________         History of Present Illness   Chief Complaint: Patient presents with:  RECHECK: Swollen hands that are improving slowly some word finding difficulty perhaps.      Cirilo Gardner is a 64 year old right haned male presenting for follow-up for left hemispheric strokes.     He was hospitalized at [facility] on 6/12-6/20/24. Prior to the hospital stay, he had no known significant PMHx; he hadn't received medical care in a number of years. He was not on any medications prior to his hospitalization.     He presented to the hospital with confusion, aphasia, right visual field cut. He was outside the window for TNK. He was found to have left hemispheric strokes. TTE revealed low EF and an LV thrombus. He was started on warfarin with a lovenox bridge then transitioned to apixaban.   While hospitalized, stroke code was called on 6/17 for new right sided weakness and worsening aphasia. He was found to have an additional left borderzone infarct. This was thought to be due to embolization of LV thrombus. LV thrombus was no longer visible on follow up TTE. Symptoms prompting stroke code were out of proportion to MRI findings so he was also worked up for seizure. EEG was unrevealing but his exam did improve after receiving a keppra load.     [facility]. He is still getting SLP three times weekly and PT twice weekly. He reports that he has improved quite a bit since discharge but is still having difficulty with his speech. On  exam, he also seems to still have a subtle right sided field cut.     Modified [place] Scale  Score: 3-Moderate disability; requiring some help, but able to walk without assistance    Stroke Evaluation Summarized:  New results resulted and reviewed by me today are in BOLD below.  I personally reviewed the following neuroimaging studies today and the comments above reflect my own personal interpretation of the images: images: CT head, CTA head/neck, MRI brain, and I also showed MRI to the patient myself today.    MRI/Head CT MRI 6/17: new small left anterior border zone infarct along posterior margin of prior infarct, similar appearing subacute infarcts w/ petechial hemorrhage in left posterior border zone    MRI 6/14: left temporal/occipital infarct with petechial hemorrhage, left fontal infarct   Intracranial Vasculature Decreased arborization of distal left M3 branches    Cervical Vasculature Occluded left vertebral artery     CUS: < 50% bilateral ICA stenosis, left vertebral artery occlusion     Echocardiogram 6/19: EF 30-35%, moderately dilated bilateral atrium, no atrial shunt     6/12: EF 15-20%, borderline dilated LV, mild-moderate concentric LVH, severe LV hypokinesia, apical LV thrombus, moderate biatrial enlargement, moderate pulmonary hypertension,    EKG/Telemetry Sinus tachycardia with occasional Premature ventricular complexes   Possible Left atrial enlargement   Nonspecific T wave abnormality    Other Testing Ziopatch: 14-day cardiac monitor.  Single 5-beat VT run.  3 SVT runs, up to 7 beats.  No atrial fibrillation.    EEG: This is an abnormal EEG due to the presence of focal slowing over the left hemisphere. Findings are consistent with the history of left MCA stroke. No seizures.   No symptoms reported.     Labs Lab Results   Component Value Date    LDL 82 06/13/2024    A1C 6.4 (H) 06/12/2024    CTROPT 72 (H) 06/18/2024    INR 1.41 (H) 06/19/2024    INR 1.28 (H) 06/18/2024               Home  "Medications     Current Outpatient Medications   Medication Sig Dispense Refill     apixaban ANTICOAGULANT (ELIQUIS) 5 MG tablet Take 1 tablet (5 mg) by mouth 2 times daily       atorvastatin (LIPITOR) 40 MG tablet Take 1 tablet (40 mg) by mouth every evening       carvedilol (COREG) 12.5 MG tablet Take 1 tablet (12.5 mg) by mouth 2 times daily (with meals) HOLD If sbp <110 or HR <60       citalopram (CELEXA) 10 MG tablet Take 1 tablet (10 mg) by mouth daily       empagliflozin (JARDIANCE) 10 MG TABS tablet Take 1 tablet (10 mg) by mouth daily       furosemide (LASIX) 40 MG tablet Take 1 tablet (40 mg) by mouth daily Hold if sbp <100       gabapentin (NEURONTIN) 100 MG capsule Take 1 capsule (100 mg) by mouth at bedtime       levETIRAcetam (KEPPRA) 750 MG tablet Take 2 tablets (1,500 mg) by mouth 2 times daily       sacubitril-valsartan (ENTRESTO) 49-51 MG per tablet Take 1 tablet by mouth 2 times daily       senna-docusate (SENOKOT-S/PERICOLACE) 8.6-50 MG tablet Take 1 tablet by mouth 2 times daily       spironolactone (ALDACTONE) 25 MG tablet Take 1 tablet (25 mg) by mouth daily HOLD if sbp <115       acetaminophen (TYLENOL) 500 MG tablet Take 1,000 mg by mouth 3 times daily (Patient not taking: Reported on 8/30/2024)       No current facility-administered medications for this visit.            Physical Examination     Physical Exam    Estimated body mass index is 35.65 kg/m  as calculated from the following:    Height as of 7/15/24: 1.702 m (5' 7\").    Weight as of 7/15/24: 103.2 kg (227 lb 9.6 oz).    BP (!) 171/89   Pulse 79   SpO2 94%        General Exam  General: Sitting up in chair in no acute distress  Cardio:  RRR  Pulmonary:  no respiratory distress    Neurologic:  Mental Status:  alert, oriented to person/place/month/age, follows commands, speech clear, decreased fluency, difficulty with naming (3/6 NIHSS items correct) and reading (1/5 sentences correct), repetition intact   Cranial Nerves:  subtle " right visual field cut, PERRL, EOMI with normal smooth pursuit, facial sensation intact and symmetric, facial movements symmetric, hearing not formally tested but intact to conversation, no dysarthria, shoulder shrug strong bilaterally, tongue protrusion midline  Motor:  normal muscle tone and bulk, BUE tremor, able to move all limbs spontaneously, strength 5/5 throughout upper and lower extremities, no pronator drift, swelling in bilateral hands (2nd digit MCPs) - ? gout  Sensory:  light touch sensation intact and symmetric throughout upper and lower extremities, no extinction on double simultaneous stimulation   Coordination:  normal finger-to-nose and heel-to-shin bilaterally without dysmetria  Station/Gait:  normal width, turn, arm swing           Screenings and Questionnaires:     Tobacco:    Tobacco Use      Smoking status: Never      Smokeless tobacco: Never      Sleep Apnea:       Depression:      6/28/2024     8:44 AM   PHQ-2 ( 1999 [employer])   Q1: Little interest or pleasure in doing things 0   Q2: Feeling down, depressed or hopeless 0   PHQ-2 Score 0       Stroke Recovery and Risk Factors:       No data to display                      Assessment and Plan       1. Left hemispheric embolic strokes in the setting of low EF and LV thrombus   - continue apixaban 5 mg twice daily for secondary stroke prevention   - if anticoagulation is ever discontinued, transition to daily aspirin therapy (81 mg daily)  - continue keppra 1500 mg twice daily; can readdress weaning at next visit   - continue atorvastatin 40 mg daily; LDL goal 40-70  - A1c 6.4; below goal of 7.0  - blood pressure goal 130/80  - Mediterranean diet and 30 minutes physical activity recommended   - follow up with cardiology, echo prior (messaged cardiology)  - sleep clinic as scheduled for + sleep apnea screen   - follow up with neurology provider in ~ 3 months    - patient was also seen by [facility] earlier this week for follow  up; if he is already scheduled to follow up with them, he does not need to schedule with us as well     Stroke Education provided.  He will call us with any questions.  For any acute neurologic deficits he was advised to  go directly to the hospital rather than call the clinic.      Veronica Gray NP  Neurology  08/30/2024         ____________________________________________________________________    Billing:  Please note: for coding purposes this visit should be billed only as established and not new, since this patient was seen by my same subspecialty team ([facility]) during the hospitalization that this visit is a follow up for.  I spent a total of 40 minutes on the day of the visit.   Time spent by me doing chart review, history and exam, documentation and further activities per the note          Again, thank you for allowing me to participate in the care of your patient.        Sincerely,        Veronica Gray NP

## 2024-08-30 NOTE — PATIENT INSTRUCTIONS
Left hemispheric embolic strokes in the setting of new heart failure and left ventricular thrombus   - continue apixaban 5 mg twice daily for secondary stroke prevention   - if anticoagulation is ever discontinued, transition to daily aspirin therapy (81 mg daily)  - continue keppra 1500 mg twice daily; can readdress weaning at next visit   - continue atorvastatin 40 mg daily; LDL goal 40-70  - A1c 6.4; below goal of 7.0  - blood pressure goal 130/80  - Mediterranean diet and 30 minutes physical activity recommended   - follow up with cardiology, echo prior (messaged cardiology to call your friend to schedule)  - sleep clinic as scheduled for + sleep apnea screen       Stroke Clinic Phone Number: [phone]  Stroke Prevention Recommendations  High blood pressure, or hypertension, is a leading cause of stroke and the most significant controllable risk factor. You should aim to keep your blood pressure below 130/80.     Smoking cessation: The nicotine and carbon monoxide in cigarette smoke damage the cardiovascular system and pave the way for a stroke. If you use nicotine, please reach out to your primary care provider for assistance with quitting or consider utilizing one of [place]'s free quit support programs (https://[email or web address]/communities/tobacco/quitting/index.html)    If you have Type 1 or 2 diabetes, control you blood sugar. You should aim to keep your HGBA1C below 7.0.     Eat an overall health dietary pattern that emphasizes:  - a wide variety of fruits and vegetables   - whole grains and products made up of mostly whole grains   - healthy sources of protein (mostly plants such as legumes and nuts; fish and seafood; low-fat or non-fat dairy; and, if you eat meat and poultry, ensuring it is lean and unprocessed)  - liquid non-tropical vegetable oils  - minimally processed foods   - minimize intake of added sugars  - foods prepared with little or no salt  - limited or preferably no alcohol  intake    Aim for being active at least 150 minutes a week, but if you don't want to sweat the numbers, just move more and sit less.    Excess body weight and obesity are linked with an increased risk of high blood pressure, diabetes, heart disease, and stroke. Losing as little as 5 to 10 pounds can make a significant difference in your risks.  [facility] has a Comprehensive Weight Management program if you would like assistance/support: [email or web address]://[email or web address]/treatments/comprehensive-weight-management    Large amounts of cholesterol in the blood can build up and cause blood clots - leading to a stroke. Aim to keep your LDL cholesterol between 40 and 70.     Recrudescence  Recrudescence of stroke symptoms refers to when a person experiences previously resolved symptoms of a stroke. These symptoms are usually mild and resolve within a few days. The condition is also sometimes referred to as ischemic stroke recrudescence. In simple terms, ischemic stroke recrudescence, or anamnestic syndrome, occurs when someone redevelops symptoms they experienced after having a stroke they had previously recovered from. This may result in the reappearance of neurological or physical symptoms associated with stroke. In most cases, people tend to experience a mild, rather than severe, worsening or return of symptoms. Recognized triggers of ischemic stroke recrudescence include:  - infections  - stress  - conditions that cause metabolic dysregulation, such as diabetes  - insomnia or lack of proper sleep  - hypertension (high blood pressure)  - low sodium levels (hyponatremia)  - use of sedating medications, such as benzodiazepines, and anesthetic drugs, such as midazolam hydrochloride and fentanyl citrate    Call 911 if these signs are present

## 2024-09-23 ENCOUNTER — HOSPITAL ENCOUNTER (OUTPATIENT)
Dept: CARDIOLOGY | Facility: CLINIC | Age: 64
Discharge: HOME | End: 2024-09-23
Attending: NURSE PRACTITIONER | Admitting: NURSE PRACTITIONER
Payer: COMMERCIAL

## 2024-09-23 DIAGNOSIS — I42.9 CARDIOMYOPATHY, UNSPECIFIED TYPE (H): ICD-10-CM

## 2024-09-23 DIAGNOSIS — I51.3 LV (LEFT VENTRICULAR) MURAL THROMBUS: ICD-10-CM

## 2024-09-23 LAB — LVEF ECHO: NORMAL

## 2024-09-23 PROCEDURE — 255N000002 HC RX 255 OP 636: Performed by: NURSE PRACTITIONER

## 2024-09-23 PROCEDURE — C8929 TTE W OR WO FOL WCON,DOPPLER: HCPCS

## 2024-09-23 PROCEDURE — 93306 TTE W/DOPPLER COMPLETE: CPT | Mod: 26 | Performed by: INTERNAL MEDICINE

## 2024-09-23 RX ADMIN — HUMAN ALBUMIN MICROSPHERES AND PERFLUTREN 9 ML: 10; .22 INJECTION, SOLUTION INTRAVENOUS at 08:45

## 2024-10-06 ENCOUNTER — HEALTH MAINTENANCE LETTER (OUTPATIENT)
Age: 64
End: 2024-10-06

## 2024-10-07 ENCOUNTER — OFFICE VISIT (OUTPATIENT)
Dept: CARDIOLOGY | Facility: CLINIC | Age: 64
End: 2024-10-07
Attending: NURSE PRACTITIONER
Payer: COMMERCIAL

## 2024-10-07 VITALS
BODY MASS INDEX: 32.98 KG/M2 | HEIGHT: 69 IN | HEART RATE: 69 BPM | DIASTOLIC BLOOD PRESSURE: 70 MMHG | OXYGEN SATURATION: 94 % | SYSTOLIC BLOOD PRESSURE: 110 MMHG | WEIGHT: 222.7 LBS

## 2024-10-07 DIAGNOSIS — I63.9 ACUTE CVA (CEREBROVASCULAR ACCIDENT) (H): ICD-10-CM

## 2024-10-07 DIAGNOSIS — I42.9 CARDIOMYOPATHY, UNSPECIFIED TYPE (H): ICD-10-CM

## 2024-10-07 DIAGNOSIS — I50.23 ACUTE ON CHRONIC SYSTOLIC HEART FAILURE (H): ICD-10-CM

## 2024-10-07 DIAGNOSIS — I51.3 LV (LEFT VENTRICULAR) MURAL THROMBUS: ICD-10-CM

## 2024-10-07 PROCEDURE — 99214 OFFICE O/P EST MOD 30 MIN: CPT | Performed by: NURSE PRACTITIONER

## 2024-10-07 NOTE — PATIENT INSTRUCTIONS
If you have questions or concerns please call the [facility] nurse team at [phone] or send a Strap message (Mon-Fri 8am-4pm).  If you have concerns after hours, please call [phone], option 2 to speak with an on call Cardiologist.    Medication changes and/or recommendations from today:   - No medication changes today.  - Continue to check your weights daily and try to stick to a low sodium diet (under 2,000 mg/day).  - Call the [facility] nurse team at the number listed above if you develop signs concerning for fluid retention, including weight gain of over 2 pounds in 1 day or over 5 pounds in 1 week, increasing shortness of breath, or increasing swelling in the legs or abdomen.    Follow up plan:   - I'll check in with Dr. Sung regarding continuing the blood thinner with Eliquis since the pumping function of your heart is improved.  - Follow up with Dr. Sung in about 3 months    It was a pleasure seeing you today!     DEBRA Mckeon, CNP  Nurse Practitioner  [facility]    Thank you for your visit with the [facility] today. [facility] stands for [facility].    The [facility] is a heart failure specialty clinic within the [facility] where you will work with specialized nurse practitioners, physician assistants, doctors, and registered nurses. They are dedicated to helping patients with heart failure to carefully adjust medications, receive education, and learn who and when to call if symptoms develop. They specialize in helping you better understand your condition, slow the progression of your disease, improve the length and quality of your life, help you detect future heart problems before they become life threatening, and avoid hospitalizations.

## 2024-10-07 NOTE — Clinical Note
10/7/2024    Physician No Ref-Primary  No address on file    RE: Cirilo Gardner       Dear Colleague,     I had the pleasure of seeing Cirilo Gardner in the [facility].    Cardiology Clinic Progress Note    C.O.R.E. Clinic Visit (Heart Failure Specialty Clinic)    Service Date: October 7, 2024  Primary Cardiology Team: Dr. Sung    HPI:   I had the pleasure of seeing Mr. Cirilo Gardner in the clinic today. He is a very pleasant 64 year old male with a past medical history notable for obesity and no other known medical history as he has not seen a doctor in many years. He was admitted to [facility] on 6/12/2024 after presenting with confusion and expressive/receptive aphasia along with a right visual cut and being found to have an acute left MCA stroke. Cardiology was consulted as the patient was found to have a new diagnosis of a cardiomyopathy with severe LV systolic dysfunction, ejection fraction 15-20% and LV apical thrombus on echocardiogram. He was diuresed on IV Lasix and started on guideline directed medical therapy as outlined below. CT calcium score was completed on 6/18/24 with a total Agatston calcium score is 23.4 placing the patient in the 37th percentile when compared to age and gender matched control group with mild coronary calcifications suggesting against ischemic etiology for his cardiomyopathy.  A repeat echo prior to discharge showed gradual improvement in his EF at 30-35%. He was discharged with a 14-day Zio patch monitor for further evaluation of the possibility of underlying atrial fibrillation contributing to his stroke and cardiomyopathy. This showed predominantly sinus rhythm with an average heart rate of 70 bpm, a single 5-beat run of NSVT, and 3 transient runs of SVT up to 7 beats.  No atrial fibrillation was noted. His discharge weight was 252 pounds, and his weight has continued to gradually trend down to 242 pounds in  the clinic today on a diuretic regimen of p.o. Lasix at 40 mg once daily.     I saw Mr. Gardner in the clinic in follow-up of his hospitalization on 6/28/24. He was doing quite well at that time without evidence of significant volume overload.  He has been staying at [facility] for continued physical therapy/rehab following his stroke. He unfortunately has continued to have significant residual deficits with expressive aphasia. He was continued on Lasix 40 mg daily, carvedilol 25 mg twice daily, Entresto 24-26 mg twice daily, spironolactone 25 mg daily, and Jardiance 10 mg daily.    A repeat echocardiogram was completed on 9/23/24 showing a mildly dilated left ventricle with low normal LV systolic function with EF 50-55%. There is mild global hypokinesia of the left ventricle. There is no thrombus seen in the left ventricle. The right ventricle is mildly dilated with mildly decreased right ventricular systolic function. Mild to moderate (1-2+) mitral regurgitation is noted. Right ventricular systolic pressure is elevated, consistent with moderate pulmonary hypertension.    Today, Mr. Gardner presents for routine follow-up in the [facility] clinic. ***     ASSESSMENT:  Recently diagnosed cardiomyopathy and biventricular heart failure, likely with a component of diastolic heart failure as well  - LVEF 15-20% initially, improved to 50-55% on most recent TTE 9/23/24 with mildly reduced RV systolic function.   - Etiology: Unclear at this time--Suspected nonischemic based on CT coronary calcium scan findings  - Fluid status: Challenging to assess due to body habitus, but appears well compensated with NT pro BNP level within normal limits and weight stable at 222 lbs today.  - Diuretic regimen: Lasix 40 mg once daily  - Ischemic evaluation: Not completed yet, but CT calcium score 6/18/24--Showing relatively low calcium score of 23.4 suggesting against occlusive CAD     Guideline directed medical therapy (GDMT):  - Beta  blocker: Carvedilol 25 mg BID  - ACEI/ARB/ARNI: Entresto 24-26 mg BID   - Aldactone antagonist: Spironolactone 25 mg daily   - SGLT2 inhibitor: Jardiance 10 mg once daily.     LV apical thrombus measuring 1.5 x 1.9 cm. Initially started on warfarin, but transitioned to Eliquis prior to discharge.  Acute left MCA stroke likely due to problem #2  Residual expressive/receptive aphasia and confusion secondary to problem #3  Obesity  Bilateral lower extremity edema    PLAN:  - Ideally would like to try to go up on his Entresto, ***  - ***.  - Follow-up in the [facility] in about ***. If EF remains significantly reduced at that time, would consider cardiac MRI with stress for further ischemic evaluation and rule out other potential causes for his cardiomyopathy.  - Continue to check daily weights and try to stick to a low-sodium diet. Provided [facility] clinic folder with information regarding heart failure management and signs/symptoms to watch for for when to call the clinic.    Thank you for the opportunity to participate in this patient's care. We would be happy to see him sooner if needed for any concerns in the meantime.      *** total minutes was spent today including chart review, precharting, history and exam, post visit documentation, and reviewing studies as outlined above.     DEBRA Mckeon, CNP   Nurse Practitioner  [facility]  Pager: [phone]  Text Page  (8am - 5pm, M-F)    Orders this Visit:  No orders of the defined types were placed in this encounter.    No orders of the defined types were placed in this encounter.    There are no discontinued medications.  Encounter Diagnoses   Name Primary?    Acute CVA (cerebrovascular accident) (H)     Cardiomyopathy, unspecified type (H)     LV (left ventricular) mural thrombus     Acute on chronic systolic heart failure (H)          CURRENT MEDICATIONS:  Current Outpatient Medications   Medication Sig Dispense Refill    apixaban ANTICOAGULANT  (ELIQUIS) 5 MG tablet Take 1 tablet (5 mg) by mouth 2 times daily      atorvastatin (LIPITOR) 40 MG tablet Take 1 tablet (40 mg) by mouth every evening      carvedilol (COREG) 12.5 MG tablet Take 1 tablet (12.5 mg) by mouth 2 times daily (with meals) HOLD If sbp <110 or HR <60      citalopram (CELEXA) 10 MG tablet Take 1 tablet (10 mg) by mouth daily      empagliflozin (JARDIANCE) 10 MG TABS tablet Take 1 tablet (10 mg) by mouth daily      furosemide (LASIX) 40 MG tablet Take 1 tablet (40 mg) by mouth daily Hold if sbp <100      gabapentin (NEURONTIN) 100 MG capsule Take 1 capsule (100 mg) by mouth at bedtime      levETIRAcetam (KEPPRA) 750 MG tablet Take 2 tablets (1,500 mg) by mouth 2 times daily      sacubitril-valsartan (ENTRESTO) 49-51 MG per tablet Take 1 tablet by mouth 2 times daily      senna-docusate (SENOKOT-S/PERICOLACE) 8.6-50 MG tablet Take 1 tablet by mouth 2 times daily      spironolactone (ALDACTONE) 25 MG tablet Take 1 tablet (25 mg) by mouth daily HOLD if sbp <115      acetaminophen (TYLENOL) 500 MG tablet Take 1,000 mg by mouth 3 times daily (Patient not taking: Reported on 8/30/2024)       ALLERGIES  No Known Allergies    PAST MEDICAL, SURGICAL, FAMILY HISTORY:  History was reviewed and updated as needed, see medical record.    SOCIAL HISTORY:  Social History     Socioeconomic History    Marital status: Single     Spouse name: Not on file    Number of children: Not on file    Years of education: Not on file    Highest education level: Not on file   Occupational History    Not on file   Tobacco Use    Smoking status: Never    Smokeless tobacco: Never   Substance and Sexual Activity    Alcohol use: Not on file    Drug use: Not on file    Sexual activity: Not on file   Other Topics Concern    Not on file   Social History Narrative    Not on file     Social Determinants of Health     Financial Resource Strain: Not on file   Food Insecurity: Not on file   Transportation Needs: Not on file   Physical  "Activity: Not on file   Stress: Not on file   Social Connections: Not on file   Interpersonal Safety: Not on file   Housing Stability: Not on file     Review of Systems:  Focused cardiovascular and respiratory review of systems is negative other than the symptoms noted above in the HPI.     Physical Exam:  Vitals: /70 (BP Location: Right arm, Patient Position: Sitting, Cuff Size: Adult Regular)   Pulse 69   Ht 1.753 m (5' 9\")   Wt 101 kg (222 lb 11.2 oz)   SpO2 94%   BMI 32.89 kg/m     Wt Readings from Last 4 Encounters:   10/07/24 101 kg (222 lb 11.2 oz)   07/15/24 103.2 kg (227 lb 9.6 oz)   07/09/24 104.3 kg (230 lb)   07/02/24 108.9 kg (240 lb)     CONSTITUTIONAL: Alert and in no acute distress.  NECK: Thick neck.  Unable to visualize JVP due to body habitus.  CARDIOVASCULAR:  Regular rate and rhythm. No murmur, rub or gallop.   RESPIRATORY: Breathing non-labored. Lungs are clear to auscultation with no wheezes or crackles bilaterally.  GASTROINTESTINAL: Abdomen non-distended.  EXTREMITIES: Legs wrapped making it challenging to assess degree of edema, but appears to be around 1-2+ edema in the ankles bilaterally.   NEUROPSYCHIATRIC: Expressive aphasia. Affect appropriate.     Recent Lab Results:  LIPID RESULTS:  Lab Results   Component Value Date    CHOL 109 07/17/2024    HDL 41 06/13/2024    LDL 82 06/13/2024    TRIG 58 06/13/2024     CBC RESULTS:  Lab Results   Component Value Date    WBC 4.2 07/17/2024    RBC 5.43 07/17/2024    HGB 16.2 07/17/2024    HCT 50.8 07/17/2024    MCV 94 07/17/2024    MCH 29.8 07/17/2024    MCHC 31.9 07/17/2024    RDW 12.3 07/17/2024     07/17/2024     BMP RESULTS:  Lab Results   Component Value Date     07/17/2024    POTASSIUM 3.1 (L) 07/17/2024    CHLORIDE 104 07/17/2024    CO2 26 07/17/2024    ANIONGAP 14 07/17/2024     (H) 07/17/2024     (H) 06/20/2024    BUN 16.0 07/17/2024    CR 0.82 07/17/2024    GFRESTIMATED >90 07/17/2024    PARISH 10.7 (H) " 07/17/2024      A1C RESULTS:  Lab Results   Component Value Date    A1C 6.4 (H) 06/12/2024     Please kindly note that this document was completed in part using Dragon voice recognition software. Although reviewed after completion, some word substitutions and typographical errors may occur. Please contact me if clarification is needed.       Thank you for allowing me to participate in the care of your patient.      Sincerely,     Colton Mejias NP     [facility]  cc:   Colton Mejias NP  [address] [id number]

## 2024-10-07 NOTE — Clinical Note
10/7/2024    Cirilo Gardner   1960        To Whom it May Concern;    Please excuse Cirilo Gardner from work/school for a healthcare visit on Oct 7, 2024.    Sincerely,        Colton Mejias NP

## 2024-10-07 NOTE — PROGRESS NOTES
Cardiology Clinic Progress Note    C.O.R.E. Clinic Visit (Heart Failure Specialty Clinic)    Service Date: October 7, 2024  Primary Cardiology Team: Dr. Pineda PEOPLES:   I had the pleasure of seeing . Cirilo Gardner in the clinic today. He is a very pleasant 64 year old male with a past medical history notable for obesity and no other known medical history as he has not seen a doctor in many years. He was admitted to [facility] on 6/12/2024 after presenting with confusion and expressive/receptive aphasia along with a right visual cut and being found to have an acute left MCA stroke. Cardiology was consulted as the patient was found to have a new diagnosis of a cardiomyopathy with severe LV systolic dysfunction, ejection fraction 15-20% and LV apical thrombus on echocardiogram. He was diuresed on IV Lasix and started on guideline directed medical therapy as outlined below. CT calcium score was completed on 6/18/24 with a total Agatston calcium score is 23.4 placing the patient in the 37th percentile when compared to age and gender matched control group with mild coronary calcifications suggesting against ischemic etiology for his cardiomyopathy.  A repeat echo prior to discharge showed gradual improvement in his EF at 30-35%. He was discharged with a 14-day Zio patch monitor for further evaluation of the possibility of underlying atrial fibrillation contributing to his stroke and cardiomyopathy. This showed predominantly sinus rhythm with an average heart rate of 70 bpm, a single 5-beat run of NSVT, and 3 transient runs of SVT up to 7 beats.  No atrial fibrillation was noted. His discharge weight was 252 pounds, and his weight has continued to gradually trend down to 242 pounds in the clinic today on a diuretic regimen of p.o. Lasix at 40 mg once daily.     I saw Mr. Gardner in the clinic in follow-up of his hospitalization on 6/28/24. He was doing quite well at that time without  evidence of significant volume overload.  He has been staying at [facility] for continued physical therapy/rehab following his stroke. He unfortunately has continued to have significant residual deficits with expressive aphasia. He was continued on Lasix 40 mg daily, carvedilol 25 mg twice daily, Entresto 24-26 mg twice daily, spironolactone 25 mg daily, and Jardiance 10 mg daily.    A repeat echocardiogram was completed on 9/23/24 showing a mildly dilated left ventricle with low normal LV systolic function with EF 50-55%. There is mild global hypokinesia of the left ventricle. There is no thrombus seen in the left ventricle. The right ventricle is mildly dilated with mildly decreased right ventricular systolic function. Mild to moderate (1-2+) mitral regurgitation is noted. Right ventricular systolic pressure is elevated, consistent with moderate pulmonary hypertension.    Labs were checked last week on 10/2/24 through another provider with the BMP showing stable electrolytes and renal function with potassium level 4.3 and creatinine at 0.66.  A lipid profile shows total cholesterol 113, triglycerides at 87, HDL 33, and LDL at 60. Hemoglobin A1c is at 6.4%. CBC shows an interval drop in his hemoglobin to 13.0 from 16.2 on a previous check from 7/17/24. He states that he has been tolerating Eliquis well without any significant bleeding issues or any hematuria or dark/bloody stools.    Today, Mr. Gardner presents for routine follow-up in the [facility] clinic. He states that he has been feeling quite well from a cardiac standpoint. He was discharged from [facility] to an [facility] on 7/19/24. He notes that he has a workout room at his facility and he has been exercising around 50 minutes most days. He feels that he is tolerating this quite well from a cardiac standpoint and denies any symptoms of exertional dyspnea or chest pain. He has not noticed any symptoms of palpitations. He occasionally will note some mild  postural lightheadedness, but he has not had any severe dizziness, presyncope, or syncope. He notes that he had significant lower extremity edema towards the end of the summer still, but this has since resolved.  He feels that his speech has improved working with speech-language pathology over the past few months, but he still does have some residual issues with word finding and expressive aphasia to a milder degree than when I saw him earlier this past summer.    ASSESSMENT:  Recently diagnosed cardiomyopathy and biventricular heart failure, likely with a component of diastolic heart failure as well  - LVEF 15-20% initially, improved to 50-55% on most recent TTE 9/23/24 with mildly reduced RV systolic function.   - Etiology: Unclear at this time--Suspected nonischemic based on CT coronary calcium scan findings  - Fluid status: Challenging to assess due to body habitus, but appears well compensated with NT pro BNP level within normal limits and weight stable at 222 lbs today.  - Diuretic regimen: Lasix 40 mg once daily  - Ischemic evaluation: Not completed yet, but CT calcium score 6/18/24--Showing relatively low calcium score of 23.4 suggesting against occlusive CAD     Guideline directed medical therapy (GDMT):  - Beta blocker: Carvedilol 25 mg BID  - ACEI/ARB/ARNI: Entresto 24-26 mg BID   - Aldactone antagonist: Spironolactone 25 mg daily   - SGLT2 inhibitor: Jardiance 10 mg once daily.     LV apical thrombus noted by TTE 6/14/24. Initially started on warfarin, but transitioned to Eliquis prior to discharge. No LV thrombus noted by repeat TTE 9/23/24 with EF improved.  Acute left MCA stroke likely due to problem #2  Residual expressive aphasia   Obesity    PLAN:  - Given resolution of the previously noted LV thrombus normalization of his LV systolic function, will discuss with Dr. Sung regarding consideration of discontinuing anticoagulation with Eliquis. Ambulatory cardiac monitoring has not revealed any  evidence of atrial fibrillation.   - Continue the remainder of his current cardiac regimen without changes  - Counseled on continuing to get regular exercise, continuing to check daily weights, and trying to stick to a low-sodium diet.   - Recommend follow up with Dr. Sung in about 3 months.     Thank you for the opportunity to participate in this patient's care. We would be happy to see him sooner if needed for any concerns in the meantime.      35 total minutes was spent today including chart review, precharting, history and exam, post visit documentation, and reviewing studies as outlined above.     DEBRA Mckeon, CNP   Nurse Practitioner  [facility]  Pager: [phone]  Text Page  (8am - 5pm, M-F)    Orders this Visit:  Orders Placed This Encounter   Procedures    Follow-Up with Cardiology     No orders of the defined types were placed in this encounter.    There are no discontinued medications.  Encounter Diagnoses   Name Primary?    Acute CVA (cerebrovascular accident) (H)     Cardiomyopathy, unspecified type (H)     LV (left ventricular) mural thrombus     Acute on chronic systolic heart failure (H)      CURRENT MEDICATIONS:  Current Outpatient Medications   Medication Sig Dispense Refill    apixaban ANTICOAGULANT (ELIQUIS) 5 MG tablet Take 1 tablet (5 mg) by mouth 2 times daily      atorvastatin (LIPITOR) 40 MG tablet Take 1 tablet (40 mg) by mouth every evening      carvedilol (COREG) 12.5 MG tablet Take 1 tablet (12.5 mg) by mouth 2 times daily (with meals) HOLD If sbp <110 or HR <60      citalopram (CELEXA) 10 MG tablet Take 1 tablet (10 mg) by mouth daily      empagliflozin (JARDIANCE) 10 MG TABS tablet Take 1 tablet (10 mg) by mouth daily      furosemide (LASIX) 40 MG tablet Take 1 tablet (40 mg) by mouth daily Hold if sbp <100      gabapentin (NEURONTIN) 100 MG capsule Take 1 capsule (100 mg) by mouth at bedtime      levETIRAcetam (KEPPRA) 750 MG tablet Take 2 tablets (1,500 mg)  "by mouth 2 times daily      sacubitril-valsartan (ENTRESTO) 49-51 MG per tablet Take 1 tablet by mouth 2 times daily      senna-docusate (SENOKOT-S/PERICOLACE) 8.6-50 MG tablet Take 1 tablet by mouth 2 times daily      spironolactone (ALDACTONE) 25 MG tablet Take 1 tablet (25 mg) by mouth daily HOLD if sbp <115      acetaminophen (TYLENOL) 500 MG tablet Take 1,000 mg by mouth 3 times daily (Patient not taking: Reported on 8/30/2024)       ALLERGIES  No Known Allergies    PAST MEDICAL, SURGICAL, FAMILY HISTORY:  History was reviewed and updated as needed, see medical record.    SOCIAL HISTORY:  Social History     Socioeconomic History    Marital status: Single     Spouse name: Not on file    Number of children: Not on file    Years of education: Not on file    Highest education level: Not on file   Occupational History    Not on file   Tobacco Use    Smoking status: Never    Smokeless tobacco: Never   Substance and Sexual Activity    Alcohol use: Not on file    Drug use: Not on file    Sexual activity: Not on file   Other Topics Concern    Not on file   Social History Narrative    Not on file     Social Determinants of Health     Financial Resource Strain: Not on file   Food Insecurity: Not on file   Transportation Needs: Not on file   Physical Activity: Not on file   Stress: Not on file   Social Connections: Not on file   Interpersonal Safety: Not on file   Housing Stability: Not on file     Review of Systems:  Focused cardiovascular and respiratory review of systems is negative other than the symptoms noted above in the HPI.     Physical Exam:  Vitals: /70 (BP Location: Right arm, Patient Position: Sitting, Cuff Size: Adult Regular)   Pulse 69   Ht 1.753 m (5' 9\")   Wt 101 kg (222 lb 11.2 oz)   SpO2 94%   BMI 32.89 kg/m     Wt Readings from Last 4 Encounters:   10/07/24 101 kg (222 lb 11.2 oz)   07/15/24 103.2 kg (227 lb 9.6 oz)   07/09/24 104.3 kg (230 lb)   07/02/24 108.9 kg (240 lb) "     CONSTITUTIONAL: Alert and in no acute distress.  NECK: Thick neck.  Unable to visualize JVP due to body habitus.  CARDIOVASCULAR:  Regular rate and rhythm. No murmur, rub or gallop.   RESPIRATORY: Breathing non-labored. Lungs are clear to auscultation with no wheezes or crackles bilaterally.  GASTROINTESTINAL: Abdomen non-distended.  EXTREMITIES: No pitting lower extremity edema bilaterally.   NEUROPSYCHIATRIC: Mild expressive aphasia. Affect appropriate.     Recent Lab Results:  LIPID RESULTS:  Lab Results   Component Value Date    CHOL 109 07/17/2024    HDL 41 06/13/2024    LDL 82 06/13/2024    TRIG 58 06/13/2024     CBC RESULTS:  Lab Results   Component Value Date    WBC 4.2 07/17/2024    RBC 5.43 07/17/2024    HGB 16.2 07/17/2024    HCT 50.8 07/17/2024    MCV 94 07/17/2024    MCH 29.8 07/17/2024    MCHC 31.9 07/17/2024    RDW 12.3 07/17/2024     07/17/2024     BMP RESULTS:  Lab Results   Component Value Date     07/17/2024    POTASSIUM 3.1 (L) 07/17/2024    CHLORIDE 104 07/17/2024    CO2 26 07/17/2024    ANIONGAP 14 07/17/2024     (H) 07/17/2024     (H) 06/20/2024    BUN 16.0 07/17/2024    CR 0.82 07/17/2024    GFRESTIMATED >90 07/17/2024    PARISH 10.7 (H) 07/17/2024      A1C RESULTS:  Lab Results   Component Value Date    A1C 6.4 (H) 06/12/2024     Please kindly note that this document was completed in part using Dragon voice recognition software. Although reviewed after completion, some word substitutions and typographical errors may occur. Please contact me if clarification is needed.

## 2024-10-28 ENCOUNTER — TELEPHONE (OUTPATIENT)
Dept: CARDIOLOGY | Facility: CLINIC | Age: 64
End: 2024-10-28
Payer: COMMERCIAL

## 2024-10-28 NOTE — TELEPHONE ENCOUNTER
Please update Mr. Gardner that I discussed things with Dr. Sung and recommend continuing anticoagulation with eliquis for a 6 month course. Can revisit this at the follow up visit in December and could consider stopping Eliquis at that time as long as he is willing to continue GDMT for his cardiomyopathy. Thank you!

## 2024-10-28 NOTE — TELEPHONE ENCOUNTER
[facility]   Messaged pt via [employer] comments and recommendations from MEGHAN Bailon/Dr. Sung.     Future Appointments   Date Time Provider Department Center   10/30/2024  1:00 PM Goltz, Bennett Ezra, PA-C [facility]   12/5/2024  9:45 AM Ramiro Sung MD [facility]      Meka Marie RN, BSN  10/28/24 at 10:19 AM

## 2024-10-29 NOTE — PROGRESS NOTES
Outpatient Sleep Medicine Consultation:      Name: Cirilo Gardner MRN# [id number]   Age: 64 year old YOB: 1960     Date of Consultation: October 29, 2024  Consultation is requested by: DEBRA [facility]  NEUROLOGY STROKE & NEUROCRITICAL CARE  [address] [place] [id number] Denisse Rodriguez  Primary care provider: No Ref-Primary, Physician       Reason for Sleep Consult:     Cirilo Gardner is sent by Denisse Rodriguez for a sleep consultation regarding CVA.    Patient s Reason for visit  Cirilo Gardner main reason for visit:    Patient states problem(s) started:    Cirilo Gardner's goals for this visit:             Assessment and Plan:     Summary Sleep Diagnoses and Recommendations:  (I63.9) Acute CVA (cerebrovascular accident) (H)  (primary encounter diagnosis)  Comment: Josue presents for evaluation of sleep apnea in the setting of stroke (in June). He is not observed while sleeping, so it is unknown if he snores. He is napping often in the daytime due to an irregular sleep/wake rhythm. It is difficult to say if he would have excessive daytime sleepiness if he were on a normal sleep schedule. He did not go to the doctor prior to his stroke, so it is not clear whether or not he had HTN. He did have acute systolic CHF at the time of his stroke. His weight was up to 279# and he has gotten that back down to 219#. He reports he is off of diuretics now. His LVEF is up to 50-55% from 15-20% in June. There was moderate pulmonary HTN though. Prior to his stroke, he was consuming a 12 pack about 5 times per week. He has discontinued alcohol since the stroke.  Other positive risk factors for apnea include age >50 (64), neck >40 cm (45 cm), male gender. His BMI is subthreshold at 30.  Plan: Comprehensive Sleep Study        In lab PSG. I prescribed a 5 mg tab of zolpidem for the study in case he has had difficulty getting his sleep schedule advanced.     (G47.23)  Irregular sleep-wake rhythm  Comment: He has been falling asleep around 2-4 AM, waking at 7 AM for medications, and napping for at least a few hours most days. Sometimes he naps more than once in a day. He has been reading on his phone in bed.  Plan: We discussed gradually weaning down the duration of naps to help increase his sleep drive at night. He was also encouraged to avoid electronics in the hour before bedtime.           Comorbid Diagnoses:  Patient Active Problem List   Diagnosis    Visual field cut    H/O Lt MCA embolic infarction    Severe left ventricular systolic dysfunction (LVSD)    LV (left ventricular) mural thrombus    Occlusion and stenosis of left vertebral artery    History of left watershed infarction    Expressive aphasia syndrome        Summary Counseling:    Sleep Testing Reviewed  Obstructive Sleep Apnea Reviewed  Complications of Untreated Sleep Apnea Reviewed      Patient will follow up 3 months after sleep study. [facility] and initiate treatment before the follow up if indicated.  Bennett Goltz, PA-C      Total time spent reviewing medical records, history and physical examination, review of previous testing and interpretation as well as documentation on this date:60 min    CC: Physician No Ref-Primary          History of Present Illness:   He presents with his friend Jo-Ann, who is power of .  Cirilo Gardner presents for evaluation of sleep disorders in the setting of recent stroke. He used to sleep fine prior to the stroke (although was drinking heavily). Since the stroke, his sleep scheduling has gotten off track.     He sleeps on his sides. He is not observed while sleeping, so it is unclear if he snores. He presents today with his friend, who is his power of . They were roommates about 8 years ago and she did not observe much snoring at that time. His weight was roughly equivalent to his current weight.  He weighed 279# in  June when admitted for his stroke. He was retaining fluid. He is back down to 219# today. He denies significant sleepiness prior to his stroke, but his work started early. He would be o [employer] job site from 5-7 AM and then go home and nap.   Currently, he goes out socializing in the evening and may get home around 10 PM.    He has been awake a lot at night, until about 4 AM on some nights, since the stroke.     ECHO 9/23/24: mildly dilated left ventricle with low normal LV systolic function with EF 50-55% (up from 15-20% in June). There is mild global hypokinesia of the left ventricle. There is no thrombus seen in the left ventricle. The right ventricle is mildly dilated with mildly decreased right ventricular systolic function. Mild to moderate (1-2+) mitral regurgitation is noted. Right ventricular systolic pressure is elevated, consistent with moderate pulmonary hypertension.   Past Sleep Evaluations: None     SLEEP-WAKE SCHEDULE:     Work/School Days: Patient goes to school/work: (Patient-Rptd) No   Usually gets into bed at   [place]  Takes patient about (Patient-Rptd) 1 hour to fall asleep (highly variable). He reads until about 2 AM, sometimes does not fall asleep until 4 AM.  Has trouble falling asleep (Patient-Rptd) 3 times nights per week  Wakes up in the middle of the night (Patient-Rptd) 2 times.  Wakes up due to (Patient-Rptd) Other RRx2  He has trouble falling back asleep 0  times a week.   It usually takes  5 min to get back to sleep  Patient is usually up at   7 AM for medication. He has another medication at 8 AM.   Uses alarm: (Patient-Rptd) Yes    Weekends/Non-work Days/All Other Days:  Usually gets into bed at (Patient-Rptd) 11   Takes patient about (Patient-Rptd) 1 to fall asleep  Patient is usually up at    Uses alarm: (Patient-Rptd) Yes    Sleep Need  Patient gets  ?  sleep on average   Patient thinks he needs about   sleep    Cirilo Gardner prefers to sleep in this position(s):   side  Patient  states they do the following activities in bed:  reads. Electronics, reads on his phone. No TV    Naps  Patient takes a purposeful nap  2-3 times a week.  He goes back to sleep after his 8 AM medication for 3 hours, 2-3 times per week. If he has therapy and does not get the morning nap, he may sometimes nap later in the day. He sometimes naps after PT even if he naps at 8 AM.    He feels better after a nap:   yes  He dozes off unintentionally  0 days per week  Patient has had a driving accident or near-miss due to sleepiness/drowsiness:   not driving currently      SLEEP DISRUPTIONS:    Breathing/Snoring  Patient snores:  not observed now  Other people complain about his snoring:    Patient has been told he stops breathing in his sleep:  no  He has issues with the following:  . Morning headaches: no. Nocturnal heartburn or reflux: no. Nasal congestion at night: mild recently, possibly a medication effect. He has a nasal polyp.    Movement:  Patient gets pain, discomfort, with an urge to move:  No  restless legs symptoms  It happens when he is resting:     It happens more at night:     Patient has been told he kicks his legs at night:    no     Behaviors in Sleep:  Cirilo Gardner has experienced the following behaviors while sleeping: (Patient-Rptd) See or hear things that are not really there upon awakening or just falling asleep- he denies this in conversation today.   Pt denies bruxism, sleep talking, sleep walking, and dream enactment behavior. Pt denies sleep paralysis, hypnagogue and cataplexy.       Is there anything else you would like your sleep provider to know:        CAFFEINE AND OTHER SUBSTANCES:    Patient consumes caffeinated beverages per day:    was drinking 5-6 Cokes instead of alcohol after his stroke, but recently quit caffeine  Last caffeine use is usually:    List of any prescribed or over the counter stimulants that patient takes:    List of any prescribed or over the counter sleep  medication patient takes:    List of previous sleep medications that patient has tried:    Patient drinks alcohol to help them sleep: (Patient-Rptd) No  Patient drinks alcohol near bedtime: (Patient-Rptd) No used to have a 12 pack 5 times per week, quit with stroke    Family History:  Patient has a family member been diagnosed with a sleep disorder: (Patient-Rptd) No        Social History:  currently living at a inpatient care unit with PT, OT, speech for one more month before transitioning home   He worked as a . 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  He lives alone.    SCALES:    EPWORTH SLEEPINESS SCALE          No data to display                  INSOMNIA SEVERITY INDEX (MARIAELENA)          10/30/2024    12:29 PM   Insomnia Severity Index (MARIAELENA)   Difficulty falling asleep 1    Difficulty staying asleep 1    Problems waking up too early 0    How SATISFIED/DISSATISFIED are you with your CURRENT sleep pattern? 1    How NOTICEABLE to others do you think your sleep problem is in terms of impairing the quality of your life? 1    How WORRIED/DISTRESSED are you about your current sleep problem? 0    To what extent do you consider your sleep problem to INTERFERE with your daily functioning (e.g. daytime fatigue, mood, ability to function at work/daily chores, concentration, memory, mood, etc.) CURRENTLY? 1    MARIAELENA Total Score 5        Patient-reported       Guidelines for Scoring/Interpretation:  Total score categories:  0-7 = No clinically significant insomnia   8-14 = Subthreshold insomnia   15-21 = Clinical insomnia (moderate severity)  22-28 = Clinical insomnia (severe)  Used via courtesy of www.[employer].va.gov with permission from Sage Moreno PhD., [facility]      STOP BANG         10/30/2024    12:00 PM   STOP BANG Questionnaire (  2008, the American Society of Anesthesiologists, Inc. [employer]   Neck Cir (cm) Clinic: 45 cm   B/P Clinic: 100/63   BMI  "Clinic: 30.54         GAD7         No data to display                  CAGE-AID         No data to display                CAGE-AID reprinted with permission from the [place] Medical Journal, JENIFER Anderson. and SERGEI Benton, \"Conjoint screening questionnaires for alcohol and drug abuse\" [place] Medical Journal 94: 135-140, 1995.      PATIENT HEALTH QUESTIONNAIRE-9 (PHQ - 9)         No data to display                Developed by Bonnie Espinosa, Anita Christopher, Michael Navarro and colleagues, with an educational carolina from [employer] No permission required to reproduce, translate, display or distribute.        Allergies:    No Known Allergies    Medications:    Current Outpatient Medications   Medication Sig Dispense Refill    acetaminophen (TYLENOL) 500 MG tablet Take 1,000 mg by mouth 3 times daily.      apixaban ANTICOAGULANT (ELIQUIS) 5 MG tablet Take 1 tablet (5 mg) by mouth 2 times daily      atorvastatin (LIPITOR) 40 MG tablet Take 1 tablet (40 mg) by mouth every evening      carvedilol (COREG) 12.5 MG tablet Take 1 tablet (12.5 mg) by mouth 2 times daily (with meals) HOLD If sbp <110 or HR <60      citalopram (CELEXA) 10 MG tablet Take 1 tablet (10 mg) by mouth daily      empagliflozin (JARDIANCE) 10 MG TABS tablet Take 1 tablet (10 mg) by mouth daily      furosemide (LASIX) 40 MG tablet Take 1 tablet (40 mg) by mouth daily Hold if sbp <100      gabapentin (NEURONTIN) 100 MG capsule Take 1 capsule (100 mg) by mouth at bedtime      levETIRAcetam (KEPPRA) 750 MG tablet Take 2 tablets (1,500 mg) by mouth 2 times daily      sacubitril-valsartan (ENTRESTO) 49-51 MG per tablet Take 1 tablet by mouth 2 times daily      senna-docusate (SENOKOT-S/PERICOLACE) 8.6-50 MG tablet Take 1 tablet by mouth 2 times daily      spironolactone (ALDACTONE) 25 MG tablet Take 1 tablet (25 mg) by mouth daily HOLD if sbp <115         Problem List:  Patient Active Problem List    Diagnosis Date Noted    Severe left ventricular " "systolic dysfunction (LVSD) 08/10/2024     Priority: Medium    LV (left ventricular) mural thrombus 08/10/2024     Priority: Medium    Occlusion and stenosis of left vertebral artery 08/10/2024     Priority: Medium    History of left watershed infarction 08/10/2024     Priority: Medium    Expressive aphasia syndrome 08/10/2024     Priority: Medium    Elevated troponin 06/12/2024     Priority: Medium    Visual field cut 06/12/2024     Priority: Medium    H/O Lt MCA embolic infarction 06/12/2024     Priority: Medium        Past Medical/Surgical History:  No past medical history on file.  No past surgical history on file.    Social History:  Social History     Socioeconomic History    Marital status: Single     Spouse name: Not on file    Number of children: Not on file    Years of education: Not on file    Highest education level: Not on file   Occupational History    Not on file   Tobacco Use    Smoking status: Never    Smokeless tobacco: Never   Substance and Sexual Activity    Alcohol use: Not on file    Drug use: Not on file    Sexual activity: Not on file   Other Topics Concern    Not on file   Social History Narrative    Not on file     Social Drivers of Health     Financial Resource Strain: Not on file   Food Insecurity: Not on file   Transportation Needs: Not on file   Physical Activity: Not on file   Stress: Not on file   Social Connections: Not on file   Interpersonal Safety: Not on file   Housing Stability: Not on file       Family History:  No family history on file.    Review of Systems:  A complete review of systems reviewed by me is negative with the exeption of what has been mentioned in the history of present illness.        Physical Examination:  Vitals: /63   Pulse 76   Ht 1.803 m (5' 11\")   Wt 99.3 kg (219 lb)   SpO2 99%   BMI 30.54 kg/m    BMI= Body mass index is 30.54 kg/m .    Neck Cir (cm): 45 cm      GENERAL APPEARANCE: healthy, alert, no distress, and cooperative  EYES: Eyes " "grossly normal to inspection, PERRL, conjunctivae and sclerae normal, and lids and lashes normal  HENT: oropharynx crowded, tongue base enlarged, and tongue and soft palate approximate  NECK: no adenopathy, no asymmetry, masses, or scars, thyroid normal to palpation, and trachea midline and normal to palpation  RESP: lungs clear to auscultation - no rales, rhonchi or wheezes  CV: regular rates and rhythm, no murmur, click or rub, and no irregular beats  LYMPHATICS: no cervical adenopathy  MS: right lower leg swollen compared to left, minimal pitting  NEURO: Normal strength and tone, mentation intact, and word finding difficulty  Mallampati Class: IV.  Tonsillar Stage: 0  surgically removed.         Data: All pertinent previous laboratory data reviewed     Recent Labs   Lab Test 07/17/24  0547 07/11/24  0814    142   POTASSIUM 3.1* 3.8   CHLORIDE 104 105   CO2 26 25   ANIONGAP 14 12   * 94   BUN 16.0 17.0   CR 0.82 0.85   PARISH 10.7* 10.1       Recent Labs   Lab Test 07/17/24  0547   WBC 4.2   RBC 5.43   HGB 16.2   HCT 50.8   MCV 94   MCH 29.8   MCHC 31.9   RDW 12.3          No results for input(s): \"PROTTOTAL\", \"ALBUMIN\", \"BILITOTAL\", \"ALKPHOS\", \"AST\", \"ALT\", \"BILIDIRECT\" in the last 32308 hours.    No results found for: \"TSH\"    No results found for: \"UAMP\", \"UBARB\", \"BENZODIAZEUR\", \"UCANN\", \"UCOC\", \"OPIT\", \"UPCP\"    No results found for: \"IRONSAT\", \"HP64006\", \"EVAN\"    No results found for: \"PH\", \"PHARTERIAL\", \"PO2\", \"MA3PGORMCGK\", \"SAT\", \"PCO2\", \"HCO3\", \"BASEEXCESS\", \"LUCHO\", \"BEB\"    @LABRCNTIPR(phv:4,pco2v:4,po2v:4,hco3v:4,jin:4,o2per:4)@    Echocardiology: No results found for this or any previous visit (from the past 4320 hours).    Chest x-ray: No results found for this or any previous visit from the past 365 days.      Chest CT: No results found for this or any previous visit from the past 365 days.      PFT: Most Recent [facility] Pulmonary Function Testing    No results found for: " "\"20001\"        Bennett Ezra Goltz, PA-C, [facility] 10/29/2024          "

## 2024-10-30 ENCOUNTER — OFFICE VISIT (OUTPATIENT)
Dept: SLEEP MEDICINE | Facility: CLINIC | Age: 64
End: 2024-10-30
Attending: NURSE PRACTITIONER
Payer: COMMERCIAL

## 2024-10-30 VITALS
WEIGHT: 219 LBS | DIASTOLIC BLOOD PRESSURE: 63 MMHG | HEIGHT: 71 IN | SYSTOLIC BLOOD PRESSURE: 100 MMHG | BODY MASS INDEX: 30.66 KG/M2 | OXYGEN SATURATION: 99 % | HEART RATE: 76 BPM

## 2024-10-30 DIAGNOSIS — I63.9 ACUTE CVA (CEREBROVASCULAR ACCIDENT) (H): Primary | ICD-10-CM

## 2024-10-30 DIAGNOSIS — G47.23 IRREGULAR SLEEP-WAKE RHYTHM: ICD-10-CM

## 2024-10-30 PROCEDURE — 99204 OFFICE O/P NEW MOD 45 MIN: CPT | Performed by: PHYSICIAN ASSISTANT

## 2024-10-30 RX ORDER — ZOLPIDEM TARTRATE 5 MG/1
TABLET ORAL
Qty: 1 TABLET | Refills: 0 | Status: SHIPPED | OUTPATIENT
Start: 2024-10-30

## 2024-10-30 NOTE — NURSING NOTE
"Chief Complaint   Patient presents with    Sleep Problem     Recent stroke       Initial /63   Pulse 76   Ht 1.803 m (5' 11\")   Wt 99.3 kg (219 lb)   SpO2 99%   BMI 30.54 kg/m   Estimated body mass index is 30.54 kg/m  as calculated from the following:    Height as of this encounter: 1.803 m (5' 11\").    Weight as of this encounter: 99.3 kg (219 lb).    Medication Reconciliation: complete  ESS   Neck circumference: 45 centimeters.  Maria Esther Chaidez MA   "

## 2024-10-30 NOTE — PATIENT INSTRUCTIONS
"          MY TREATMENT INFORMATION FOR SLEEP APNEA-  Cirilo Gardner    DOCTOR : Bennett Goltz, PA-C    Am I having a sleep study at a sleep center?  --->Due to normal delays, you will be contacted within 2-4 weeks to schedule    Am I having a home sleep study?  --->Watch the video for the device you are using:    -/drop off device-   https://www.miradio.fm.com/watch?v=yGGFBdELGhk    Frequently asked questions:  1. What is Obstructive Sleep Apnea (YENNIFER)? YENNIFER is the most common type of sleep apnea. Apnea means, \"without breath.\"  Apnea is most often caused by narrowing or collapse of the upper airway as muscles relax during sleep.   Almost everyone has occasional apneas. Most people with sleep apnea have had brief interruptions at night frequently for many years.  The severity of sleep apnea is related to how frequent and severe the events are.   2. What are the consequences of YENNIFER? Symptoms include: feeling sleepy during the day, snoring loudly, gasping or stopping of breathing, trouble sleeping, and occasionally morning headaches or heartburn at night.  Sleepiness can be serious and even increase the risk of falling asleep while driving. Other health consequences may include development of high blood pressure and other cardiovascular disease in persons who are susceptible. Untreated YENNIFER  can contribute to heart disease, stroke and diabetes.   3. What are the treatment options? In most situations, sleep apnea is a lifelong disease that must be managed with daily therapy. Medications are not effective for sleep apnea and surgery is generally not considered until other therapies have been tried. Your treatment is your choice . Continuous Positive Airway (CPAP) works right away and is the therapy that is effective in nearly everyone. An oral device to hold your jaw forward is usually the next most reliable option. Other options include postioning devices (to keep you off your back), weight loss, and surgery " including a tongue pacing device. There is more detail about some of these options below.  4. Are my sleep studies covered by insurance? Although we will request verification of coverage, we advise you also check in advance of the study to ensure there is coverage.    Important tips for those choosing CPAP and similar devices  REMEMBER-IF YOU RECEIVE A CALL FROM  [phone]-->IT IS TO SETUP A DEVICE  For new devices, sign up for device ROBLES to monitor your device for your followup visits  We encourage you to utilize the [employer] robles or website ( https://[employer]/ ) to monitor your therapy progress and share the data with your healthcare team when you discuss your sleep apnea.                                                    Know your equipment:  CPAP is continuous positive airway pressure that prevents obstructive sleep apnea by keeping the throat from collapsing while you are sleeping. In most cases, the device is  smart  and can slowly self-adjusts if your throat collapses and keeps a record every day of how well you are treated-this information is available to you and your care team.  BPAP is bilevel positive airway pressure that keeps your throat open and also assists each breath with a pressure boost to maintain adequate breathing.  Special kinds of BPAP are used in patients who have inadequate breathing from lung or heart disease. In most cases, the device is  smart  and can slowly self-adjusts to assist breathing. Like CPAP, the device keeps a record of how well you are treated.  Your mask is your connection to the device. You get to choose what feels most comfortable and the staff will help to make sure if fits. Here: are some examples of the different masks that are available: Magnetic mask aids may assist with use but there are safety issues that should be addressed when considering with magnets* ( see end of discussion).       Key points to remember on your journey with sleep  apnea:  Sleep study.  PAP devices often need to be adjusted during a sleep study to show that they are effective and adjusted right.  Good tips to remember: Try wearing just the mask during a quiet time during the day so your body adapts to wearing it. A humidifier is recommended for comfort in most cases to prevent drying of your nose and throat. Allergy medication from your provider may help you if you are having nasal congestion.  Getting settled-in. It takes more than one night for most of us to get used to wearing a mask. Try wearing just the mask during a quiet time during the day so your body adapts to wearing it. A humidifier is recommended for comfort in most cases. Our team will work with you carefully on the first day and will be in contact within 4 days and again at 2 and 4 weeks for advice and remote device adjustments. Your therapy is evaluated by the device each day.   Use it every night. The more you are able to sleep naturally for 7-8 hours, the more likely you will have good sleep and to prevent health risks or symptoms from sleep apnea. Even if you use it 4 hours it helps. Occasionally all of us are unable to use a medical therapy, in sleep apnea, it is not dangerous to miss one night.   Communicate. Call our skilled team on the number provided on the first day if your visit for problems that make it difficult to wear the device. Over 2 out of 3 patients can learn to wear the device long-term with help from our team. Remember to call our team or your sleep providers if you are unable to wear the device as we may have other solutions for those who cannot adapt to mask CPAP therapy. It is recommended that you sleep your sleep provider within the first 3 months and yearly after that if you are not having problems.   Use it for your health. We encourage use of CPAP masks during daytime quiet periods to allow your face and brain to adapt to the sensation of CPAP so that it will be a more natural  sensation to awaken to at night or during naps. This can be very useful during the first few weeks or months of adapting to CPAP though it does not help medically to wear CPAP during wakefulness and  should not be used as a strategy just to meet guidelines.  Take care of your equipment. Make sure you clean your mask and tubing using directions every day and that your filter and mask are replaced as recommended or if they are not working.     *Masks with magnets:  Updated Contraindications  Masks with magnetic components are contraindicated for use by patients where they, or anyone in close physical contact while using the mask, have the following:   Active medical implants that interact with magnets (i.e., pacemakers, implantable cardioverter defibrillators (ICD), neurostimulators, cerebrospinal fluid (CSF) shunts, insulin/infusion pumps)   Metallic implants/objects containing ferromagnetic material (i.e., aneurysm clips/flow disruption devices, embolic coils, stents, valves, electrodes, implants to restore hearing or balance with implanted magnets, ocular implants, metallic splinters in the eye)  Updated Warning  Keep the mask magnets at a safe distance of at least 6 inches (150 mm) away from implants or medical devices that may be adversely affected by magnetic interference. This warning applies to you or anyone in close physical contact with your mask. The magnets are in the frame and lower headgear clips, with a magnetic field strength of up to 400mT. When worn, they connect to secure the mask but may inadvertently detach while asleep.  Implants/medical devices, including those listed within contraindications, may be adversely affected if they change function under external magnetic fields or contain ferromagnetic materials that attract/repel to magnetic fields (some metallic implants, e.g., contact lenses with metal, dental implants, metallic cranial plates, screws, peyman hole covers, and bone substitute  devices). Consult your physician and  of your implant / other medical device for information on the potential adverse effects of magnetic fields.    BESIDES CPAP, WHAT OTHER THERAPIES ARE THERE?    Positioning Device  Positioning devices are generally used when sleep apnea is mild and only occurs on your back.This example shows a pillow that straps around the waist. It may be appropriate for those whose sleep study shows milder sleep apnea that occurs primarily when lying flat on one's back. Preliminary studies have shown benefit but effectiveness at home may need to be verified by a home sleep test. These devices are generally not covered by medical insurance.  Examples of devices that maintain sleeping on the back to prevent snoring and mild sleep apnea.    Belt type body positioner  [email or web address] [place]/    Electronic reminder  http://nightshifttherapy.com/            Oral Appliance  What is oral appliance therapy?  An oral appliance device fits on your teeth at night like a retainer used after having braces. The device is made by a specialized dentist and requires several visits over 1-2 months before a manufactured device is made to fit your teeth and is adjusted to prevent your sleep apnea. Once an oral device is working properly, snoring should be improved. A home sleep test may be recommended at that time if to determine whether the sleep apnea is adequately treated.       Some things to remember:  -Oral devices are often, but not always, covered by your medical insurance. Be sure to check with your insurance provider.   -If you are referred for oral therapy, you will be given a list of specialized dentists to consider or you may choose to visit the Web site of the American Academy of Dental Sleep Medicine  -Oral devices are less likely to work if you have severe sleep apnea or are extremely overweight.     More detailed information  An oral appliance is a small acrylic device that fits over the  upper and lower teeth  (similar to a retainer or a mouth guard). This device slightly moves jaw forward, which moves the base of the tongue forward, opens the airway, improves breathing for effective treat snoring and obstructive sleep apnea in perhaps 7 out of 10 people .  The best working devices are custom-made by a dental device  after a mold is made of the teeth 1, 2, 3.  When is an oral appliance indicated?  Oral appliance therapy is recommended as a first-line treatment for patients with primary snoring, mild sleep apnea, and for patients with moderate sleep apnea who prefer appliance therapy to use of CPAP4, 5. Severity of sleep apnea is determined by sleep testing and is based on the number of respiratory events per hour of sleep.   How successful is oral appliance therapy?  The success rate of oral appliance therapy in patients with mild sleep apnea is 75-80% while in patients with moderate sleep apnea it is 50-70%. The chance of success in patients with severe sleep apnea is 40-50%. The research also shows that oral appliances have a beneficial effect on the cardiovascular health of YENNIFER patients at the same magnitude as CPAP therapy7.  Oral appliances should be a second-line treatment in cases of severe sleep apnea, but if not completely successful then a combination therapy utilizing CPAP plus oral appliance therapy may be effective. Oral appliances tend to be effective in a broad range of patients although studies show that the patients who have the highest success are females, younger patients, those with milder disease, and less severe obesity. 3, 6.   Finding a dentist that practices dental sleep medicine  Specific training is available through the American Academy of Dental Sleep Medicine for dentists interested in working in the field of sleep. To find a dentist who is educated in the field of sleep and the use of oral appliances, near you, visit the Web site of the American Academy of  Dental Sleep Medicine.    References  1. Jimena et al. Objectively measured vs self-reported compliance during oral appliance therapy for sleep-disordered breathing. Chest 2013; 144(5): 7297-3087.  2. Lisette et al. Objective measurement of compliance during oral appliance therapy for sleep-disordered breathing. Thorax 2013; 68(1): 91-96.  3. Thai, et al. Mandibular advancement devices in 620 men and women with YENNIFER and snoring: tolerability and predictors of treatment success. Chest 2004; 125: 1808-9560.  4. Bonifacio et al. Oral appliances for snoring and YENNIFER: a review. Sleep 2006; 29: 244-262.  5. Fabiola et al. Oral appliance treatment for YENNIFER: an update. J Clin Sleep Med 2014; 10(2): 215-227.  6. Jovanni et al. Predictors of OSAH treatment outcome. J Dent Res 2007; 86: 6398-9045.      Weight Loss:   Your Body mass index is 30.54 kg/m .    Being overweight does not necessarily mean you will have health consequences.  Those who have BMI over 35 or over 27 with existing medical conditions carries greater risk.   Weight loss decreases severity of sleep apnea in most people with obesity. For those with mild obesity who have developed snoring with weight gain, even 15-30 pound weight loss can improve and occasionally milder eliminate sleep apnea.  Structured and life-long dietary and health habits are necessary to lose weight and keep healthier weight levels.     The Comprehensive Weight loss program offers all aspects of weight loss strategies including two Non-Surgical Weight Loss Programs: Medical Weight Management and our 24 Week Healthy Lifestyle Program:    Medical Weight Management: You will meet with a Medical Weight Management Provider, as well as a Registered Dietician. The program may include medication therapy, dietary education, recommended exercise and physical therapy programs, monthly support group meetings, and possible psychological counseling. Follow up visits with the provider or  dietician are scheduled based on your progress and needs.    24 Week Healthy Lifestyle Program: This unique program is designed to give you the support of weekly appointments and activities thru a 24-week period. It may include all of the components of the basic program (above), with the addition of 11 individual Health  Visits, 24-week access to the [employer] website for over 700 online classes, and monthly support group meetings. This program has an out-of-pocket expense of $499 to cover the items that can not be billed to insurance (health coaches and [employer] access), and is non-refundable/non-transferable (you may be able to use a Health Savings Account; ask your HSA provider). There may be an optional meal replacement plan prescribed as well.   Surgical management achieves meaningful long-term weight loss and improvement in health risks in most patients with more severe obesity.      Sleep Apnea Surgery:    Surgery for obstructive sleep apnea is considered generally only when other therapies fail to work. Surgery may be discussed with you if you are having a difficult time tolerating CPAP and or when there is an abnormal structure that requires surgical correction.  Nose and throat surgeries often enlarge the airway to prevent collapse.  Most of these surgeries create pain for 1-2 weeks and up to half of the most common surgeries are not effective throughout life.  You should carefully discuss the benefits and drawbacks to surgery with your sleep provider and surgeon to determine if it is the best solution for you.   More information  Surgery for YENNIFER is directed at areas that are responsible for narrowing or complete obstruction of the airway during sleep.  There are a wide range of procedures available to enlarge and/or stabilize the airway to prevent blockage of breathing in the three major areas where it can occur: the palate, tongue, and nasal regions.  Successful surgical treatment depends on the  accurate identification of the factors responsible for obstructive sleep apnea in each person.  A personalized approach is required because there is no single treatment that works well for everyone.  Because of anatomic variation, consultation with an examination by a sleep surgeon is a critical first step in determining what surgical options are best for each patient.  In some cases, examination during sedation may be recommended in order to guide the selection of procedures.  Patients will be counseled about risks and benefits as well as the typical recovery course after surgery. Surgery is typically not a cure for a person s YENNIFER.  However, surgery will often significantly improve one s YENNIFER severity (termed  success rate ).  Even in the absence of a cure, surgery will decrease the cardiovascular risk associated with OSA7; improve overall quality of life8 (sleepiness, functionality, sleep quality, etc).      Palate Procedures:  Patients with YENNIFER often have narrowing of their airway in the region of their tonsils and uvula.  The goals of palate procedures are to widen the airway in this region as well as to help the tissues resist collapse.  Modern palate procedure techniques focus on tissue conservation and soft tissue rearrangement, rather than tissue removal.  Often the uvula is preserved in this procedure. Residual sleep apnea is common in patient after pharyngoplasty with an average reduction in sleep apnea events of 33%2.      Tongue Procedures:  ExamWhile patients are awake, the muscles that surround the throat are active and keep this region open for breathing. These muscles relax during sleep, allowing the tongue and other structures to collapse and block breathing.  There are several different tongue procedures available.  Selection of a tongue base procedure depends on characteristics seen on physical exam.  Generally, procedures are aimed at removing bulky tissues in this area or preventing the back of  the tongue from falling back during sleep.  Success rates for tongue surgery range from 50-62%3.    Hypoglossal Nerve Stimulation:  Hypoglossal nerve stimulation has recently received approval from the [place] Food and Drug Administration for the treatment of obstructive sleep apnea.  This is based on research showing that the system was safe and effective in treating sleep apnea6.  Results showed that the median AHI score decreased 68%, from 29.3 to 9.0. This therapy uses an implant system that senses breathing patterns and delivers mild stimulation to airway muscles, which keeps the airway open during sleep.  The system consists of three fully implanted components: a small generator (similar in size to a pacemaker), a breathing sensor, and a stimulation lead.  Using a small handheld remote, a patient turns the therapy on before bed and off upon awakening.    Candidates for this device must be greater than 18 years of age, have moderate to severe obstructive sleep apnea with less than 25% central events  (AHI between 15-65), BMI less than 35, have tried CPAP/oral appliance for at least 8 weeks without success, and have appropriate upper airway anatomy (determined by a sleep endoscopy performed by Dr. Esteban Kirby or Dr. Miguel Parrish).    Nasal Procedures:  Nasal obstruction can interfere with nasal breathing during the day and night.  Studies have shown that relief of nasal obstruction can improve the ability of some patients to tolerate positive airway pressure therapy for obstructive sleep apnea1.  Treatment options include medications such as nasal saline, topical corticosteroid and antihistamine sprays, and oral medications such as antihistamines or decongestants. Non-surgical treatments can include external nasal dilators for selected patients. If these are not successful by themselves, surgery can improve the nasal airway either alone or in combination with these other options.        Combination  Procedures:  Combination of surgical procedures and other treatments may be recommended, particularly if patients have more than one area of narrowing or persistent positional disease.  The success rate of combination surgery ranges from 66-80%2,3.    References  Shola CRAIN. The Role of the Nose in Snoring and Obstructive Sleep Apnoea: An Update.  Eur Arch Otorhinolaryngol. 2011; 268: 1365-73.   Eliecer SM; Shahab JA; Nithya JR; Pallanch JF; Basil MB; Kate SG; Sandeep LOUIS. Surgical modifications of the upper airway for obstructive sleep apnea in adults: a systematic review and meta-analysis. SLEEP 2010;33(10):0407-2746. Kasia THOMPSON. Hypopharyngeal surgery in obstructive sleep apnea: an evidence-based medicine review.  Arch Otolaryngol Head Neck Surg. 2006 Feb;132(2):206-13.  Bridger YH1, Adam Y, Dada NIKO. The efficacy of anatomically based multilevel surgery for obstructive sleep apnea. Otolaryngol Head Neck Surg. 2003 Oct;129(4):327-35.  Kezirian E, Goldberg A. Hypopharyngeal Surgery in Obstructive Sleep Apnea: An Evidence-Based Medicine Review. Arch Otolaryngol Head Neck Surg. 2006 Feb;132(2):206-13.  Tessie PJ et al. Upper-Airway Stimulation for Obstructive Sleep Apnea.  N Engl J Med. 2014 Jan 9;370(2):139-49.  Jolene Y et al. Increased Incidence of Cardiovascular Disease in Middle-aged Men with Obstructive Sleep Apnea. Am J Respir Crit Care Med; 2002 166: 159-165  Mcclain EM et al. Studying Life Effects and Effectiveness of Palatopharyngoplasty (SLEEP) study: Subjective Outcomes of Isolated Uvulopalatopharyngoplasty. Otolaryngol Head Neck Surg. 2011; 144: 623-631.        WHAT IF I ONLY HAVE SNORING?    Mandibular advancement devices, lateral sleep positioning, long-term weight loss and treatment of nasal allergies have been shown to improve snoring.  Exercising tongue muscles with a game (https://apps[employer]/us/robles/soundly-reduce-snoring/yf3007987932) or stimulating the tongue during the day with a device  (https://doi.org/10.3390/mfb28147304) have improved snoring in some individuals.  https://www.[employer].com/  https://www.sleepfoundation.org/best-anti-snoring-mouthpieces-and-mouthguards    Remember to Drive Safe... Drive Alive     Sleep health profoundly affects your health, mood, and your safety.  Thirty three percent of the population (one in three of us) is not getting enough sleep and many have a sleep disorder. Not getting enough sleep or having an untreated / undertreated sleep condition may make us sleepy without even knowing it. In fact, our driving could be dramatically impaired due to our sleep health. As your provider, here are some things I would like you to know about driving:     Here are some warning signs for impairment and dangerous drowsy driving:              -Having been awake more than 16 hours               -Looking tired               -Eyelid drooping              -Head nodding (it could be too late at this point)              -Driving for more than 30 minutes     Some things you could do to make the driving safer if you are experiencing some drowsiness:              -Stop driving and rest              -Call for transportation              -Make sure your sleep disorder is adequately treated     Some things that have been shown NOT to work when experiencing drowsiness while driving:              -Turning on the radio              -Opening windows              -Eating any  distracting  /  entertaining  foods (e.g., sunflower seeds, candy, or any other)              -Talking on the phone      Your decision may not only impact your life, but also the life of others. Please, remember to drive safe for yourself and all of us.

## 2024-11-01 ENCOUNTER — TELEPHONE (OUTPATIENT)
Dept: CARDIOLOGY | Facility: CLINIC | Age: 64
End: 2024-11-01
Payer: COMMERCIAL

## 2024-11-01 NOTE — TELEPHONE ENCOUNTER
[facility] message follow up call made to patient to convey this message. He agreed with this plan and will follow up on 12/5.     Heather MEDINA, RN  12:20 PM 11/1/2024  Nurse line: WILLIE 8am-4p [phone]             Patrick Kerr!      Cl Mejias NP spoke with Dr. Sung and reviewed your history, and is recommending you continue on the Eliquis for a 6 month course. He said you can revisit the Eliquis at your visit with Dr. Sung in December and consider stopping at that point as long as you are willing to continue the other medications for your Cardiomyopathy.     Let us know if any questions!      SALLIE Ackerman   Nurse for Cl Mejias NP

## 2024-12-05 ENCOUNTER — OFFICE VISIT (OUTPATIENT)
Dept: CARDIOLOGY | Facility: CLINIC | Age: 64
End: 2024-12-05
Attending: NURSE PRACTITIONER
Payer: COMMERCIAL

## 2024-12-05 VITALS
HEIGHT: 71 IN | SYSTOLIC BLOOD PRESSURE: 136 MMHG | OXYGEN SATURATION: 97 % | DIASTOLIC BLOOD PRESSURE: 70 MMHG | WEIGHT: 218.8 LBS | HEART RATE: 68 BPM | BODY MASS INDEX: 30.63 KG/M2

## 2024-12-05 DIAGNOSIS — I50.23 ACUTE ON CHRONIC SYSTOLIC HEART FAILURE (H): ICD-10-CM

## 2024-12-05 DIAGNOSIS — I51.3 LV (LEFT VENTRICULAR) MURAL THROMBUS: ICD-10-CM

## 2024-12-05 DIAGNOSIS — I63.9 ACUTE CVA (CEREBROVASCULAR ACCIDENT) (H): ICD-10-CM

## 2024-12-05 DIAGNOSIS — I42.9 CARDIOMYOPATHY, UNSPECIFIED TYPE (H): ICD-10-CM

## 2024-12-05 RX ORDER — LEVETIRACETAM 500 MG/1
500 TABLET ORAL 2 TIMES DAILY
COMMUNITY
Start: 2024-10-07

## 2024-12-05 NOTE — PROGRESS NOTES
CARDIOLOGY CLINIC CONSULTATION    PRIMARY CARE PHYSICIAN:  Physician No Ref-Primary    HISTORY OF PRESENT ILLNESS:  The patient is a 64-year-old gentleman who presented to the emergency department this past summer with confusion and expressive/receptive aphasia along with visual cut.  He had MRI of the brain which showed evolving infarct in the left temporal/occipital lobes.  He was admitted to the ICU and underwent echocardiogram which demonstrated severe LV systolic dysfunction with an EF of 15 to 20%, severe global hypokinesis and 1.5 x 1.9 cm LV apical thrombus.    We felt his cardiomyopathy was likely nonischemic in nature.  He was sent for CT coronary angiogram but due to inability to follow commands only calcium score was performed.  Calcium score came back 23 which placed him on the 37th percentile.  Given the likelihood of significant obstructive coronary disease and patient's clinical condition, we recommended medical management.  He was initiated on anticoagulation along with guideline directed medical therapy.  He was discharged to rehab.    Fortunately his speech has improved significantly since his stroke.  He had a repeat echocardiogram in September which demonstrated near normal LV function with an EF of 50 to 55%.  The LV apical thrombus also resolved.  He denies cardiopulmonary symptoms    PAST MEDICAL HISTORY:  History reviewed. No pertinent past medical history.    MEDICATIONS:  Current Outpatient Medications   Medication Sig Dispense Refill    acetaminophen (TYLENOL) 500 MG tablet Take 1,000 mg by mouth 3 times daily.      apixaban ANTICOAGULANT (ELIQUIS) 5 MG tablet Take 1 tablet (5 mg) by mouth 2 times daily      atorvastatin (LIPITOR) 40 MG tablet Take 1 tablet (40 mg) by mouth every evening      carvedilol (COREG) 12.5 MG tablet Take 1 tablet (12.5 mg) by mouth 2 times daily (with meals) HOLD If sbp <110 or HR <60      citalopram (CELEXA) 10 MG tablet Take 1 tablet (10 mg) by mouth daily       empagliflozin (JARDIANCE) 10 MG TABS tablet Take 1 tablet (10 mg) by mouth daily      levETIRAcetam (KEPPRA) 500 MG tablet Take 500 mg by mouth 2 times daily.      sacubitril-valsartan (ENTRESTO) 49-51 MG per tablet Take 1 tablet by mouth 2 times daily      senna-docusate (SENOKOT-S/PERICOLACE) 8.6-50 MG tablet Take 1 tablet by mouth 2 times daily      zolpidem (AMBIEN) 5 MG tablet Take tablet by mouth 15 minutes prior to sleep, for Sleep Study 1 tablet 0     No current facility-administered medications for this visit.       SOCIAL HISTORY:  I have reviewed this patient's social history and updated it with pertinent information if needed. Cirilo Gardner  reports that he has never smoked. He has never used smokeless tobacco. He reports that he does not currently use alcohol. He reports that he does not currently use drugs.    PHYSICAL EXAM:  Pulse:  [68] 68  BP: (136-148)/(70-88) 136/70  SpO2:  [97 %] 97 %  218 lbs 12.8 oz    Constitutional: alert, no distress  Respiratory: Good bilateral air entry  Cardiovascular: Regular rhythm, no murmurs  GI: nondistended  Neuropsychiatric: appropriate affact    ASSESSMENT: Pertinent issues addressed/ reviewed during this cardiology visit  Nonischemic cardiomyopathy, resolving  History of LV apical thrombus, resolved  Prior CVA with expressive aphasia  Obesity    RECOMMENDATIONS:  He is doing quite well from cardiac point of view.  No cardiac symptoms.  Recommend that he continue current guideline directed medical therapy.  Follow-up with an ANDREW in approximately 6 months.    It was a pleasure seeing this patient in clinic today. Please do not hesitate to contact me with any future questions.     Ramiro Sung MD, FACC  Cardiology - [facility]  December 5, 2024    Review of the result(s) of each unique test - Last echocardiogram, BMP, CBC, ECG    Time spent is 40 minutes including chart review, documentation and face-to-face visit.     The level of medical decision making  during this visit was of moderate complexity.    This note was completed in part using dictation via the Dragon voice recognition software. Some word and grammatical errors may occur and must be interpreted in the appropriate clinical context.  If there are any questions pertaining to this issue, please contact me for further clarification.

## 2024-12-05 NOTE — LETTER
12/5/2024    Physician No Ref-Primary  No address on file    RE: Cirilo Gardner       Dear Colleague,     I had the pleasure of seeing Cirilo Gardner in the [facility].  CARDIOLOGY CLINIC CONSULTATION    PRIMARY CARE PHYSICIAN:  Physician No Ref-Primary    HISTORY OF PRESENT ILLNESS:  The patient is a 64-year-old gentleman who presented to the emergency department this past summer with confusion and expressive/receptive aphasia along with visual cut.  He had MRI of the brain which showed evolving infarct in the left temporal/occipital lobes.  He was admitted to the ICU and underwent echocardiogram which demonstrated severe LV systolic dysfunction with an EF of 15 to 20%, severe global hypokinesis and 1.5 x 1.9 cm LV apical thrombus.    We felt his cardiomyopathy was likely nonischemic in nature.  He was sent for CT coronary angiogram but due to inability to follow commands only calcium score was performed.  Calcium score came back 23 which placed him on the 37th percentile.  Given the likelihood of significant obstructive coronary disease and patient's clinical condition, we recommended medical management.  He was initiated on anticoagulation along with guideline directed medical therapy.  He was discharged to rehab.    Fortunately his speech has improved significantly since his stroke.  He had a repeat echocardiogram in September which demonstrated near normal LV function with an EF of 50 to 55%.  The LV apical thrombus also resolved.  He denies cardiopulmonary symptoms    PAST MEDICAL HISTORY:  History reviewed. No pertinent past medical history.    MEDICATIONS:  Current Outpatient Medications   Medication Sig Dispense Refill     acetaminophen (TYLENOL) 500 MG tablet Take 1,000 mg by mouth 3 times daily.       apixaban ANTICOAGULANT (ELIQUIS) 5 MG tablet Take 1 tablet (5 mg) by mouth 2 times daily       atorvastatin (LIPITOR) 40 MG tablet Take 1 tablet (40 mg) by mouth every  evening       carvedilol (COREG) 12.5 MG tablet Take 1 tablet (12.5 mg) by mouth 2 times daily (with meals) HOLD If sbp <110 or HR <60       citalopram (CELEXA) 10 MG tablet Take 1 tablet (10 mg) by mouth daily       empagliflozin (JARDIANCE) 10 MG TABS tablet Take 1 tablet (10 mg) by mouth daily       levETIRAcetam (KEPPRA) 500 MG tablet Take 500 mg by mouth 2 times daily.       sacubitril-valsartan (ENTRESTO) 49-51 MG per tablet Take 1 tablet by mouth 2 times daily       senna-docusate (SENOKOT-S/PERICOLACE) 8.6-50 MG tablet Take 1 tablet by mouth 2 times daily       zolpidem (AMBIEN) 5 MG tablet Take tablet by mouth 15 minutes prior to sleep, for Sleep Study 1 tablet 0     No current facility-administered medications for this visit.       SOCIAL HISTORY:  I have reviewed this patient's social history and updated it with pertinent information if needed. Cirilo Renee Jj  reports that he has never smoked. He has never used smokeless tobacco. He reports that he does not currently use alcohol. He reports that he does not currently use drugs.    PHYSICAL EXAM:  Pulse:  [68] 68  BP: (136-148)/(70-88) 136/70  SpO2:  [97 %] 97 %  218 lbs 12.8 oz    Constitutional: alert, no distress  Respiratory: Good bilateral air entry  Cardiovascular: Regular rhythm, no murmurs  GI: nondistended  Neuropsychiatric: appropriate affact    ASSESSMENT: Pertinent issues addressed/ reviewed during this cardiology visit  Nonischemic cardiomyopathy, resolving  History of LV apical thrombus, resolved  Prior CVA with expressive aphasia  Obesity    RECOMMENDATIONS:  He is doing quite well from cardiac point of view.  No cardiac symptoms.  Recommend that he continue current guideline directed medical therapy.  Follow-up with an ANDREW in approximately 6 months.    It was a pleasure seeing this patient in clinic today. Please do not hesitate to contact me with any future questions.     Ramiro Snug MD, FAC  Cardiology - [facility]  December 5,  2024    Review of the result(s) of each unique test - Last echocardiogram, BMP, CBC, ECG    Time spent is 40 minutes including chart review, documentation and face-to-face visit.     The level of medical decision making during this visit was of moderate complexity.    This note was completed in part using dictation via the Dragon voice recognition software. Some word and grammatical errors may occur and must be interpreted in the appropriate clinical context.  If there are any questions pertaining to this issue, please contact me for further clarification.      Thank you for allowing me to participate in the care of your patient.      Sincerely,     Ramiro Sung MD, MD     [facility]  cc:   Colton Mejias NP  [address] [id number]

## 2025-02-04 ENCOUNTER — THERAPY VISIT (OUTPATIENT)
Dept: SLEEP MEDICINE | Facility: CLINIC | Age: 65
End: 2025-02-04
Payer: COMMERCIAL

## 2025-02-04 DIAGNOSIS — I63.9 ACUTE CVA (CEREBROVASCULAR ACCIDENT) (H): ICD-10-CM

## 2025-02-17 LAB — SLPCOMP: NORMAL

## 2025-05-06 ASSESSMENT — SLEEP AND FATIGUE QUESTIONNAIRES
HOW LIKELY ARE YOU TO NOD OFF OR FALL ASLEEP IN A CAR, WHILE STOPPED FOR A FEW MINUTES IN TRAFFIC: WOULD NEVER DOZE
HOW LIKELY ARE YOU TO NOD OFF OR FALL ASLEEP WHILE LYING DOWN TO REST IN THE AFTERNOON WHEN CIRCUMSTANCES PERMIT: SLIGHT CHANCE OF DOZING
HOW LIKELY ARE YOU TO NOD OFF OR FALL ASLEEP WHILE SITTING INACTIVE IN A PUBLIC PLACE: WOULD NEVER DOZE
HOW LIKELY ARE YOU TO NOD OFF OR FALL ASLEEP WHILE SITTING QUIETLY AFTER LUNCH WITHOUT ALCOHOL: WOULD NEVER DOZE
HOW LIKELY ARE YOU TO NOD OFF OR FALL ASLEEP WHILE WATCHING TV: MODERATE CHANCE OF DOZING
HOW LIKELY ARE YOU TO NOD OFF OR FALL ASLEEP WHEN YOU ARE A PASSENGER IN A CAR FOR AN HOUR WITHOUT A BREAK: WOULD NEVER DOZE
HOW LIKELY ARE YOU TO NOD OFF OR FALL ASLEEP WHILE SITTING AND TALKING TO SOMEONE: WOULD NEVER DOZE
HOW LIKELY ARE YOU TO NOD OFF OR FALL ASLEEP WHILE SITTING AND READING: SLIGHT CHANCE OF DOZING

## 2025-05-06 NOTE — PROGRESS NOTES
Sleep Follow-Up Visit:    Date on this visit: 5/7/2025    Cirilo Gardner comes in today for follow-up of his sleep study done on 2/4/25. Cirilo Gardner was initially seen evaluation of sleep apnea in the setting of stroke (in June).     PSG Results:  Weight: 219 pounds  Sleep Architecture: Sleep fragmentation  The total recording time of the polysomnogram was 450.6 minutes. The total sleep time was 215.5 minutes. Sleep latency was 29.1 minutes. REM latency was 231.0 minutes. Arousal index was 66.3 arousals per hour. Sleep efficiency was 47.8%. Wake after sleep onset was 206.0 minutes. The patient spent 24.6% of total sleep time in Stage N1, 71.7% in Stage N2, 0.0% in Stage N3, and 3.7% in REM. Time in REM supine was 0 minutes.     Respiration: Sleep related hypoxemia seemingly out of proportion for patients mild YENNIFER.  Events ? The polysomnogram revealed a presence of - obstructive, - central, and - mixed apneas resulting in an apnea index of - events per hour. There were 29 obstructive hypopneas and - central hypopneas resulting in an obstructive hypopnea index of 8.1 and central hypopnea index of - events per hour. The combined apnea/hypopnea index was 8.1 events per hour (central apnea/hypopnea index was - events per hour). The REM AHI was 30.0 events per hour. The supine AHI was 23.5 events per hour. The RERA index was 1.9 events per hour.  The RDI was 10.0 events per hour.  Snoring - was reported as mild.  Respiratory rate and pattern - was notable for normal respiratory rate and pattern.  Sustained Sleep Associated Hypoventilation - Transcutaneous carbon dioxide monitoring was not used.  Sleep Associated Hypoxemia - (Greater than 5 minutes O2 sat at or below 88%) was present. Baseline oxygen saturation was 89.7%. Lowest oxygen saturation was 79.0%. Time spent less than or equal to 88% was 121.4 minutes. Time spent less than or equal to 89% was 170.1 minutes.     Movement Activity: Elevated  PLMs  Periodic Limb Activity - There were 163 PLMs during the entire study. The PLM index was 45.4 movements per hour. The PLM Arousal Index was 23.4 per hour.  REM EMG Activity - Excessive transient muscle activity was not present.  Nocturnal Behavior - Abnormal sleep related behaviors were not noted.  Bruxism - None apparent.     Cardiac Summary: Sinus  The average pulse rate was 81.5 bpm. The minimum pulse rate was 71.0 bpm while the maximum pulse rate was 95.0 bpm.      He also reports having had a sinus infection during the test. He also had C. diff. diagnosed after the study.  He denies restless legs. He had been having swelling in his hands and feet based on a clinic note dated 1/24.    Past medical/surgical history, family history, social history, medications and allergies were reviewed.      Problem List:  Patient Active Problem List    Diagnosis Date Noted    Severe left ventricular systolic dysfunction (LVSD) 08/10/2024     Priority: Medium    LV (left ventricular) mural thrombus 08/10/2024     Priority: Medium    Occlusion and stenosis of left vertebral artery 08/10/2024     Priority: Medium    History of left watershed infarction 08/10/2024     Priority: Medium    Expressive aphasia syndrome 08/10/2024     Priority: Medium    Elevated troponin 06/12/2024     Priority: Medium    Visual field cut 06/12/2024     Priority: Medium    H/O Lt MCA embolic infarction 06/12/2024     Priority: Medium        Impression/Plan:    (G47.33) YENNIFER (obstructive sleep apnea)  (primary encounter diagnosis), (G47.34) Nocturnal hypoxemia  Comment: This sleep study showed mild YENNIFER (AHI 8/hr). It was positional, 23/hr supine vs 6/hr lateral. His REM AHI was 30/hr (all obtained with lateral positioning). He had hypoxemia independent of the apnea. He spent 121 minutes below 89% SpO2.   Plan: Comprehensive Sleep Study, Adult Pulmonary Medicine  Referral        In lab titration study with TCM. He declined zolpidem for the  night of the test. His insurance would require a titration PSG to demonstrate residual hypoxemia once the apnea is treated in order to qualify for coverage of oxygen.  I have referred to pulmonary medicine for evaluation of the hypoxemia.  Josue notes he is having an ECHO through his primary in the next month. I will see if they will change the orders to include a bubble study to evaluate the hypoxemia.       He will follow up with me in about 2 week(s) after the study (or as soon as possible after that).     Forty minutes spent with patient, all of which were spent face-to-face counseling, consulting, coordinating plan of care.      Bennett Goltz, PA-C    CC: Darlene Adair NP

## 2025-05-07 ENCOUNTER — OFFICE VISIT (OUTPATIENT)
Dept: SLEEP MEDICINE | Facility: CLINIC | Age: 65
End: 2025-05-07
Payer: COMMERCIAL

## 2025-05-07 VITALS
HEART RATE: 61 BPM | HEIGHT: 71 IN | OXYGEN SATURATION: 97 % | SYSTOLIC BLOOD PRESSURE: 150 MMHG | DIASTOLIC BLOOD PRESSURE: 81 MMHG | BODY MASS INDEX: 30.83 KG/M2 | WEIGHT: 220.2 LBS

## 2025-05-07 DIAGNOSIS — G47.33 OSA (OBSTRUCTIVE SLEEP APNEA): Primary | ICD-10-CM

## 2025-05-07 DIAGNOSIS — G47.34 NOCTURNAL HYPOXEMIA: ICD-10-CM

## 2025-05-07 PROCEDURE — 99215 OFFICE O/P EST HI 40 MIN: CPT | Performed by: PHYSICIAN ASSISTANT

## 2025-05-07 PROCEDURE — 3079F DIAST BP 80-89 MM HG: CPT | Performed by: PHYSICIAN ASSISTANT

## 2025-05-07 PROCEDURE — 1126F AMNT PAIN NOTED NONE PRSNT: CPT | Performed by: PHYSICIAN ASSISTANT

## 2025-05-07 PROCEDURE — G2211 COMPLEX E/M VISIT ADD ON: HCPCS | Performed by: PHYSICIAN ASSISTANT

## 2025-05-07 PROCEDURE — 3077F SYST BP >= 140 MM HG: CPT | Performed by: PHYSICIAN ASSISTANT

## 2025-05-07 NOTE — NURSING NOTE
"Chief Complaint   Patient presents with    Study Results       Initial BP (!) 150/81   Pulse 61   Ht 1.803 m (5' 10.98\")   Wt 99.9 kg (220 lb 3.2 oz)   SpO2 97%   BMI 30.73 kg/m   Estimated body mass index is 30.73 kg/m  as calculated from the following:    Height as of this encounter: 1.803 m (5' 10.98\").    Weight as of this encounter: 99.9 kg (220 lb 3.2 oz).    Medication Reconciliation: complete  ESS: 4  Neck circumference: 45 centimeters.    DME: n/a    Christiano Black, MARIETTA      "

## 2025-05-07 NOTE — LETTER
5/7/2025      Cirilo Gardner  [address] [facility] [address]        Dear Darlene Adair NP,  Cirilo Gardner had hypoxemia during his sleep study. He mentioned he has an upcoming ECHO through [facility]. I was wondering if the order could be changed to include a bubble study to work up the hypoxemia. If you wanted to talk about it, my clinic number is [phone].  Thank you,  Bennett Goltz, PA-C             Sleep Follow-Up Visit:    Date on this visit: 5/7/2025    Cirilo Gardner comes in today for follow-up of his sleep study done on 2/4/25. Cirilo Gardner was initially seen evaluation of sleep apnea in the setting of stroke (in June).     PSG Results:  Weight: 219 pounds  Sleep Architecture: Sleep fragmentation  The total recording time of the polysomnogram was 450.6 minutes. The total sleep time was 215.5 minutes. Sleep latency was 29.1 minutes. REM latency was 231.0 minutes. Arousal index was 66.3 arousals per hour. Sleep efficiency was 47.8%. Wake after sleep onset was 206.0 minutes. The patient spent 24.6% of total sleep time in Stage N1, 71.7% in Stage N2, 0.0% in Stage N3, and 3.7% in REM. Time in REM supine was 0 minutes.     Respiration: Sleep related hypoxemia seemingly out of proportion for patients mild YENNIFER.  Events - The polysomnogram revealed a presence of - obstructive, - central, and - mixed apneas resulting in an apnea index of - events per hour. There were 29 obstructive hypopneas and - central hypopneas resulting in an obstructive hypopnea index of 8.1 and central hypopnea index of - events per hour. The combined apnea/hypopnea index was 8.1 events per hour (central apnea/hypopnea index was - events per hour). The REM AHI was 30.0 events per hour. The supine AHI was 23.5 events per hour. The RERA index was 1.9 events per hour.  The RDI was 10.0 events per hour.  Snoring - was reported as mild.  Respiratory rate and pattern - was notable for normal respiratory  rate and pattern.  Sustained Sleep Associated Hypoventilation - Transcutaneous carbon dioxide monitoring was not used.  Sleep Associated Hypoxemia - (Greater than 5 minutes O2 sat at or below 88%) was present. Baseline oxygen saturation was 89.7%. Lowest oxygen saturation was 79.0%. Time spent less than or equal to 88% was 121.4 minutes. Time spent less than or equal to 89% was 170.1 minutes.     Movement Activity: Elevated PLMs  Periodic Limb Activity - There were 163 PLMs during the entire study. The PLM index was 45.4 movements per hour. The PLM Arousal Index was 23.4 per hour.  REM EMG Activity - Excessive transient muscle activity was not present.  Nocturnal Behavior - Abnormal sleep related behaviors were not noted.  Bruxism - None apparent.     Cardiac Summary: Sinus  The average pulse rate was 81.5 bpm. The minimum pulse rate was 71.0 bpm while the maximum pulse rate was 95.0 bpm.      He also reports having had a sinus infection during the test. He also had C. diff. diagnosed after the study.  He denies restless legs. He had been having swelling in his hands and feet based on a clinic note dated 1/24.    Past medical/surgical history, family history, social history, medications and allergies were reviewed.      Problem List:  Patient Active Problem List    Diagnosis Date Noted     Severe left ventricular systolic dysfunction (LVSD) 08/10/2024     Priority: Medium     LV (left ventricular) mural thrombus 08/10/2024     Priority: Medium     Occlusion and stenosis of left vertebral artery 08/10/2024     Priority: Medium     History of left watershed infarction 08/10/2024     Priority: Medium     Expressive aphasia syndrome 08/10/2024     Priority: Medium     Elevated troponin 06/12/2024     Priority: Medium     Visual field cut 06/12/2024     Priority: Medium     H/O Lt MCA embolic infarction 06/12/2024     Priority: Medium        Impression/Plan:    (G47.33) YENNIFER (obstructive sleep apnea)  (primary encounter  diagnosis), (G47.34) Nocturnal hypoxemia  Comment: This sleep study showed mild YENNIFER (AHI 8/hr). It was positional, 23/hr supine vs 6/hr lateral. His REM AHI was 30/hr (all obtained with lateral positioning). He had hypoxemia independent of the apnea. He spent 121 minutes below 89% SpO2.   Plan: Comprehensive Sleep Study, Adult Pulmonary Medicine  Referral        In lab titration study with TCM. He declined zolpidem for the night of the test. His insurance would require a titration PSG to demonstrate residual hypoxemia once the apnea is treated in order to qualify for coverage of oxygen.  I have referred to pulmonary medicine for evaluation of the hypoxemia.  Josue notes he is having an ECHO through his primary in the next month. I will see if they will change the orders to include a bubble study to evaluate the hypoxemia.       He will follow up with me in about 2 week(s) after the study (or as soon as possible after that).     Forty minutes spent with patient, all of which were spent face-to-face counseling, consulting, coordinating plan of care.      Bennett Goltz, PA-C    CC: Darlene Adair NP         Sincerely,        Bennett Ezra Goltz, PA-C, MAMI    Electronically signed

## 2025-07-01 ENCOUNTER — HOSPITAL ENCOUNTER (INPATIENT)
Facility: CLINIC | Age: 65
LOS: 1 days | Discharge: HOME | End: 2025-07-03
Attending: EMERGENCY MEDICINE | Admitting: INTERNAL MEDICINE
Payer: COMMERCIAL

## 2025-07-01 DIAGNOSIS — Z86.73 HISTORY OF STROKE: Primary | ICD-10-CM

## 2025-07-01 DIAGNOSIS — I16.1 HYPERTENSIVE EMERGENCY: ICD-10-CM

## 2025-07-01 DIAGNOSIS — R79.89 ELEVATED TROPONIN: ICD-10-CM

## 2025-07-01 LAB
BASOPHILS # BLD AUTO: 0.1 10E3/UL (ref 0–0.2)
BASOPHILS NFR BLD AUTO: 1 %
EOSINOPHIL # BLD AUTO: 0.2 10E3/UL (ref 0–0.7)
EOSINOPHIL NFR BLD AUTO: 2 %
ERYTHROCYTE [DISTWIDTH] IN BLOOD BY AUTOMATED COUNT: 12.9 % (ref 10–15)
HCT VFR BLD AUTO: 44.1 % (ref 40–53)
HGB BLD-MCNC: 14.6 G/DL (ref 13.3–17.7)
IMM GRANULOCYTES # BLD: 0 10E3/UL
IMM GRANULOCYTES NFR BLD: 0 %
LYMPHOCYTES # BLD AUTO: 2.2 10E3/UL (ref 0.8–5.3)
LYMPHOCYTES NFR BLD AUTO: 27 %
MCH RBC QN AUTO: 30.6 PG (ref 26.5–33)
MCHC RBC AUTO-ENTMCNC: 33.1 G/DL (ref 31.5–36.5)
MCV RBC AUTO: 93 FL (ref 78–100)
MONOCYTES # BLD AUTO: 1.1 10E3/UL (ref 0–1.3)
MONOCYTES NFR BLD AUTO: 14 %
NEUTROPHILS # BLD AUTO: 4.7 10E3/UL (ref 1.6–8.3)
NEUTROPHILS NFR BLD AUTO: 57 %
NRBC # BLD AUTO: 0 10E3/UL
NRBC BLD AUTO-RTO: 0 /100
PLATELET # BLD AUTO: 442 10E3/UL (ref 150–450)
RBC # BLD AUTO: 4.77 10E6/UL (ref 4.4–5.9)
WBC # BLD AUTO: 8.2 10E3/UL (ref 4–11)

## 2025-07-01 PROCEDURE — 85025 COMPLETE CBC W/AUTO DIFF WBC: CPT | Performed by: EMERGENCY MEDICINE

## 2025-07-01 PROCEDURE — 84484 ASSAY OF TROPONIN QUANT: CPT | Performed by: EMERGENCY MEDICINE

## 2025-07-01 PROCEDURE — 99285 EMERGENCY DEPT VISIT HI MDM: CPT | Mod: 25

## 2025-07-01 PROCEDURE — 93005 ELECTROCARDIOGRAM TRACING: CPT

## 2025-07-01 PROCEDURE — 83036 HEMOGLOBIN GLYCOSYLATED A1C: CPT | Performed by: INTERNAL MEDICINE

## 2025-07-01 PROCEDURE — 84155 ASSAY OF PROTEIN SERUM: CPT | Performed by: EMERGENCY MEDICINE

## 2025-07-01 PROCEDURE — 93005 ELECTROCARDIOGRAM TRACING: CPT | Mod: 76

## 2025-07-01 PROCEDURE — 36415 COLL VENOUS BLD VENIPUNCTURE: CPT | Performed by: EMERGENCY MEDICINE

## 2025-07-01 PROCEDURE — 82040 ASSAY OF SERUM ALBUMIN: CPT | Performed by: EMERGENCY MEDICINE

## 2025-07-01 ASSESSMENT — ACTIVITIES OF DAILY LIVING (ADL): ADLS_ACUITY_SCORE: 59

## 2025-07-01 ASSESSMENT — COLUMBIA-SUICIDE SEVERITY RATING SCALE - C-SSRS
2. HAVE YOU ACTUALLY HAD ANY THOUGHTS OF KILLING YOURSELF IN THE PAST MONTH?: NO
6. HAVE YOU EVER DONE ANYTHING, STARTED TO DO ANYTHING, OR PREPARED TO DO ANYTHING TO END YOUR LIFE?: NO
1. IN THE PAST MONTH, HAVE YOU WISHED YOU WERE DEAD OR WISHED YOU COULD GO TO SLEEP AND NOT WAKE UP?: NO

## 2025-07-02 ENCOUNTER — APPOINTMENT (OUTPATIENT)
Dept: CARDIOLOGY | Facility: CLINIC | Age: 65
DRG: 281 | End: 2025-07-02
Attending: INTERNAL MEDICINE
Payer: COMMERCIAL

## 2025-07-02 PROBLEM — I16.1 HYPERTENSIVE EMERGENCY: Status: ACTIVE | Noted: 2025-07-02

## 2025-07-02 LAB
ALBUMIN SERPL BCG-MCNC: 4.5 G/DL (ref 3.5–5.2)
ALP SERPL-CCNC: 87 U/L (ref 40–150)
ALT SERPL W P-5'-P-CCNC: 14 U/L (ref 0–70)
ANION GAP SERPL CALCULATED.3IONS-SCNC: 14 MMOL/L (ref 7–15)
AST SERPL W P-5'-P-CCNC: 14 U/L (ref 0–45)
ATRIAL RATE - MUSE: 68 BPM
ATRIAL RATE - MUSE: 82 BPM
BILIRUB SERPL-MCNC: 0.7 MG/DL
BUN SERPL-MCNC: 22.3 MG/DL (ref 8–23)
CALCIUM SERPL-MCNC: 9.9 MG/DL (ref 8.8–10.4)
CHLORIDE SERPL-SCNC: 102 MMOL/L (ref 98–107)
CREAT BLD-MCNC: 0.7 MG/DL (ref 0.7–1.2)
CREAT SERPL-MCNC: 0.7 MG/DL (ref 0.67–1.17)
DIASTOLIC BLOOD PRESSURE - MUSE: NORMAL MMHG
DIASTOLIC BLOOD PRESSURE - MUSE: NORMAL MMHG
EGFRCR SERPLBLD CKD-EPI 2021: >60 ML/MIN/1.73M2
EGFRCR SERPLBLD CKD-EPI 2021: >90 ML/MIN/1.73M2
EST. AVERAGE GLUCOSE BLD GHB EST-MCNC: 131 MG/DL
GLUCOSE BLDC GLUCOMTR-MCNC: 105 MG/DL (ref 70–99)
GLUCOSE BLDC GLUCOMTR-MCNC: 162 MG/DL (ref 70–99)
GLUCOSE SERPL-MCNC: 116 MG/DL (ref 70–99)
HBA1C MFR BLD: 6.2 %
HCO3 SERPL-SCNC: 25 MMOL/L (ref 22–29)
HOLD SPECIMEN: NORMAL
HOLD SPECIMEN: NORMAL
INTERPRETATION ECG - MUSE: NORMAL
INTERPRETATION ECG - MUSE: NORMAL
P AXIS - MUSE: 56 DEGREES
P AXIS - MUSE: 65 DEGREES
POTASSIUM SERPL-SCNC: 4.2 MMOL/L (ref 3.4–5.3)
PR INTERVAL - MUSE: 174 MS
PR INTERVAL - MUSE: 258 MS
PROT SERPL-MCNC: 8.7 G/DL (ref 6.4–8.3)
QRS DURATION - MUSE: 94 MS
QRS DURATION - MUSE: 96 MS
QT - MUSE: 382 MS
QT - MUSE: 414 MS
QTC - MUSE: 440 MS
QTC - MUSE: 446 MS
R AXIS - MUSE: -4 DEGREES
R AXIS - MUSE: 5 DEGREES
SODIUM SERPL-SCNC: 141 MMOL/L (ref 135–145)
SYSTOLIC BLOOD PRESSURE - MUSE: NORMAL MMHG
SYSTOLIC BLOOD PRESSURE - MUSE: NORMAL MMHG
T AXIS - MUSE: 64 DEGREES
T AXIS - MUSE: 64 DEGREES
TROPONIN T SERPL HS-MCNC: 22 NG/L
TROPONIN T SERPL HS-MCNC: 31 NG/L
TROPONIN T SERPL HS-MCNC: 32 NG/L
TROPONIN T SERPL HS-MCNC: 40 NG/L
TROPONIN T SERPL HS-MCNC: 9 NG/L
VENTRICULAR RATE- MUSE: 68 BPM
VENTRICULAR RATE- MUSE: 82 BPM

## 2025-07-02 PROCEDURE — 75574 CT ANGIO HRT W/3D IMAGE: CPT | Mod: 26 | Performed by: INTERNAL MEDICINE

## 2025-07-02 PROCEDURE — 250N000013 HC RX MED GY IP 250 OP 250 PS 637: Performed by: INTERNAL MEDICINE

## 2025-07-02 PROCEDURE — 84484 ASSAY OF TROPONIN QUANT: CPT | Performed by: EMERGENCY MEDICINE

## 2025-07-02 PROCEDURE — 36415 COLL VENOUS BLD VENIPUNCTURE: CPT | Performed by: INTERNAL MEDICINE

## 2025-07-02 PROCEDURE — 99223 1ST HOSP IP/OBS HIGH 75: CPT | Performed by: INTERNAL MEDICINE

## 2025-07-02 PROCEDURE — 99223 1ST HOSP IP/OBS HIGH 75: CPT | Mod: 25 | Performed by: INTERNAL MEDICINE

## 2025-07-02 PROCEDURE — 75574 CT ANGIO HRT W/3D IMAGE: CPT

## 2025-07-02 PROCEDURE — 210N000002 HC R&B HEART CARE

## 2025-07-02 PROCEDURE — 250N000011 HC RX IP 250 OP 636: Performed by: INTERNAL MEDICINE

## 2025-07-02 PROCEDURE — 84484 ASSAY OF TROPONIN QUANT: CPT | Performed by: INTERNAL MEDICINE

## 2025-07-02 PROCEDURE — 250N000012 HC RX MED GY IP 250 OP 636 PS 637: Performed by: INTERNAL MEDICINE

## 2025-07-02 PROCEDURE — 82565 ASSAY OF CREATININE: CPT

## 2025-07-02 PROCEDURE — 36415 COLL VENOUS BLD VENIPUNCTURE: CPT | Performed by: EMERGENCY MEDICINE

## 2025-07-02 PROCEDURE — 250N000013 HC RX MED GY IP 250 OP 250 PS 637: Performed by: EMERGENCY MEDICINE

## 2025-07-02 RX ORDER — IOPAMIDOL 755 MG/ML
500 INJECTION, SOLUTION INTRAVASCULAR ONCE
Status: COMPLETED | OUTPATIENT
Start: 2025-07-02 | End: 2025-07-02

## 2025-07-02 RX ORDER — ASPIRIN 81 MG/1
162 TABLET, CHEWABLE ORAL ONCE
Status: COMPLETED | OUTPATIENT
Start: 2025-07-02 | End: 2025-07-02

## 2025-07-02 RX ORDER — ATORVASTATIN CALCIUM 40 MG/1
40 TABLET, FILM COATED ORAL EVERY EVENING
Status: DISCONTINUED | OUTPATIENT
Start: 2025-07-02 | End: 2025-07-03 | Stop reason: HOSPADM

## 2025-07-02 RX ORDER — LIDOCAINE 40 MG/G
CREAM TOPICAL
Status: DISCONTINUED | OUTPATIENT
Start: 2025-07-02 | End: 2025-07-02

## 2025-07-02 RX ORDER — ACETAMINOPHEN 650 MG/1
650 SUPPOSITORY RECTAL EVERY 4 HOURS PRN
Status: DISCONTINUED | OUTPATIENT
Start: 2025-07-02 | End: 2025-07-03 | Stop reason: HOSPADM

## 2025-07-02 RX ORDER — DILTIAZEM HCL 60 MG
120 TABLET ORAL
Status: DISCONTINUED | OUTPATIENT
Start: 2025-07-02 | End: 2025-07-03 | Stop reason: HOSPADM

## 2025-07-02 RX ORDER — LIDOCAINE 40 MG/G
CREAM TOPICAL
Status: DISCONTINUED | OUTPATIENT
Start: 2025-07-02 | End: 2025-07-03 | Stop reason: HOSPADM

## 2025-07-02 RX ORDER — ACETAMINOPHEN 325 MG/1
650 TABLET ORAL EVERY 4 HOURS PRN
Status: DISCONTINUED | OUTPATIENT
Start: 2025-07-02 | End: 2025-07-03 | Stop reason: HOSPADM

## 2025-07-02 RX ORDER — AMOXICILLIN 250 MG
1 CAPSULE ORAL 2 TIMES DAILY PRN
Status: DISCONTINUED | OUTPATIENT
Start: 2025-07-02 | End: 2025-07-03 | Stop reason: HOSPADM

## 2025-07-02 RX ORDER — FLUTICASONE PROPIONATE 50 MCG
1 SPRAY, SUSPENSION (ML) NASAL DAILY
COMMUNITY

## 2025-07-02 RX ORDER — ONDANSETRON 4 MG/1
4 TABLET, ORALLY DISINTEGRATING ORAL EVERY 6 HOURS PRN
Status: DISCONTINUED | OUTPATIENT
Start: 2025-07-02 | End: 2025-07-03 | Stop reason: HOSPADM

## 2025-07-02 RX ORDER — HYDRALAZINE HYDROCHLORIDE 20 MG/ML
10 INJECTION INTRAMUSCULAR; INTRAVENOUS EVERY 4 HOURS PRN
Status: DISCONTINUED | OUTPATIENT
Start: 2025-07-02 | End: 2025-07-03 | Stop reason: HOSPADM

## 2025-07-02 RX ORDER — HYDROCHLOROTHIAZIDE 25 MG/1
25 TABLET ORAL DAILY
Status: DISCONTINUED | OUTPATIENT
Start: 2025-07-02 | End: 2025-07-03

## 2025-07-02 RX ORDER — AMOXICILLIN 250 MG
2 CAPSULE ORAL 2 TIMES DAILY PRN
Status: DISCONTINUED | OUTPATIENT
Start: 2025-07-02 | End: 2025-07-03 | Stop reason: HOSPADM

## 2025-07-02 RX ORDER — ASPIRIN 81 MG/1
81 TABLET, CHEWABLE ORAL DAILY
Status: DISCONTINUED | OUTPATIENT
Start: 2025-07-03 | End: 2025-07-03

## 2025-07-02 RX ORDER — NICOTINE POLACRILEX 4 MG
15-30 LOZENGE BUCCAL
Status: DISCONTINUED | OUTPATIENT
Start: 2025-07-02 | End: 2025-07-03 | Stop reason: HOSPADM

## 2025-07-02 RX ORDER — CALCIUM CARBONATE 500 MG/1
1000 TABLET, CHEWABLE ORAL 4 TIMES DAILY PRN
Status: DISCONTINUED | OUTPATIENT
Start: 2025-07-02 | End: 2025-07-03 | Stop reason: HOSPADM

## 2025-07-02 RX ORDER — CARVEDILOL 12.5 MG/1
12.5 TABLET ORAL 2 TIMES DAILY WITH MEALS
Status: DISCONTINUED | OUTPATIENT
Start: 2025-07-02 | End: 2025-07-03 | Stop reason: HOSPADM

## 2025-07-02 RX ORDER — ONDANSETRON 2 MG/ML
4 INJECTION INTRAMUSCULAR; INTRAVENOUS
Status: DISCONTINUED | OUTPATIENT
Start: 2025-07-02 | End: 2025-07-02

## 2025-07-02 RX ORDER — NITROGLYCERIN 0.4 MG/1
0.4 TABLET SUBLINGUAL
Status: DISCONTINUED | OUTPATIENT
Start: 2025-07-02 | End: 2025-07-03 | Stop reason: HOSPADM

## 2025-07-02 RX ORDER — METOPROLOL TARTRATE 25 MG/1
25-100 TABLET, FILM COATED ORAL
Status: COMPLETED | OUTPATIENT
Start: 2025-07-02 | End: 2025-07-02

## 2025-07-02 RX ORDER — HYDRALAZINE HYDROCHLORIDE 10 MG/1
10 TABLET, FILM COATED ORAL EVERY 4 HOURS PRN
Status: DISCONTINUED | OUTPATIENT
Start: 2025-07-02 | End: 2025-07-03 | Stop reason: HOSPADM

## 2025-07-02 RX ORDER — IVABRADINE 5 MG/1
5-15 TABLET, FILM COATED ORAL
Status: COMPLETED | OUTPATIENT
Start: 2025-07-02 | End: 2025-07-02

## 2025-07-02 RX ORDER — METOPROLOL TARTRATE 1 MG/ML
5-20 INJECTION, SOLUTION INTRAVENOUS
Status: DISCONTINUED | OUTPATIENT
Start: 2025-07-02 | End: 2025-07-03 | Stop reason: HOSPADM

## 2025-07-02 RX ORDER — DILTIAZEM HYDROCHLORIDE 5 MG/ML
10-15 INJECTION INTRAVENOUS
Status: DISCONTINUED | OUTPATIENT
Start: 2025-07-02 | End: 2025-07-03 | Stop reason: HOSPADM

## 2025-07-02 RX ORDER — ONDANSETRON 2 MG/ML
4 INJECTION INTRAMUSCULAR; INTRAVENOUS EVERY 6 HOURS PRN
Status: DISCONTINUED | OUTPATIENT
Start: 2025-07-02 | End: 2025-07-03 | Stop reason: HOSPADM

## 2025-07-02 RX ORDER — DEXTROSE MONOHYDRATE 25 G/50ML
25-50 INJECTION, SOLUTION INTRAVENOUS
Status: DISCONTINUED | OUTPATIENT
Start: 2025-07-02 | End: 2025-07-03 | Stop reason: HOSPADM

## 2025-07-02 RX ADMIN — SACUBITRIL AND VALSARTAN 1 TABLET: 49; 51 TABLET, FILM COATED ORAL at 09:31

## 2025-07-02 RX ADMIN — IVABRADINE 10 MG: 5 TABLET, FILM COATED ORAL at 13:26

## 2025-07-02 RX ADMIN — ASPIRIN 81 MG CHEWABLE TABLET 162 MG: 81 TABLET CHEWABLE at 02:13

## 2025-07-02 RX ADMIN — INSULIN ASPART 1 UNITS: 100 INJECTION, SOLUTION INTRAVENOUS; SUBCUTANEOUS at 22:19

## 2025-07-02 RX ADMIN — SACUBITRIL AND VALSARTAN 1 TABLET: 49; 51 TABLET, FILM COATED ORAL at 21:17

## 2025-07-02 RX ADMIN — IOPAMIDOL 120 ML: 755 INJECTION, SOLUTION INTRAVENOUS at 15:53

## 2025-07-02 RX ADMIN — METOPROLOL TARTRATE 50 MG: 50 TABLET, FILM COATED ORAL at 13:26

## 2025-07-02 RX ADMIN — NITROGLYCERIN 0.4 MG: 0.4 TABLET SUBLINGUAL at 15:31

## 2025-07-02 RX ADMIN — CARVEDILOL 12.5 MG: 12.5 TABLET, FILM COATED ORAL at 18:28

## 2025-07-02 RX ADMIN — APIXABAN 5 MG: 5 TABLET, FILM COATED ORAL at 21:17

## 2025-07-02 RX ADMIN — ATORVASTATIN CALCIUM 40 MG: 40 TABLET, FILM COATED ORAL at 21:18

## 2025-07-02 RX ADMIN — CARVEDILOL 12.5 MG: 12.5 TABLET, FILM COATED ORAL at 09:31

## 2025-07-02 RX ADMIN — HYDROCHLOROTHIAZIDE 25 MG: 25 TABLET ORAL at 12:08

## 2025-07-02 RX ADMIN — APIXABAN 5 MG: 5 TABLET, FILM COATED ORAL at 09:31

## 2025-07-02 ASSESSMENT — ACTIVITIES OF DAILY LIVING (ADL)
ADLS_ACUITY_SCORE: 38
ADLS_ACUITY_SCORE: 59
ADLS_ACUITY_SCORE: 59
ADLS_ACUITY_SCORE: 58
ADLS_ACUITY_SCORE: 59
ADLS_ACUITY_SCORE: 58
ADLS_ACUITY_SCORE: 59
ADLS_ACUITY_SCORE: 38
ADLS_ACUITY_SCORE: 59
ADLS_ACUITY_SCORE: 38
ADLS_ACUITY_SCORE: 59
ADLS_ACUITY_SCORE: 38
ADLS_ACUITY_SCORE: 59
ADLS_ACUITY_SCORE: 59

## 2025-07-02 NOTE — PHARMACY-ADMISSION MEDICATION HISTORY
Pharmacist Admission Medication History    Admission medication history is complete. The information provided in this note is only as accurate as the sources available at the time of the update.    Information Source(s): Patient via in-person    Pertinent Information: None    Changes made to PTA medication list:  Added: Flonase  Deleted: Keppra  Changed: None    Allergies reviewed with patient and updates made in EHR: no    Medication History Completed By: Neelima [facility]ers [facility] 7/2/2025 8:18 AM    PTA Med List   Medication Sig Last Dose/Taking    acetaminophen (TYLENOL) 500 MG tablet Take 1,000 mg by mouth 3 times daily. As needed Taking    apixaban ANTICOAGULANT (ELIQUIS) 5 MG tablet Take 1 tablet (5 mg) by mouth 2 times daily 7/1/2025    atorvastatin (LIPITOR) 40 MG tablet Take 1 tablet (40 mg) by mouth every evening 7/1/2025    carvedilol (COREG) 12.5 MG tablet Take 1 tablet (12.5 mg) by mouth 2 times daily (with meals) HOLD If sbp <110 or HR <60 7/1/2025    empagliflozin (JARDIANCE) 10 MG TABS tablet Take 1 tablet (10 mg) by mouth daily 7/1/2025    fluticasone (FLONASE) 50 MCG/ACT nasal spray Spray 1 spray into both nostrils daily. 7/1/2025    sacubitril-valsartan (ENTRESTO) 49-51 MG per tablet Take 1 tablet by mouth 2 times daily 7/1/2025

## 2025-07-02 NOTE — ED PROVIDER NOTES
Emergency Department Note      History of Present Illness     Chief Complaint  Hypertension          HPI  Cirilo Gardner is a 64 year old male who presents to the emergency room with high blood pressure that he noted today.  He states that he has a remote history of a stroke a year ago and is quite clear that he has no symptoms related to his stroke and no medical symptoms at all today but did check his blood pressure and became concerned.  He states that he has a friend of a friend who decided that if his blood pressure was 165 or higher he would always go to the ER and the patient checked his today and it was over 165 and he became concerned that he may be having another stroke.  He again is clear here that he has had no worsening of his previous symptoms or new symptoms today, however when he does arrive here in the ER his blood pressure is significantly elevated.    He does take Coreg and Entresto for this.        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, high blood pressure          Medications  acetaminophen (TYLENOL) 500 MG tablet  apixaban ANTICOAGULANT (ELIQUIS) 5 MG tablet  atorvastatin (LIPITOR) 40 MG tablet  carvedilol (COREG) 12.5 MG tablet  empagliflozin (JARDIANCE) 10 MG TABS tablet  levETIRAcetam (KEPPRA) 500 MG tablet  sacubitril-valsartan (ENTRESTO) 49-51 MG per tablet        Surgical History   Past Surgical History:   Procedure Laterality Date    TONSILLECTOMY           Physical Exam   Visit Vitals  BP (!) 177/107   Pulse 75   Temp 98.3  F (36.8  C) (Oral)   Resp 18          Vitals: reviewed by me as above  General: Pt seen on hospitals, pleasant, cooperative, and alert to conversation  Eyes: Tracking well, clear conjunctiva BL  ENT: MMM,  midline trachea.   Lungs: No tachypnea, no accessory muscle use. No respiratory distress.  Lungs are clear to auscultation bilaterally  CV: Rate as above, normal S1-S2, no additional heart sounds noted.  Abd: Soft, non tender, no guarding, no rebound. Non distended   MSK: no joint effusion.  No evidence of trauma  Skin: No rash  Neuro: Clear speech and no facial droop-very faint and minimal baseline aphasia.  strength 5/5 bilaterally and able to extend BL arms against gravity on command.  Sensation intact light touch to dorsal and volar aspect of bilateral hands and fingers throughout.  Able to wiggle toes bilaterally on command, sensation intact to light touch to anterior aspect of thighs and shins bilaterally.   Psych: Not RIS, no e/o AH/VH          Diagnostics   Lab Results   Labs Ordered and Resulted from Time of ED Arrival to Time of ED Departure   COMPREHENSIVE METABOLIC PANEL - Abnormal       Result Value    Sodium 141      Potassium 4.2      Carbon Dioxide (CO2) 25      Anion Gap 14      Urea Nitrogen 22.3      Creatinine 0.70      GFR Estimate >90      Calcium 9.9      Chloride 102      Glucose 116 (*)     Alkaline Phosphatase 87      AST 14      ALT 14      Protein Total 8.7 (*)     Albumin 4.5      Bilirubin Total 0.7     TROPONIN T, HIGH SENSITIVITY - Abnormal    Troponin T, High Sensitivity 31 (*)    TROPONIN T, HIGH SENSITIVITY - Abnormal    Troponin T, High Sensitivity 40 (*)    TROPONIN T, HIGH SENSITIVITY - Normal    Troponin T, High Sensitivity 9     CBC WITH PLATELETS AND DIFFERENTIAL    WBC Count 8.2      RBC Count 4.77      Hemoglobin 14.6      Hematocrit 44.1      MCV 93      MCH 30.6      MCHC 33.1      RDW 12.9      Platelet Count 442      % Neutrophils 57      % Lymphocytes 27      % Monocytes 14      % Eosinophils 2      % Basophils 1      % Immature Granulocytes 0      NRBCs per 100 WBC 0      Absolute Neutrophils 4.7      Absolute Lymphocytes 2.2      Absolute Monocytes 1.1       Absolute Eosinophils 0.2      Absolute Basophils 0.1      Absolute Immature Granulocytes 0.0      Absolute NRBCs 0.0     TROPONIN T, HIGH SENSITIVITY       Imaging  No orders to display       EKG   ECG results from 07/01/25   EKG 12 lead     Value    Systolic Blood Pressure     Diastolic Blood Pressure     Ventricular Rate 82    Atrial Rate 82    NC Interval 258    QRS Duration 96        QTc 446    P Axis 65    R AXIS -4    T Axis 64    Interpretation ECG      Sinus rhythm with 1st degree A-V block  Otherwise normal ECG  When compared with ECG of 17-Jun-2024 17:36,  Premature ventricular complexes are no longer Present  NC interval has increased  Incomplete right bundle branch block is no longer Present  Reviewed by Sai ARAGON   EKG 12-lead, tracing only     Value    Systolic Blood Pressure     Diastolic Blood Pressure     Ventricular Rate 68    Atrial Rate 68    NC Interval 174    QRS Duration 94        QTc 440    P Axis 56    R AXIS 5    T Axis 64    Interpretation ECG      Sinus rhythm  Normal ECG  When compared with ECG of 01-Jul-2025 23:11, (unconfirmed)  NC interval has decreased  Reviewed by Sai ARAGON             ED Course      Medications Administered   Medications   hydrALAZINE (APRESOLINE) tablet 10 mg (has no administration in time range)     Or   hydrALAZINE (APRESOLINE) injection 10 mg (has no administration in time range)   aspirin (ASA) chewable tablet 162 mg (162 mg Oral $Given 7/2/25 4927)            Optional/Additional Documentation  None      Medical Decision Making / Diagnosis         MDM  This is a very pleasant 64-year-old male who presents to the emergency room initially with asymptomatic hypertension.  It does sound like his blood pressure was quite elevated as initially was in the 220s, and they reaffirmed that the patient did not have any chest pain or any medical symptoms, and was simply here because he thought that the high blood pressure may indicate another stroke and  he was concerned about this.  Screening labs were done, and unfortunately psych is troponin is actually increasing quite a bit and even his third troponin is still slightly elevated.  I did a repeat EKG and thankfully there is no evidence of ischemia and I reassessed the patient again and he continues to confirm and repeat that there is no chest pain.  With this in mind I did not think it prudent to give heparin as it may look like his troponins are actually flattening between #2 and 3.  However I do think he needs to come into the hospital and be evaluated and monitored until it is very clear that his troponins are indeed flat.  I suspect it is likely related to possible demand ischemia or mild organ damage from the high blood pressure, and cardiology may need to weigh in.  Patient was given an aspirin, and will be monitored very carefully until next inpatient bed is available.  Thankfully his blood pressure has come down to almost normal limits on its own without any interventions and no need for an IV titratable agent was noted.    ICD-10 Codes:    ICD-10-CM    1. Hypertensive emergency  I16.1       2. Elevated troponin  R79.89                         Víctor Gibbs MD  07/02/25 0615

## 2025-07-02 NOTE — PROGRESS NOTES
Brief Provider Note     Briefly met patient prior to going to [facility] coronary angiogram. He reports to be doing fair. Denies any chest pain or anginal equivalents. Reviewed Cardiology recommendations. Full hospitalist progress note to come in the AM.    Víctor Braxton, DO

## 2025-07-02 NOTE — ED NOTES
[facility]  ED Nurse Handoff Report    ED Chief complaint: Hypertension      ED Diagnosis:   Final diagnoses:   Hypertensive emergency   Elevated troponin       Code Status: To be addressed by admitting team    Allergies: No Known Allergies    Patient Story: Pt c/o HTN starting today. Denies pain. Hx stroke 1 year ago. Takes blood thinners. AOX4 and independent.        Triage Assessment (Adult)       Row Name 07/01/25 2242          Triage Assessment    Airway WDL WDL        Respiratory WDL    Respiratory WDL WDL        Skin Circulation/Temperature WDL    Skin Circulation/Temperature WDL WDL        Cardiac WDL    Cardiac WDL WDL        Peripheral/Neurovascular WDL    Peripheral Neurovascular WDL WDL        Cognitive/Neuro/Behavioral WDL    Cognitive/Neuro/Behavioral WDL WDL                     Focused Assessment: Monitor VS, ekg and laboratory.   Abnormal Labs Resulted from Time of ED Arrival to Time of ED Departure   COMPREHENSIVE METABOLIC PANEL - Abnormal       Result Value    Sodium 141      Potassium 4.2      Carbon Dioxide (CO2) 25      Anion Gap 14      Urea Nitrogen 22.3      Creatinine 0.70      GFR Estimate >90      Calcium 9.9      Chloride 102      Glucose 116 (*)     Alkaline Phosphatase 87      AST 14      ALT 14      Protein Total 8.7 (*)     Albumin 4.5      Bilirubin Total 0.7     TROPONIN T, HIGH SENSITIVITY - Abnormal    Troponin T, High Sensitivity 31 (*)    TROPONIN T, HIGH SENSITIVITY - Abnormal    Troponin T, High Sensitivity 40 (*)        No orders to display       Treatments and/or interventions provided:   Medications   aspirin (ASA) chewable tablet 162 mg (162 mg Oral $Given 7/2/25 7601)       Patient's response to treatments and/or interventions: Unchanged    To be done/followed up on inpatient unit:  Follow Plan of Care    Does this patient have any cognitive concerns?: A&Ox4    Activity level - Baseline/Home:  Independent  Activity Level - Current:   SBA    Patient's  Preferred language: English   Needed?: No    Isolation: Enteric  Infection: H/o c-diff 2/12/25  Sepsis treatment initiated: No  Patient tested for COVID 19 prior to admission: NO  Bariatric?: No    Vital Signs:   Vitals:    07/02/25 0300 07/02/25 0330 07/02/25 0400 07/02/25 0430   BP: (!) 158/85 (!) 170/94 (!) 171/100 (!) 178/96   Pulse: 63 74 64 72   Resp: 16 14 16 16   Temp:       TempSrc:       SpO2: 96% 96% 96% 94%   Weight:           Cardiac Rhythm: SR    Family Comments: None at Bedside  OBS brochure/video discussed/provided to patient/family: No    For the majority of the shift this patient's behavior was Green.   Behavioral interventions performed were None.    ED NURSE PHONE NUMBER: *[phone]

## 2025-07-02 NOTE — H&P
[facility]    History and Physical - Hospitalist Service       Date of Admission:  7/1/2025    Assessment & Plan      Cirilo Gardner is a 64 year old male admitted on 7/1/2025.   Cirilo Gardner is a 64 year old male with past medical history of hypertension, CVA with residual expressive aphasia, dyslipidemia, systolic cardiomyopathy with improved ejection fraction, LV apical thrombus, prediabetes who presented to ER for evaluation of uncontrolled blood pressures.    # Hypertensive emergency  # NSTEMI  - He presented with uncontrolled blood pressure for the last couple of weeks  - PTA meds needs to be verified by the pharmacy but apparently on Coreg 12.5 mg p.o. twice daily, Entresto 49-51 mg p.o. twice daily; also on Lasix  - He is compliant with his medications  - Initial SBP in , blood pressure improved initially without any intervention but then went up again to 177/107  - Denies chest pain, no shortness of breath, no palpitations  - Serial troponin trending up 9-31--40  -EKG with normal sinus rhythm, no ischemic changes  - Aspirin 162 mg p.o. given in ER  - Admit inpatient status  - Not started on heparin drip as he is on Eliquis-continue for now  - Start aspirin 81 mg p.o. daily  - Monitor on telemetry  - Serial troponins until trending down  - Resume PTA Coreg 12.5 mg p.o. twice daily and Entresto twice daily with holding parameters  - Hydralazine p.o./IV as needed for SBP more than 180s  - Monitor blood pressure and adjust medications for better blood pressure control  - Echocardiogram  - Cardiology consult  - N.p.o. for now in case that he would have angiogram (of note, he states that he is scheduled to have angiogram as outpatient in August 2025    # History of nonischemic cardiomyopathy with improved EF  # Hypertension  # Dyslipidemia  - During his admission for stroke in June 2024, echocardiogram showed low EF of 15-20%, with severe global hypokinesia of LV  and LV apex clot  - He was seen by cardiology and started on Coreg,Jardiance, Entresto, Lasix and spironolactone.  It was felt that his cardiomyopathy was nonischemic, CT calcium score on 6/18/2024 was 23.4.  Medical management was recommended.  He followed up with cardiology as outpatient.  Repeat echocardiogram in September 2024 showed improved EF to 50 to 55%, no LV thrombus seen.  - His PTA meds needs to be verified by the pharmacy but apparently he is not on spironolactone anymore, just on Coreg, Entresto, Jardiance and Lasix  - Resume PTA Coreg and Entresto as above  - Hold PTA Jardiance and Lasix for now in case that he will have angiogram  - He looks euvolemic at this time  - Echocardiogram  - Cardiology consult    # History of stroke  # History of LV thrombus-resolved  # Residual expressive aphasia  - Had an acute ischemic CVA of posterior left MCA in June 2024; CVA was thought to be secondary to LV thrombus  - She was started on Eliquis at that time  - Repeat echocardiogram in September 2024 showed no LV thrombus  - He follows with [facility] as outpatient; his speech much improved since his stroke but he still uses a tablet for easier communication at times.  - Resume PTA antihypertensive medications as above  - Resume PTA Eliquis  - Resume PTA statin    # Prediabetes  - Last hemoglobin A1c was 6 on 5/5/2025  - PTA on Jardiance-mainly for his history of CHF  - Repeat hemoglobin A1c  - Accu-Cheks every 4 hours while NPO  - ISS  - Hypoglycemia protocol        Diet:  npo  DVT Prophylaxis: DOAC  Ravi Catheter: Not present  Lines: None     Cardiac Monitoring: None  Code Status:  Full code, discussed with the patient    Clinically Significant Risk Factors Present on Admission                # Drug Induced Coagulation Defect: home medication list includes an anticoagulant medication    # Hypertension: Noted on problem list  # Heart failure with preserved ejection fraction: EF >50% and home medication list includes  sacubitril/valsartan (Entresto)                     Disposition Plan     Medically Ready for Discharge: Anticipated Tomorrow, pending cardiac workup            Genevieve Ch MD  Hospitalist Service  [facility]  Securely message with [employer] (more info)  Text page via [facility] Paging/Directory     ______________________________________________________________________    Chief Complaint   Uncontrolled blood pressures    History is obtained from the patient is a good historian.  I discussed with ER attending Dr. Gibbs and I reviewed his EMR.    History of Present Illness   Cirilo Gardner is a 64 year old male with past medical history of hypertension, CVA with residual expressive aphasia, dyslipidemia, systolic cardiomyopathy with improved ejection fraction, LV apical thrombus, prediabetes who presented to ER for evaluation of uncontrolled blood pressures.    The patient had an acute ischemic CVA of posterior left MCA presumed secondary to LV thrombus in June 2024.  He was started on Eliquis.  At the time echocardiogram showed a new diagnosis of cardiomyopathy with severe LV systolic dysfunction with EF of 15 to 20%.  He was seen by cardiology and started on Coreg,Jardiance, Entresto, Lasix and spironolactone.  It was felt that his cardiomyopathy was nonischemic, CT calcium score on 6/18/2024 was 23.4.  Medical management was recommended.  He followed up with cardiology and neurology as outpatient.  Repeat echocardiogram in September 2024 showed improved EF to 50 to 55%, no LV thrombus seen.  He reports that he is compliant with his medications.  He reports that for a long time his SBP had been around 110's, rarely up to 120s.  He states that for the last couple of weeks he noticed that his blood pressure became uncontrolled.  He monitors his blood pressure at home and more recently he had uncontrolled blood pressures with systolic blood pressure in 150s, sometimes up to 180s;  last afternoon his systolic blood pressure was 200s which is unusual for him.  He decided to come to ER for further evaluation  Upon further questioning he denies any headache, no nausea, no vomiting.  He denies chest pain, no shortness of breath, no palpitations.  He denies abdominal pain, no diarrhea, no constipation.  He has only mild lower extremity edema.  Apparently he is scheduled to have an angiogram in August 2025 as outpatient.  In ER he was seen by Dr. Gibbs.  Initial blood pressure was 222/133; pressures did improve to 147/94 without any interventions but later on up to 178/96.    CBC RESULTS:   Recent Labs   Lab Test 07/01/25  2326   WBC 8.2   RBC 4.77   HGB 14.6   HCT 44.1   MCV 93   MCH 30.6   MCHC 33.1   RDW 12.9         Last Comprehensive Metabolic Panel:  Lab Results   Component Value Date     07/01/2025    POTASSIUM 4.2 07/01/2025    CHLORIDE 102 07/01/2025    CO2 25 07/01/2025    ANIONGAP 14 07/01/2025     (H) 07/01/2025    BUN 22.3 07/01/2025    CR 0.70 07/01/2025    GFRESTIMATED >90 07/01/2025    PARISH 9.9 07/01/2025      Liver Function Studies -   Recent Labs   Lab Test 07/01/25 2326   PROTTOTAL 8.7*   ALBUMIN 4.5   BILITOTAL 0.7   ALKPHOS 87   AST 14   ALT 14      Troponin 9--31--40    EKG-shows normal sinus rhythm, no ischemic changes    In ER he was given aspirin 162 mg p.o.  He was not started on heparin drip as he is already on Eliquis.  Hospitalist service was called regarding admission.    Past Medical History    No past medical history on file.    Past Surgical History   Past Surgical History:   Procedure Laterality Date    TONSILLECTOMY         Prior to Admission Medications   Prior to Admission Medications   Prescriptions Last Dose Informant Patient Reported? Taking?   acetaminophen (TYLENOL) 500 MG tablet   Yes No   Sig: Take 1,000 mg by mouth 3 times daily. As needed   apixaban ANTICOAGULANT (ELIQUIS) 5 MG tablet   No No   Sig: Take 1 tablet (5 mg) by mouth  2 times daily   atorvastatin (LIPITOR) 40 MG tablet   No No   Sig: Take 1 tablet (40 mg) by mouth every evening   carvedilol (COREG) 12.5 MG tablet   No No   Sig: Take 1 tablet (12.5 mg) by mouth 2 times daily (with meals) HOLD If sbp <110 or HR <60   empagliflozin (JARDIANCE) 10 MG TABS tablet   No No   Sig: Take 1 tablet (10 mg) by mouth daily   levETIRAcetam (KEPPRA) 500 MG tablet   Yes No   Sig: Take 500 mg by mouth 2 times daily.   sacubitril-valsartan (ENTRESTO) 49-51 MG per tablet   No No   Sig: Take 1 tablet by mouth 2 times daily      Facility-Administered Medications: None        Review of Systems    The 10 point Review of Systems is negative other than noted in the HPI or here.     Social History   I have reviewed this patient's social history and updated it with pertinent information if needed.  Social History     Tobacco Use    Smoking status: Never    Smokeless tobacco: Never   Vaping Use    Vaping status: Never Used   Substance Use Topics    Alcohol use: Not Currently     Comment: not since stroke, used to have a 12 pack 5 times per week    Drug use: Not Currently         Allergies   No Known Allergies     Physical Exam   Vital Signs: Temp: 98.3  F (36.8  C) Temp src: Oral BP: (!) 178/96 Pulse: 72   Resp: 16 SpO2: 94 % O2 Device: None (Room air)    Weight: 220 lbs 0 oz    General Appearance: Awake/Alert, no acute distress, very pleasant  Respiratory: Bilateral air entry, no wheezing, no rales, no crackles  Cardiovascular: S1-S2, RRR, no murmurs, no rubs, trace edema bilateral lower extremities  GI: Abdomen is soft, nontender, bowel sounds are present  Skin: No rashes, no cyanosis  Neuro awake and alert, oriented x 3, residual expressive aphasia, no new focal neurological deficits    Medical Decision Making       75 MINUTES SPENT BY ME on the date of service doing chart review, history, exam, documentation & further activities per the note.      Data     I have personally reviewed the following data  over the past 24 hrs:    8.2  \   14.6   / 442     141 102 22.3 /  116 (H)   4.2 25 0.70 \     ALT: 14 AST: 14 AP: 87 TBILI: 0.7   ALB: 4.5 TOT PROTEIN: 8.7 (H) LIPASE: N/A     Trop: 40 (H) BNP: N/A       Imaging results reviewed over the past 24 hrs:   No results found for this or any previous visit (from the past 24 hours).

## 2025-07-02 NOTE — CONSULTS
[facility]    Cardiology Consultation     Date of Admission:  7/1/2025    Assessment & Plan   Cirilo Gardner is a 64 year old male who was admitted on 7/1/2025.    Hypertensive emergency.  Asymptomatic.  With recent increase in blood pressure etiology is not clear he is under more stress from work.  Does not take any significant amount of NSAIDs.  Reports normal sleep.  No recent over-the-counter supplements.  Recommend adding hydrochlorothiazide 25 mg daily.  Continue Entresto and carvedilol.  Heart rates at times in mid 50s so at this time would not increase carvedilol but that is another option to go up on the dose of carvedilol if heart rate mostly above 60s.  Mild troponin elevation.  Clinically this does not appear to be acute coronary syndrome.  EKG changes likely reflecting LVH and repolarization abnormalities.  He has not had any clear-cut CAD workup other than mildly abnormal calcium score last year.  Discussed options for further evaluation of CAD that includes traditional coronary angiogram versus coronary CT angiogram versus stress test.  Risk benefits do not favor traditional coronary angiogram at this time especially that he is on anticoagulation.  Also with the significant hypertension stress test is not optimal at this time.  After long discussion we made him ischii decision to do coronary CT angiogram.  Will try to see if we can do this while he is in the hospital.  History of cardiomyopathy with severe reduced LV systolic function was felt to be nonischemic with significant improvement in LVEF to around 50 to 55%.  Clinically does not appear to be in congestive heart failure.  On carvedilol and Entresto.  History of LV thrombus.  History of stroke.  On apixaban.    Primary cardiologist Dr. Sung    Recommendations  Continue carvedilol, Entresto, apixaban  Agree with echocardiogram.  Recommend adding hydrochlorothiazide 25 mg daily  If needed carvedilol dose can be  further increased as long as heart rate remains consistently above 60.  Coronary CT angiogram.  Anticipating this to be done most likely tomorrow depending upon availability.          Galo Bertrand MD, MD    Primary Care Physician   Physician No Ref-Primary    Reason for Consult   Reason for consult: I was asked to evaluate this patient for non-STEMI.    History of Present Illness   Cirilo Gardner is a 64 year old male who presents with elevated blood pressure.  He was asymptomatic but noted his blood pressure quite elevated with systolic in 180s to slightly above 200.  He has history of cardiomyopathy with severe reduced LV systolic function diagnosed last year felt to be nonischemic cardiomyopathy he underwent coronary CT angiogram but due to his recent stroke could not follow commands at that time and only calcium score was done which was mildly elevated 23.4.  Subsequently LVEF improved to around 50 to 85%.  He also had LV thrombus.  He has been on Entresto carvedilol and apixaban.  Patient does not blood pressure has been reasonably controlled till about a week ago and then go it started going up.  He denies any chest discomfort or shortness of breath.  Cardiology consulted because of possible non-STEMI.  EKG shows actually tall R wave in lead aVL with the ST-T changes likely related to repull abnormalities due to LVH.-Troponin increased from 9 to low 30s and now trending down.  He denies any significant NSAID consumption.  Reports sleep is normal.  Has been compliant with medications.  No exertional symptoms as such.  Heart rates usually in 60s sometimes and 50s.    Past Medical History   No past medical history on file.      Past Surgical History   Past Surgical History:   Procedure Laterality Date    TONSILLECTOMY           Prior to Admission Medications   Prior to Admission Medications   Prescriptions Last Dose Informant Patient Reported? Taking?   acetaminophen (TYLENOL) 500 MG tablet  Self Yes Yes    Sig: Take 1,000 mg by mouth 3 times daily. As needed   apixaban ANTICOAGULANT (ELIQUIS) 5 MG tablet 7/1/2025 Self No Yes   Sig: Take 1 tablet (5 mg) by mouth 2 times daily   atorvastatin (LIPITOR) 40 MG tablet 7/1/2025 Self No Yes   Sig: Take 1 tablet (40 mg) by mouth every evening   carvedilol (COREG) 12.5 MG tablet 7/1/2025 Self No Yes   Sig: Take 1 tablet (12.5 mg) by mouth 2 times daily (with meals) HOLD If sbp <110 or HR <60   empagliflozin (JARDIANCE) 10 MG TABS tablet 7/1/2025 Self No Yes   Sig: Take 1 tablet (10 mg) by mouth daily   fluticasone (FLONASE) 50 MCG/ACT nasal spray 7/1/2025 Self Yes Yes   Sig: Spray 1 spray into both nostrils daily.   sacubitril-valsartan (ENTRESTO) 49-51 MG per tablet 7/1/2025 Self No Yes   Sig: Take 1 tablet by mouth 2 times daily      Facility-Administered Medications: None     Current Facility-Administered Medications   Medication Dose Route Frequency Provider Last Rate Last Admin    apixaban ANTICOAGULANT (ELIQUIS) tablet 5 mg  5 mg Oral BID Genevieve Ch MD   5 mg at 07/02/25 0931    [START ON 7/3/2025] aspirin (ASA) chewable tablet 81 mg  81 mg Oral Daily Genevieve Ch MD        atorvastatin (LIPITOR) tablet 40 mg  40 mg Oral QPM Genevieve Ch MD        carvedilol (COREG) tablet 12.5 mg  12.5 mg Oral BID w/meals Genevieve Ch MD   12.5 mg at 07/02/25 0931    hydrALAZINE (APRESOLINE) tablet 10 mg  10 mg Oral Q4H PRN Genevieve Ch MD        Or    hydrALAZINE (APRESOLINE) injection 10 mg  10 mg Intravenous Q4H PRN Genevieve Ch MD        sacubitril-valsartan (ENTRESTO) 49-51 MG per tablet 1 tablet  1 tablet Oral BID Genevieve Ch MD   1 tablet at 07/02/25 0931     Current Outpatient Medications   Medication Sig Dispense Refill    acetaminophen (TYLENOL) 500 MG tablet Take 1,000 mg by mouth 3 times daily. As needed      apixaban ANTICOAGULANT (ELIQUIS) 5 MG tablet Take 1 tablet (5 mg) by mouth 2 times daily       atorvastatin (LIPITOR) 40 MG tablet Take 1 tablet (40 mg) by mouth every evening      carvedilol (COREG) 12.5 MG tablet Take 1 tablet (12.5 mg) by mouth 2 times daily (with meals) HOLD If sbp <110 or HR <60      empagliflozin (JARDIANCE) 10 MG TABS tablet Take 1 tablet (10 mg) by mouth daily      fluticasone (FLONASE) 50 MCG/ACT nasal spray Spray 1 spray into both nostrils daily.      sacubitril-valsartan (ENTRESTO) 49-51 MG per tablet Take 1 tablet by mouth 2 times daily       Current Facility-Administered Medications   Medication Dose Route Frequency Provider Last Rate Last Admin    apixaban ANTICOAGULANT (ELIQUIS) tablet 5 mg  5 mg Oral BID Genevieve Ch MD   5 mg at 07/02/25 0931    [START ON 7/3/2025] aspirin (ASA) chewable tablet 81 mg  81 mg Oral Daily Genevieve Ch MD        atorvastatin (LIPITOR) tablet 40 mg  40 mg Oral QPM Genevieve Ch MD        carvedilol (COREG) tablet 12.5 mg  12.5 mg Oral BID w/meals Genevieve Ch MD   12.5 mg at 07/02/25 0931    hydrALAZINE (APRESOLINE) tablet 10 mg  10 mg Oral Q4H PRN Genevieve Ch MD        Or    hydrALAZINE (APRESOLINE) injection 10 mg  10 mg Intravenous Q4H PRN Genevieve Ch MD        sacubitril-valsartan (ENTRESTO) 49-51 MG per tablet 1 tablet  1 tablet Oral BID Genevieve Ch MD   1 tablet at 07/02/25 0931     Current Outpatient Medications   Medication Sig Dispense Refill    acetaminophen (TYLENOL) 500 MG tablet Take 1,000 mg by mouth 3 times daily. As needed      apixaban ANTICOAGULANT (ELIQUIS) 5 MG tablet Take 1 tablet (5 mg) by mouth 2 times daily      atorvastatin (LIPITOR) 40 MG tablet Take 1 tablet (40 mg) by mouth every evening      carvedilol (COREG) 12.5 MG tablet Take 1 tablet (12.5 mg) by mouth 2 times daily (with meals) HOLD If sbp <110 or HR <60      empagliflozin (JARDIANCE) 10 MG TABS tablet Take 1 tablet (10 mg) by mouth daily      fluticasone (FLONASE) 50 MCG/ACT nasal spray Spray 1  "spray into both nostrils daily.      sacubitril-valsartan (ENTRESTO) 49-51 MG per tablet Take 1 tablet by mouth 2 times daily       Allergies   No Known Allergies    Social History    reports that he has never smoked. He has never used smokeless tobacco. He reports that he does not currently use alcohol. He reports that he does not currently use drugs.      Family History            Review of Systems   A comprehensive review of system was performed and is negative other than that noted in the HPI or here.     Physical Exam   Vital Signs with Ranges  Temp:  [98.3  F (36.8  C)] 98.3  F (36.8  C)  Pulse:  [58-90] 68  Resp:  [14-29] 29  BP: (144-222)/() 171/91  SpO2:  [94 %-99 %] 96 %  Wt Readings from Last 4 Encounters:   07/01/25 99.8 kg (220 lb)   05/07/25 99.9 kg (220 lb 3.2 oz)   12/05/24 99.2 kg (218 lb 12.8 oz)   10/30/24 99.3 kg (219 lb)     No intake/output data recorded.      Vitals: BP (!) 171/91   Pulse 68   Temp 98.3  F (36.8  C) (Oral)   Resp 29   Wt 99.8 kg (220 lb)   SpO2 96%   BMI 30.70 kg/m      Physical Exam:   General Patient appears comfortable  Neck normal JVP  Cardiovascular system S1-S2 normal no murmur rub or gallop  Respiratory some clear to auscultation  Extremities no edema    No lab results found in last 7 days.    Invalid input(s): \"TROPONINIES\"    Recent Labs   Lab 07/01/25  2326   WBC 8.2   HGB 14.6   MCV 93         POTASSIUM 4.2   CHLORIDE 102   CO2 25   BUN 22.3   CR 0.70   GFRESTIMATED >90   ANIONGAP 14   PARISH 9.9   *   ALBUMIN 4.5   PROTTOTAL 8.7*   BILITOTAL 0.7   ALKPHOS 87   ALT 14   AST 14     Recent Labs   Lab Test 07/17/24  0547 06/13/24  0742   CHOL 109 135   HDL  --  41   LDL  --  82   TRIG  --  58     Recent Labs   Lab 07/01/25  2326   WBC 8.2   HGB 14.6   HCT 44.1   MCV 93        No results for input(s): \"PH\", \"PHV\", \"PO2\", \"PO2V\", \"SAT\", \"PCO2\", \"PCO2V\", \"HCO3\", \"HCO3V\" in the last 168 hours.  No results for input(s): \"NTBNPI\", \"NTBNP\" " "in the last 168 hours.  No results for input(s): \"DD\" in the last 168 hours.  No results for input(s): \"SED\", \"CRP\" in the last 168 hours.  Recent Labs   Lab 07/01/25  2326        No results for input(s): \"TSH\" in the last 168 hours.  No results for input(s): \"COLOR\", \"APPEARANCE\", \"URINEGLC\", \"URINEBILI\", \"URINEKETONE\", \"SG\", \"UBLD\", \"URINEPH\", \"PROTEIN\", \"UROBILINOGEN\", \"NITRITE\", \"LEUKEST\", \"RBCU\", \"WBCU\" in the last 168 hours.    Imaging:  No results found for this or any previous visit (from the past 48 hours).    Echo:  No results found for this or any previous visit (from the past 4320 hours).    Clinically Significant Risk Factors Present on Admission                # Drug Induced Coagulation Defect: home medication list includes an anticoagulant medication    # Hypertension: Noted on problem list  # Heart failure with preserved ejection fraction: EF >50% and home medication list includes sacubitril/valsartan (Entresto)                                 "

## 2025-07-02 NOTE — ED TRIAGE NOTES
Pt c/o HTN starting today. Denies pain. Hx stroke 1 year ago. Takes blood thinners.      Triage Assessment (Adult)       Row Name 07/01/25 7887          Triage Assessment    Airway WDL WDL        Respiratory WDL    Respiratory WDL WDL        Skin Circulation/Temperature WDL    Skin Circulation/Temperature WDL WDL        Cardiac WDL    Cardiac WDL WDL        Peripheral/Neurovascular WDL    Peripheral Neurovascular WDL WDL        Cognitive/Neuro/Behavioral WDL    Cognitive/Neuro/Behavioral WDL WDL

## 2025-07-03 ENCOUNTER — APPOINTMENT (OUTPATIENT)
Dept: PHYSICAL THERAPY | Facility: CLINIC | Age: 65
DRG: 281 | End: 2025-07-03
Attending: INTERNAL MEDICINE
Payer: COMMERCIAL

## 2025-07-03 ENCOUNTER — APPOINTMENT (OUTPATIENT)
Dept: CARDIOLOGY | Facility: CLINIC | Age: 65
DRG: 281 | End: 2025-07-03
Attending: INTERNAL MEDICINE
Payer: COMMERCIAL

## 2025-07-03 VITALS
WEIGHT: 218.3 LBS | SYSTOLIC BLOOD PRESSURE: 93 MMHG | DIASTOLIC BLOOD PRESSURE: 60 MMHG | OXYGEN SATURATION: 96 % | HEIGHT: 71 IN | BODY MASS INDEX: 30.56 KG/M2 | HEART RATE: 76 BPM | TEMPERATURE: 98.2 F | RESPIRATION RATE: 18 BRPM

## 2025-07-03 LAB
ANION GAP SERPL CALCULATED.3IONS-SCNC: 13 MMOL/L (ref 7–15)
BUN SERPL-MCNC: 22.4 MG/DL (ref 8–23)
CALCIUM SERPL-MCNC: 9.7 MG/DL (ref 8.8–10.4)
CHLORIDE SERPL-SCNC: 99 MMOL/L (ref 98–107)
CHOLEST SERPL-MCNC: 112 MG/DL
CREAT SERPL-MCNC: 0.93 MG/DL (ref 0.67–1.17)
EGFRCR SERPLBLD CKD-EPI 2021: >90 ML/MIN/1.73M2
ERYTHROCYTE [DISTWIDTH] IN BLOOD BY AUTOMATED COUNT: 12.9 % (ref 10–15)
GLUCOSE BLDC GLUCOMTR-MCNC: 109 MG/DL (ref 70–99)
GLUCOSE BLDC GLUCOMTR-MCNC: 121 MG/DL (ref 70–99)
GLUCOSE BLDC GLUCOMTR-MCNC: 88 MG/DL (ref 70–99)
GLUCOSE SERPL-MCNC: 109 MG/DL (ref 70–99)
HCO3 SERPL-SCNC: 23 MMOL/L (ref 22–29)
HCT VFR BLD AUTO: 40.1 % (ref 40–53)
HDLC SERPL-MCNC: 52 MG/DL
HGB BLD-MCNC: 13.7 G/DL (ref 13.3–17.7)
LDLC SERPL CALC-MCNC: 52 MG/DL
LVEF ECHO: NORMAL
MCH RBC QN AUTO: 30.4 PG (ref 26.5–33)
MCHC RBC AUTO-ENTMCNC: 34.2 G/DL (ref 31.5–36.5)
MCV RBC AUTO: 89 FL (ref 78–100)
NONHDLC SERPL-MCNC: 60 MG/DL
PLATELET # BLD AUTO: 422 10E3/UL (ref 150–450)
POTASSIUM SERPL-SCNC: 3.7 MMOL/L (ref 3.4–5.3)
RBC # BLD AUTO: 4.5 10E6/UL (ref 4.4–5.9)
SODIUM SERPL-SCNC: 135 MMOL/L (ref 135–145)
TRIGL SERPL-MCNC: 40 MG/DL
WBC # BLD AUTO: 8 10E3/UL (ref 4–11)

## 2025-07-03 PROCEDURE — 80048 BASIC METABOLIC PNL TOTAL CA: CPT | Performed by: INTERNAL MEDICINE

## 2025-07-03 PROCEDURE — 250N000013 HC RX MED GY IP 250 OP 250 PS 637: Performed by: INTERNAL MEDICINE

## 2025-07-03 PROCEDURE — 97161 PT EVAL LOW COMPLEX 20 MIN: CPT | Mod: GP

## 2025-07-03 PROCEDURE — 255N000002 HC RX 255 OP 636: Performed by: INTERNAL MEDICINE

## 2025-07-03 PROCEDURE — 97530 THERAPEUTIC ACTIVITIES: CPT | Mod: GP

## 2025-07-03 PROCEDURE — 97110 THERAPEUTIC EXERCISES: CPT | Mod: GP

## 2025-07-03 PROCEDURE — C8929 TTE W OR WO FOL WCON,DOPPLER: HCPCS

## 2025-07-03 PROCEDURE — 250N000013 HC RX MED GY IP 250 OP 250 PS 637

## 2025-07-03 PROCEDURE — 85027 COMPLETE CBC AUTOMATED: CPT | Performed by: STUDENT IN AN ORGANIZED HEALTH CARE EDUCATION/TRAINING PROGRAM

## 2025-07-03 PROCEDURE — 93306 TTE W/DOPPLER COMPLETE: CPT | Mod: 26 | Performed by: INTERNAL MEDICINE

## 2025-07-03 PROCEDURE — 99233 SBSQ HOSP IP/OBS HIGH 50: CPT | Mod: 25

## 2025-07-03 PROCEDURE — 82310 ASSAY OF CALCIUM: CPT | Performed by: INTERNAL MEDICINE

## 2025-07-03 PROCEDURE — 99239 HOSP IP/OBS DSCHRG MGMT >30: CPT | Performed by: STUDENT IN AN ORGANIZED HEALTH CARE EDUCATION/TRAINING PROGRAM

## 2025-07-03 PROCEDURE — 80061 LIPID PANEL: CPT | Performed by: INTERNAL MEDICINE

## 2025-07-03 PROCEDURE — 36415 COLL VENOUS BLD VENIPUNCTURE: CPT | Performed by: INTERNAL MEDICINE

## 2025-07-03 RX ORDER — HYDROCHLOROTHIAZIDE 25 MG/1
25 TABLET ORAL DAILY
Qty: 30 TABLET | Refills: 0 | Status: CANCELLED | OUTPATIENT
Start: 2025-07-04

## 2025-07-03 RX ORDER — ASPIRIN 81 MG/1
81 TABLET ORAL DAILY
Qty: 60 TABLET | Refills: 0 | Status: SHIPPED | OUTPATIENT
Start: 2025-07-03

## 2025-07-03 RX ORDER — HYDROCHLOROTHIAZIDE 12.5 MG/1
12.5 TABLET ORAL DAILY
Status: DISCONTINUED | OUTPATIENT
Start: 2025-07-04 | End: 2025-07-03 | Stop reason: HOSPADM

## 2025-07-03 RX ORDER — HYDROCHLOROTHIAZIDE 12.5 MG/1
12.5 TABLET ORAL DAILY
Qty: 30 TABLET | Refills: 0 | Status: SHIPPED | OUTPATIENT
Start: 2025-07-04

## 2025-07-03 RX ADMIN — APIXABAN 5 MG: 5 TABLET, FILM COATED ORAL at 10:12

## 2025-07-03 RX ADMIN — CARVEDILOL 12.5 MG: 12.5 TABLET, FILM COATED ORAL at 10:11

## 2025-07-03 RX ADMIN — PERFLUTREN 2 ML (DILUTED): 6.52 INJECTION, SUSPENSION INTRAVENOUS at 10:16

## 2025-07-03 RX ADMIN — EMPAGLIFLOZIN 10 MG: 10 TABLET, FILM COATED ORAL at 10:12

## 2025-07-03 RX ADMIN — HYDROCHLOROTHIAZIDE 25 MG: 25 TABLET ORAL at 10:12

## 2025-07-03 RX ADMIN — SACUBITRIL AND VALSARTAN 1 TABLET: 49; 51 TABLET, FILM COATED ORAL at 10:11

## 2025-07-03 ASSESSMENT — ACTIVITIES OF DAILY LIVING (ADL)
ADLS_ACUITY_SCORE: 38

## 2025-07-03 NOTE — PROGRESS NOTES
[facility]    ~Cardiology Progress Note~    Primary Cardiologist: Dr. Sung     Date of Admission: 7/1/2025  Service Date: 07/03/2025    Summary:  Mr. Cirilo Gardner is a very pleasant 64 year old male with a past medical history of HTN, CVA (residual aphasia), dyslipidemia, systolic cardiomyopathy with recovered ejection fraction, LV apical thrombus (on Eliquis), prediabetes who was admitted on 7/1/2025 for uncontrolled blood pressure.     Interval History:  No acute events overnight. Eager to discharge. Denies chest pain and dyspnea. Reviewed CT coronary angiogram results.     Telemetry reviewed- sinus rhythm          Assessment:     Hypertensive urgency, asymptomatic, resolved   On entresto and carvedilol PTA   Improvement in BP after adding hydrochlorothiazide    Troponin elevation, secondary to #1  Peaked at 40  Chest pain free     Non obstructive coronary artery disease, mild, noted on CT coronary angiogram 7/2025  Chest pain free      Hx of cardiomyopathy with severely reduced LV systolic function   Echo 6/2024 LVEF 15-20%, improved to 50-55% on echo 9/2024  Updated echo ordered  Compensated     4.   Hx of LV thrombus and CVA, 6/2024  On Eliquis          Plan:     Will hold on coronary angiogram. Okay to resume PTA jardiance   Stop ASA   Decrease hydrochlorothiazide to 12.5 mg daily   Continue eliquis, atorvastatin, carvedilol, entresto   Agree with echocardiogram. Results pending. If echo is without acute changes, patient cleared to discharge from cardiology standpoint   Follow up with cardiology ANDREW on 7/31  Cardiology to sign off       Plan of care was formulated under the direction and guidance of Dr. Bertrand .         Ashley Yao PA-C  Physician Assistant   [facility]      Patient Active Problem List   Diagnosis    Elevated troponin    Visual field cut    H/O Lt MCA embolic infarction    Severe left ventricular systolic dysfunction (LVSD)    LV (left  ventricular) mural thrombus    Occlusion and stenosis of left vertebral artery    History of left watershed infarction    Expressive aphasia syndrome    Hypertensive emergency       Physical Exam   Temp: 97.8  F (36.6  C) Temp src: Oral BP: 135/80 Pulse: 60   Resp: 18 SpO2: 96 % O2 Device: None (Room air)    Vitals:    07/01/25 2249 07/03/25 0600   Weight: 99.8 kg (220 lb) 99 kg (218 lb 4.8 oz)     I/O last 3 completed shifts:  In: 480 [P.O.:480]  Out: -     Constitutional: Appears stated age, well nourished, NAD.  Neck: Supple.   Respiratory: Non-labored. Lungs CTAB.  Cardiovascular: RRR, normal S1 and S2. No M/G/R. Bilateral lower extremities with no edema.   GI: Soft, non-distended, non-tender.  Skin: Warm and dry.   Musculoskeletal/Extremities: Symmetrical movement.  Neurologic: No gross focal deficits. Alert, awake.  Psychiatric: Affect appropriate. Mentation normal.    Medications   Current Facility-Administered Medications   Medication Dose Route Frequency Provider Last Rate Last Admin    Continuing ACE inhibitor/ARB/ARNI from home medication list OR ACE inhibitor/ARB order already placed during this visit   Does not apply DOES NOT GO TO Genevieve Urias MD        Continuing beta blocker from home medication list OR beta blocker order already placed during this visit   Does not apply DOES NOT GO TO Genevieve Urias MD        Continuing statin from home medication list OR statin order already placed during this visit   Does not apply DOES NOT GO TO Genevieve Urias MD        medication instruction   Does not apply Continuous PRN Genevieve Ch MD        Patient is already receiving anticoagulation with heparin, enoxaparin (LOVENOX), warfarin (COUMADIN)  or other anticoagulant medication   Does not apply Continuous PRN Genevieve Ch MD         Current Facility-Administered Medications   Medication Dose Route Frequency Provider Last Rate Last Admin    apixaban  ANTICOAGULANT (ELIQUIS) tablet 5 mg  5 mg Oral BID Genevieve Ch MD   5 mg at 07/02/25 2117    aspirin (ASA) chewable tablet 81 mg  81 mg Oral Daily Genevieve Ch MD        atorvastatin (LIPITOR) tablet 40 mg  40 mg Oral QPM Genevieve Ch MD   40 mg at 07/02/25 2118    carvedilol (COREG) tablet 12.5 mg  12.5 mg Oral BID w/meals Genevieve Ch MD   12.5 mg at 07/02/25 1828    [Held by provider] empagliflozin (JARDIANCE) tablet 10 mg  10 mg Oral Daily Genevieve Ch MD        hydrochlorothiazide (HYDRODIURIL) tablet 25 mg  25 mg Oral Daily Galo Bertrand MD   25 mg at 07/02/25 1208    insulin aspart (NovoLOG) injection (RAPID ACTING)  1-7 Units Subcutaneous Q4H Genevieve Ch MD   1 Units at 07/02/25 2219    sacubitril-valsartan (ENTRESTO) 49-51 MG per tablet 1 tablet  1 tablet Oral BID Genevieve Ch MD   1 tablet at 07/02/25 2117    sodium chloride (PF) 0.9% PF flush 3 mL  3 mL Intracatheter Q8H [facility] Genevieve Ch MD   3 mL at 07/02/25 2120       Data   Last 24 hours labs personally reviewed.  Echo: No results found for this or any previous visit (from the past 4320 hours).

## 2025-07-03 NOTE — PLAN OF CARE
Care plan note:      Recent Vitals:  Temp: 97.8  F (36.6  C) Temp src: Oral BP: 102/65 Pulse: 67   Resp: 16 SpO2: 97 % O2 Device: None (Room air)      Orientation/Neuro: Alert and Oriented x 4  Pain: The patient is not having any pain.   Tele: Sinus bradycardia   IV medications: None   Mobility: up Independently   Skin: With in normal limits   Resp: RA  GI: WDL  : WDL     Diet: Tolerating diet:   Well  Orders Placed This Encounter      Low Saturated Fat Na <2400 mg      Safety/Concerns:  None  Aggression Color: Green    Plan: Echo, medication adjustments, probable discharge tomorrow.    Continue to monitor.      Richmond Mendiola RN

## 2025-07-03 NOTE — DISCHARGE SUMMARY
"[facility]  Hospitalist Discharge Summary      Date of Admission:  7/1/2025  Date of Discharge:  7/3/2025  Discharging Provider: Víctor Braxton DO  Discharge Service: Hospitalist Service    Discharge Diagnoses   Hypertensive emergency  NSTEMI  History of nonischemic cardiomyopathy with improved EF  Hypertension  Dyslipidemia  History of stroke  History of LV thrombus-resolved  Residual expressive aphasia  Prediabetes    Clinically Significant Risk Factors     # Obesity: Estimated body mass index is 30.45 kg/m  as calculated from the following:    Height as of this encounter: 1.803 m (5' 11\").    Weight as of this encounter: 99 kg (218 lb 4.8 oz).       Follow-ups Needed After Discharge      - Cardiology follow up  - PCP follow up     Unresulted Labs Ordered in the Past 30 Days of this Admission       No orders found from 6/1/2025 to 7/2/2025.          Discharge Disposition   Discharged to home  Condition at discharge: Stable    Hospital Course     Cirilo Gardner is a 64 year old male admitted on 7/1/2025 for high blood pressure and NSTEMI. He has a past medical history of hypertension, CVA with residual expressive aphasia, dyslipidemia, systolic cardiomyopathy with improved ejection fraction, LV apical thrombus, and prediabetes. Patient discharged home on 7/3.      Hypertensive emergency  NSTEMI  He presented with uncontrolled blood pressure for the last couple of weeks. PTA meds include Coreg 12.5 mg p.o. twice daily, Entresto 49-51 mg p.o. twice daily; also on empagliflozin 10 mg daily. He is compliant with his medications. Initial SBP in , blood pressure improved initially without any intervention but then went up again to 177/107. Denies chest pain, no shortness of breath, no palpitations. Serial troponin trending up 5-57-56-32-22. EKG with normal sinus rhythm, no ischemic changes. CTA angiogram coronary showed mild non-obstructive CAD. No longer has LV thrombus. TTE " showed recovered EF. Discussed with Dr. Bertrand of cardiology prior to discharge. The patient was previously placed on a DOAC due to history of LV thrombus and embolic CVA. The patient no longer has an LV thrombus. He does not need anti-coagulation from a cardiac perspective. We agreed it would be appropriate to stop the patient's PTA DOAC and switch to 81 mg aspirin daily for primary stroke prevention.     - Stopping DOAC given resolution of LV thombus  - Starting aspirin 81 mg daily for primary stroke prevention on discharge  - PTA Coreg 12.5 mg p.o. twice daily and Entresto twice daily continued   - Continued PTA SGLT2i on discharge  - Cardiology consulted, recommendations appreciated    - Started hydrochlorothiazide 25 mg daily, reduced to 12.5 mg daily prior to discharge due to lower normal end blood pressures.    - Plan for outpatient cardiology follow up.      History of nonischemic cardiomyopathy with improved EF  Hypertension  Dyslipidemia  During his admission for stroke in June 2024, echocardiogram showed low EF of 15-20%, with severe global hypokinesia of LV and LV apex clot. He was seen by cardiology and started on Coreg,Jardiance, Entresto, Lasix and spironolactone.  It was felt that his cardiomyopathy was nonischemic, CT calcium score on 6/18/2024 was 23.4.  Medical management was recommended.  He followed up with cardiology as outpatient.  Repeat echocardiogram in September 2024 showed improved EF to 50 to 55%, no LV thrombus seen.     - Resume PTA Coreg and Entresto as above  - Jardiance initially held on admission, resumed 7/3  - Cardiology consulted as above       History of stroke  History of LV thrombus-resolved  Residual expressive aphasia  Had an acute ischemic CVA of posterior left MCA in June 2024; CVA was thought to be secondary to LV thrombus. Was started on eliquis at that time. Repeat echocardiogram in September 2024 showed no LV thrombus. He follows with [facility] as outpatient; his speech much  improved since his stroke but he still uses a tablet for easier communication at times.  - PTA Eliquis stopped since no longer has LV thrombus, started on 81 mg aspirin daily for primary stroke prevention   - PTA statin     Prediabetes  Last hemoglobin A1c was 6 on 5/5/2025. PTA on Jardiance-mainly for his history of CHF  - Continue Jardiance on discharge      Consultations This Hospital Stay   CARDIOLOGY IP CONSULT  CARDIAC REHAB IP CONSULT  SMOKING CESSATION PROGRAM IP CONSULT    Code Status   Full Code    Time Spent on this Encounter   I, Víctor Braxton DO, personally saw the patient today and spent greater than 30 minutes discharging this patient.       Víctor Braxton DO  [facility] HEART CARE  [address], [address]  [facility] [address]  Phone: [phone]  ______________________________________________________________________    Physical Exam   Vital Signs: Temp: 98.2  F (36.8  C) Temp src: Oral BP: 93/60 Pulse: 76   Resp: 18 SpO2: 96 % O2 Device: None (Room air)    Weight: 218 lbs 4.8 oz    Constitutional: awake, alert, cooperative, no apparent distress, and appears stated age  Eyes: Lids and lashes normal, pupils equal, round and reactive to light   Respiratory: No increased work of breathing, good air exchange, clear to auscultation bilaterally, no crackles or wheezing  Cardiovascular: Normal apical impulse, regular rate and rhythm, normal S1 and S2, no S3 or S4, and no murmur noted  GI: No scars, normal bowel sounds, soft, non-distended, non-tender, no masses palpated, no hepatosplenomegally  Skin: No rashes   Musculoskeletal: No deformity   Neurologic: Expressive aphasia. Able to communicate while using his I-pad for assistance. Alert and oriented   Neuropsychiatric: General: normal, calm, and normal eye contact       Primary Care Physician   Physician No Ref-Primary    Discharge Orders      Follow-Up with Cardiology ANDREW      Reason for your hospital stay    -  Uncontrolled hyertension     Activity    Your activity upon discharge: activity as tolerated     NO Follow-up Care Needed    NO follow-up care needed for this patient.     Discharge Instructions    - Return for recurrent uncontrolled high blood pressure with SBP > 180  - Return if you develop dizziness, lightheadedness, chest pain, nausea, vomiting, diaphoresis  - Follow up with your primary care provider in one week for repeat blood test (BMP) with the addition of hydrochlorothiazide  - You no longer need to be on apixiban because of resolution of LV thrombus. You should remain on a baby aspirin 81 mg daily for primary stroke prevention.     Diet    Follow this diet upon discharge: Current Diet:Orders Placed This Encounter      Combination Diet Moderate Consistent Carb (60 g CHO per Meal) Diet; Low Saturated Fat Na <2400mg Diet       Significant Results and Procedures   Most Recent 3 CBC's:  Recent Labs   Lab Test 07/03/25  0619 07/01/25 2326 07/17/24  0547   WBC 8.0 8.2 4.2   HGB 13.7 14.6 16.2   MCV 89 93 94    442 360     Most Recent 3 BMP's:  Recent Labs   Lab Test 07/03/25  1106 07/03/25  0619 07/03/25  0612 07/02/25  1653 07/02/25  1132 07/01/25 2326 07/17/24  0547   NA  --  135  --   --   --  141 144   POTASSIUM  --  3.7  --   --   --  4.2 3.1*   CHLORIDE  --  99  --   --   --  102 104   CO2  --  23  --   --   --  25 26   BUN  --  22.4  --   --   --  22.3 16.0   CR  --  0.93  --   --  0.7 0.70 0.82   ANIONGAP  --  13  --   --   --  14 14   PARISH  --  9.7  --   --   --  9.9 10.7*   * 109* 109*   < >  --  116* 100*    < > = values in this interval not displayed.     Most Recent 2 LFT's:  Recent Labs   Lab Test 07/01/25  2326   AST 14   ALT 14   ALKPHOS 87   BILITOTAL 0.7     Most Recent 3 INR's:  Recent Labs   Lab Test 06/19/24  0819 06/18/24  0755 06/17/24  1808   INR 1.41* 1.28* 1.19*   ,   Results for orders placed or performed during the hospital encounter of 07/01/25   Radiologist Consult  For Cardiology    Narrative    OVERREAD: DETAILED [facility] EXTRACARDIAC OVERREAD OF CARDIAC CT   LOCATION: [facility]  DATE: 2025    INDICATION: None provided  TECHNIQUE: Dose reduction techniques were used.  COMPARISON: 2024    FINDINGS:    LIMITED CHEST: Negative.  LIMITED MEDIASTINUM: Negative.  LIMITED UPPER ABDOMEN: Negative.  LIMITED MUSCULOSKELETAL: Negative.      Impression    IMPRESSION:    1.  No significant incidental extracardiac findings.  2.  Please refer to cardiologist's dictation for the cardiac CT report.   Echocardiogram Complete     Value    LVEF  60-65%    Narrative    [id number]  [id number]  [id number]  [id number]^NICCI^SONDRA^JEZ     [facility]  Echocardiography Laboratory  [address] [place] [address]     Name: LIMA GILL  MRN: [id number]  : 1960  Study Date: 2025 09:53 AM  Age: 64 yrs  Gender: Male  Patient Location: [facility]  Reason For Study: Hypertension (HTN)  Ordering Physician: SONDRA GRAJEDA  Referring Physician: No Ref-Primary, Physician  Performed By: Yandel Hernadez     BSA: 2.2 m2  Height: 71 in  Weight: 218 lb  HR: 63  BP: 171/93 mmHg  ______________________________________________________________________________  Procedure  Echocardiogram with two-dimensional, color and spectral Doppler. Definity (NDC  #[id number]) given intravenously.  ______________________________________________________________________________  Interpretation Summary     Left ventricular systolic function is normal.  The visual ejection fraction is 60-65%.  The right ventricle is normal in structure, function and size.  No significant valve dysfunction.  The inferior vena cava was normal in size with preserved respiratory  variability.  The aortic root is normal size.  There is no pericardial effusion.     Compared to study dated 2024, left and right ventricular function  has  improved.  ______________________________________________________________________________  Left Ventricle  The left ventricle is normal in structure, function and size. Left ventricular  systolic function is normal. The visual ejection fraction is 60-65%. Left  ventricular diastolic function is indeterminate. Normal left ventricular wall  motion.     Right Ventricle  The right ventricle is normal in structure, function and size.     Atria  The left atrium is mildly dilated. Right atrial size is normal.     Mitral Valve  There is mild mitral annular calcification. There is trace mitral  regurgitation.     Tricuspid Valve  The tricuspid valve is normal in structure and function. Right ventricle  systolic pressure estimate normal. There is trace tricuspid regurgitation.     Aortic Valve  The aortic valve is trileaflet.     Pulmonic Valve  The pulmonic valve is normal in structure and function.     Vessels  The aortic root is normal size. Normal size ascending aorta. The inferior vena  cava was normal in size with preserved respiratory variability.     Pericardium  There is no pericardial effusion.     ______________________________________________________________________________  MMode/2D Measurements & Calculations  IVSd: 1.0 cm  LVIDd: 5.5 cm  LVIDs: 4.1 cm  LVPWd: 1.0 cm  FS: 25.5 %  LV mass(C)d: 213.2 grams  LV mass(C)dI: 97.4 grams/m2     Ao root diam: 3.8 cm  asc Aorta Diam: 3.5 cm  Ao root diam index Ht(cm/m): 2.1  Ao root diam index BSA (cm/m2): 1.7  Asc Ao diam index BSA (cm/m2): 1.6  Asc Ao diam index Ht(cm/m): 1.9  EF Biplane: 64.9 %  LA Volume (BP): 99.4 ml  LA Volume Index (BP): 45.4 ml/m2  RV Base: 3.6 cm     RWT: 0.36  TAPSE: 2.7 cm     Doppler Measurements & Calculations  MV E max hermelindo: 62.9 cm/sec  MV A max hermelindo: 94.3 cm/sec  MV E/A: 0.67  MV dec time: 0.27 sec  Ao V2 max: 168.0 cm/sec  Ao max P.0 mmHg  Ao V2 mean: 112.0 cm/sec  Ao mean P.0 mmHg  Ao V2 VTI: 35.7 cm  LV V1 max P.5  mmHg  LV V1 max: 117.0 cm/sec  LV V1 VTI: 26.8 cm  PA acc time: 0.13 sec  AV Tomi Ratio (DI): 0.70  E/E' av.6  Lateral E/e': 14.4  Medial E/e': 12.8  RV S Tomi: 20.1 cm/sec     ______________________________________________________________________________  Report approved by: Kay Raygoza MD on 2025 12:06 PM         CTA  ANGIOGRAM CORONARY ARTERY    Narrative    Procedure: CT ANGIO CORONARY ARTERY   Examination Date:     Indication:  Troponin elevation.     Clinical Information: chest pain, abnormal stress echo, 105cc optiray  350 ,(wasted  45cc)   Ordering Physician: Dr Bertrand     Overall quality of the study: Adequate.     PROCEDURE:The patient was positioned in the scanner gantry and an IV  was started using an 18 gauge IV in the right antecubital fossa.   Utilizing 120 cc  Isovue 370, wasted 0 cc, multi-slice computed  tomography was performed with a Siemens Dual Source Flash scanner  without incident. Beta-blockers were required to optimize heart rate,  patient was given Metoprolol 50 mg Oral, Ivabradine 10 mg po. The  patient was given pre-medication of sublingual Nitrostat 0.4 mg prior  to scanning. Coronary artery calcium score was performed using the  Flash scanner protocol. CTA was performed in the flash and spiral mode  at a heart rate of 52 bpm with 100 kVp. Images were reconstructed and  analyzed on a [employer] workstation. Scan protocol was optimized to  minimize radiation exposure. The total radiation exposure including  calcium score was calculated to be 333 DLP, and 5.38 mSv.      Impression    IMPRESSION:    1. Total Agatston score 30.9, placing the patient in the 38 percentile  when compared to age and gender matched control group.    2. Mild nonobstructive coronary artery disease    3.  Please review Radiology report for incidental noncardiac findings  that  will follow separately.        FINDINGS:    CORONARY CALCIUM SCORE: The total Agatston calcium score is 30.9, Left  main: 0,  left anterior descendin.9,  circumflex: 0, right  coronary artery: 0. This places the patient in the 38th percentile  when compared to age and gender matched control group. Troponin    CORONARY CT ANGIOGRAPHY    DOMINANCE: Right dominant system.     LEFT MAIN: The left main arises normally from the left coronary cusp  and is widely patent without any stenosis or plaque.    LEFT ANTERIOR DESCENDING: There is a very mild stenosis in the  proximal LAD from the presence of a focal mixed plaque. There is also  a mild proximal stenosis in a large branching diagonal artery from  presence of a small calcified plaque. The remainder of this vessel  together with its other diagonal branches are free of significant  obstructive disease..      CIRCUMFLEX: The circumflex and its major branches are widely patent  without any detectable stenosis or plaque.    RIGHT CORONARY ARTERY: The right coronary artery and its major  branches are widely patent without any detectable stenosis or plaque.    ADDITIONAL FINDINGS:     The aortic root is mildly dilated measuring 4.0 cm.    Normal pulmonary venous anatomy with all four pulmonary veins draining  into the left atrium.      There is no left ventricular mass or thrombus.     Normal pericardial thickness. There is no pericardial effusion.    The proximal pulmonary arteries are well opacified.    Please review Radiology report for incidental noncardiac findings that  will follow separately.    THIERRY MAO MD         SYSTEM ID:  [id number]       Discharge Medications      Review of your medicines        START taking        Dose / Directions   aspirin 81 MG EC tablet  Used for: H/O Lt MCA embolic infarction      Dose: 81 mg  Take 1 tablet (81 mg) by mouth daily.  Quantity: 60 tablet  Refills: 0     hydroCHLOROthiazide 12.5 MG tablet      Dose: 12.5 mg  Start taking on: 2025  Take 1 tablet (12.5 mg) by mouth daily.  Quantity: 30 tablet  Refills: 0            CONTINUE these medicines  which have NOT CHANGED        Dose / Directions   acetaminophen 500 MG tablet  Commonly known as: TYLENOL      Dose: 1,000 mg  Take 1,000 mg by mouth 3 times daily. As needed  Refills: 0     atorvastatin 40 MG tablet  Commonly known as: LIPITOR  Used for: Cardiomyopathy, unspecified type (H)      Dose: 40 mg  Take 1 tablet (40 mg) by mouth every evening  Refills: 0     carvedilol 12.5 MG tablet  Commonly known as: COREG  Used for: Cardiomyopathy, unspecified type (H)      Dose: 12.5 mg  Take 1 tablet (12.5 mg) by mouth 2 times daily (with meals) HOLD If sbp <110 or HR <60  Refills: 0     empagliflozin 10 MG Tabs tablet  Commonly known as: JARDIANCE  Used for: Cardiomyopathy, unspecified type (H)      Dose: 10 mg  Take 1 tablet (10 mg) by mouth daily  Refills: 0     Entresto 49-51 MG per tablet  Used for: Cardiomyopathy, unspecified type (H)  Generic drug: sacubitril-valsartan      Dose: 1 tablet  Take 1 tablet by mouth 2 times daily  Refills: 0     fluticasone 50 MCG/ACT nasal spray  Commonly known as: FLONASE      Dose: 1 spray  Spray 1 spray into both nostrils daily.  Refills: 0            STOP taking      apixaban ANTICOAGULANT 5 MG tablet  Commonly known as: ELIQUIS                  Where to get your medicines        These medications were sent to [facility] [address] [address]  [address] [address] [address]      Phone: [phone]   aspirin 81 MG EC tablet  hydroCHLOROthiazide 12.5 MG tablet       Allergies   No Known Allergies

## 2025-07-03 NOTE — PROGRESS NOTES
07/03/25 0900   Appointment Info   Signing Clinician's Name / Credentials (PT) Carina Rivera, MIKKI   Living Environment   People in Home alone   Current Living Arrangements house   Home Accessibility stairs within home   Number of Stairs, Within Home, Primary ten   Stair Railings, Within Home, Primary railings safe and in good condition;railing on right side (ascending)   Transportation Anticipated car, drives self   Self-Care   Usual Activity Tolerance excellent   Current Activity Tolerance excellent   Regular Exercise Yes   Activity/Exercise Type walking   Exercise Amount/Frequency daily   Equipment Currently Used at Home none   Fall history within last six months no   General Information   Onset of Illness/Injury or Date of Surgery 07/01/25   Referring Physician Dr. Ch   Patient/Family Therapy Goals Statement (PT) To go home   Pertinent History of Current Problem (include personal factors and/or comorbidities that impact the POC) Pt is a 64 year old male admitted with hypertensive emergency.   Cognition   Affect/Mental Status (Cognition) WFL   Orientation Status (Cognition) oriented x 4   Follows Commands (Cognition) WFL   Pain Assessment   Patient Currently in Pain No   Range of Motion (ROM)   Range of Motion ROM is WFL   Strength (Manual Muscle Testing)   Strength (Manual Muscle Testing) strength is WFL   Bed Mobility   Bed Mobility no deficits identified   Transfers   Transfers no deficits identified   Gait/Stairs (Locomotion)   [place] Level (Gait) independent   Balance   Balance Comments Good   Clinical Impression   Criteria for Skilled Therapeutic Intervention Yes, treatment indicated   PT Diagnosis (PT) Impaired endurance   Influenced by the following impairments Decreased endurance   Functional limitations due to impairments Difficulty with long distance ambulation   Clinical Presentation (PT Evaluation Complexity) stable   Clinical Presentation Rationale Clinical Judgement   Clinical Decision  Making (Complexity) low complexity   Planned Therapy Interventions (PT) patient/family education;progressive activity/exercise;risk factor education;home program guidelines   Risk & Benefits of therapy have been explained evaluation/treatment results reviewed;care plan/treatment goals reviewed;risks/benefits reviewed;current/potential barriers reviewed;participants voiced agreement with care plan;participants included;patient   PT Total Evaluation Time   PT Eval, Low Complexity Minutes (52841) 10   Physical Therapy Goals   PT Frequency One time eval and treatment only   PT Predicted Duration/Target Date for Goal Attainment 07/03/25   PT Goals Cardiac Phase 1   PT: Understanding of cardiac education to maximize quality of life, condition management, and health outcomes Patient;Verbalize   PT: Perform aerobic activity with stable cardiovascular response continuous;ambulation;15 minutes   PT: Functional/aerobic ambulation tolerance with stable cardiovascular response in order to return to home and community environment Independent;Greater than 300 feet;Goal Met   PT: Navigation of stairs simulating home set up with stable cardiovascular response in order to return to home and community environment Modified independent;Greater than 10 stairs;Rail on right;Goal Met   PT Discharge Planning   PT Plan Progress ambulation distance, review cardiac education   PT Discharge Recommendation (DC Rec) home   PT Rationale for DC Rec Pt independent with mobility and safe to discharge home. Pt would benefit from continuing home walking program and following BP guidelines, verbalizes understanding of signs/symptoms of activity intolerance.   PT Brief overview of current status Goals of therapy will be to address safe mobility and make recs for d/c to next level of care. Pt and RN will continue to follow all falls risk precautions as documented by RN staff while hospitalized. Independent.   PT Total Distance Amb During Session (feet)  1500

## 2025-07-03 NOTE — PLAN OF CARE
Goal Outcome Evaluation:    Pt. Discharged per order to home. Discharge instructions/AVS reviewed with pt. Pt. Verbalizes understanding of instructions. Discharge medications sent with pt.

## 2025-07-03 NOTE — PLAN OF CARE
VSS, no c/o pain, up with cardiac rehab, echo pending, possible discharge if echo is ok, continue to monitor closely.

## 2025-07-03 NOTE — PLAN OF CARE
VSS, no c/o pain. Up ad ej in the room, plan for echo and possible medication adjustments, probable discharge tomorrow.

## 2025-07-31 ENCOUNTER — OFFICE VISIT (OUTPATIENT)
Dept: CARDIOLOGY | Facility: CLINIC | Age: 65
End: 2025-07-31
Payer: COMMERCIAL

## 2025-07-31 VITALS
DIASTOLIC BLOOD PRESSURE: 87 MMHG | HEIGHT: 71 IN | SYSTOLIC BLOOD PRESSURE: 149 MMHG | BODY MASS INDEX: 31.67 KG/M2 | HEART RATE: 70 BPM | OXYGEN SATURATION: 96 % | WEIGHT: 226.2 LBS

## 2025-07-31 DIAGNOSIS — I42.9 CARDIOMYOPATHY, UNSPECIFIED TYPE (H): Primary | ICD-10-CM

## 2025-07-31 DIAGNOSIS — I16.1 HYPERTENSIVE EMERGENCY: ICD-10-CM

## 2025-07-31 NOTE — LETTER
7/31/2025    Physician No Ref-Primary  No address on file    RE: Cirilo Gardner       Dear Colleague,     I had the pleasure of seeing Cirilo Gardner in the [facility].          ~Cardiology Clinic Visit~    Primary Cardiologist: Dr. Sung  Reason for visit: Hospital follow-up      Cirilo Gardner MRN# [id number]   YOB: 1960 Age: 64 year old       HPI:    Cirilo Gardner is a delightful 64 year old patient with past medical history significant for:    Nonischemic cardiomyopathy 15-20% in 6/2024 improved to 50-55% in 9/2024. EF 60-65% on 7/3/2025   Mild nonobstructive coronary artery disease on coronary CT angiogram 7/2/2025  History of LV apical thrombus, resolved  Hypertension  Prediabetes  Prior CVA with expressive aphasia  Obesity    I had the opportunity to see Cirilo Gardner for cardiology follow up.  In review, the patient follows with Dr. Sung and was last seen by him on 12/5/2024 and at that time he reported feeling well from a cardiovascular standpoint.     Following this the patient was recently hospitalized to [facility] 7/2 - 7-3/2025 for hypertensive emergency.  Cardiology was consulted for NSTEMI.  Patient came to the emergency department when he noted his blood pressure was quite elevated with systolic 180s-200.  He was asymptomatic from a cardiac standpoint. Troponin at that time increased from 9 to 40.  He underwent a CT coronary angiogram on 7/2/25 which showed mild nonobstructive coronary artery disease.  Total calcium score 30.9 placing the patient in the 38th percentile when compared to age and gender matched control group.    Echocardiogram from 7/3/25 showed LVEF 60-65%, normal RV structure function and size, no significant valve disease.  Given recovery of LV function and no LV thrombus apixaban was discontinued.  The patient was continued on aspirin 81 mg for secondary CAD prophylaxis.  His blood pressure was  subsequently well-controlled on carvedilol, Entresto, and hydrochlorothiazide.    Mr. Gardner returns for posthospital follow-up.  Since hospital discharge he reports feeling well from a cardiovascular standpoint and denies any chest pain or shortness of breath. No lower extremity swelling, orthopnea, or PND. No lightheadedness, dizziness, or syncope. No palpitations or racing heart.     He tells me that his pharmacy ran out of Entresto and he is waiting for them to call him to let him know it has arrived.  He has been off Entresto for the past 3 days and not surprising his blood pressure is slightly elevated today at 149/87.    Lab work 7/15/25: Cr 0.95/89, K 5, Na 141, ALT 14, hgb 14.1/plts 489. Lipid panel 7/3/2025: cholesterol 112, triglycerides 40, HDL 52, and LDL 52      Diagnotic studies:  Echocardiogram 7/3/2025:  Left ventricular systolic function is normal.  The visual ejection fraction is 60-65%.  The right ventricle is normal in structure, function and size.  No significant valve dysfunction.  The inferior vena cava was normal in size with preserved respiratory variability.  The aortic root is normal size.  There is no pericardial effusion.     Compared to study dated 9/23/2024, left and right ventricular function has improved.  CTA coronary artery 7/2/2025:  1. Total Agatston score 30.9, placing the patient in the 38 percentile  when compared to age and gender matched control group.     2. Mild nonobstructive coronary artery disease     3.  Please review Radiology report for incidental noncardiac findings  that  will follow separately.        Assessment:  Nonischemic cardiomyopathy 15-20% in 6/2024 improved to 50-55% in 9/2024. EF 60-65% on 7/3/2025   GDMT: carvedilol 12.5 mg BID, Jardiance 10 mg, Entresto 49-51 mg BID  Appears euvolemic on exam  Mild nonobstructive coronary artery disease on coronary CT angiogram 7/2/2025  Aspirin, atorvastatin  History of LV apical thrombus, resolved  Hypertension-  sub-optimally controlled however off Entresto for 3 days due pharmacy supply issue  Prediabetes  Prior CVA with expressive aphasia  Obesity      Plan:   It was my pleasure to see Mr. Gardner for cardiology follow up today. Overall, he reports feeling from a cardiovascular standpoint denies any anginal, heart failure, or arrhythmia symptoms.  We reviewed his recent testing in the hospital including coronary CT angiogram which shows mild nonobstructive coronary disease in addition to echocardiogram which shows normalization of LV function and no LV thrombus.  Continue guideline directed medical therapy including carvedilol 12.5 mg twice daily, Jardiance 10 mg, and Entresto 49-51 mg twice daily.  Blood pressure above goal today however patient ran out of Entresto on Monday and due to a pharmacy supply issue he has not been able to receive it.  He will call them this afternoon.  For now would continue with the addition of hydrochlorothiazide 12.5 mg.  I have asked that he update me if pharmacy is unable to supply Entresto in the next couple of days we could bridge with losartan.  Continue atorvastatin 40 mg and aspirin 81 mg daily  Follow-up with me in 6 months with a BMP at that time, sooner if any new concerns    Thank you for the opportunity to participate in this pleasant patient's care.    We would be happy to see this patient sooner for any concerns in the meantime.    DEBRA Lemos, CNP   Nurse Practitioner  [facility]    A total of 40 minutes was spent with patient, on chart review and documentation today.         Orders Placed This Encounter   Procedures     Basic metabolic panel     Follow-Up with Cardiology ANDREW     No orders of the defined types were placed in this encounter.    There are no discontinued medications.      Encounter Diagnoses   Name Primary?     Hypertensive emergency      Cardiomyopathy, unspecified type (H) Yes       CURRENT MEDICATIONS:  Current Outpatient  Medications   Medication Sig Dispense Refill     acetaminophen (TYLENOL) 500 MG tablet Take 1,000 mg by mouth 3 times daily. As needed       aspirin 81 MG EC tablet Take 1 tablet (81 mg) by mouth daily. 60 tablet 0     atorvastatin (LIPITOR) 40 MG tablet Take 1 tablet (40 mg) by mouth every evening       carvedilol (COREG) 12.5 MG tablet Take 1 tablet (12.5 mg) by mouth 2 times daily (with meals) HOLD If sbp <110 or HR <60       empagliflozin (JARDIANCE) 10 MG TABS tablet Take 1 tablet (10 mg) by mouth daily       fluticasone (FLONASE) 50 MCG/ACT nasal spray Spray 1 spray into both nostrils daily.       hydroCHLOROthiazide 12.5 MG tablet Take 1 tablet (12.5 mg) by mouth daily. 30 tablet 0     sacubitril-valsartan (ENTRESTO) 49-51 MG per tablet Take 1 tablet by mouth 2 times daily         ALLERGIES   No Known Allergies    PAST MEDICAL HISTORY:  No past medical history on file.    PAST SURGICAL HISTORY:  Past Surgical History:   Procedure Laterality Date     TONSILLECTOMY         FAMILY HISTORY:  No family history on file.    SOCIAL HISTORY:  Social History     Socioeconomic History     Marital status: Single     Spouse name: None     Number of children: None     Years of education: None     Highest education level: None   Tobacco Use     Smoking status: Never     Smokeless tobacco: Never   Vaping Use     Vaping status: Never Used   Substance and Sexual Activity     Alcohol use: Not Currently     Comment: not since stroke, used to have a 12 pack 5 times per week     Drug use: Not Currently     Social Drivers of Health     Financial Resource Strain: Low Risk  (7/2/2025)    Financial Resource Strain      Within the past 12 months, have you or your family members you live with been unable to get utilities (heat, electricity) when it was really needed?: No   Food Insecurity: Low Risk  (7/2/2025)    Food Insecurity      Within the past 12 months, did you worry that your food would run out before you got money to buy more?:  "No      Within the past 12 months, did the food you bought just not last and you didn t have money to get more?: No   Transportation Needs: Low Risk  (7/2/2025)    Transportation Needs      Within the past 12 months, has lack of transportation kept you from medical appointments, getting your medicines, non-medical meetings or appointments, work, or from getting things that you need?: No   Social Connections: Socially Integrated (1/24/2025)    Received from [facility]    Social Connections      Do you often feel lonely or isolated from those around you?: 0   Interpersonal Safety: Low Risk  (7/2/2025)    Interpersonal Safety      Do you feel physically and emotionally safe where you currently live?: Yes      Within the past 12 months, have you been hit, slapped, kicked or otherwise physically hurt by someone?: No      Within the past 12 months, have you been humiliated or emotionally abused in other ways by your partner or ex-partner?: No   Housing Stability: Low Risk  (7/2/2025)    Housing Stability      Do you have housing? : Yes      Are you worried about losing your housing?: No       Review of Systems:  Skin:        Eyes:       ENT:       Respiratory:       Cardiovascular:       Gastroenterology:      Genitourinary:       Musculoskeletal:       Neurologic:       Psychiatric:       Heme/Lymph/Imm:       Endocrine:         Physical Exam:    Vitals: BP (!) 149/87 (BP Location: Left arm, Patient Position: Sitting)   Pulse 70   Ht 1.803 m (5' 10.98\")   Wt 102.6 kg (226 lb 3.2 oz)   SpO2 96%   BMI 31.57 kg/m    Constitutional: Well nourished and in no apparent distress.  Neck: Supple.  Respiratory: Breathing non-labored. Lungs clear to auscultation bilaterally. No crackles, wheezes, rhonchi, or rales.  Cardiovascular:  Regular rate and rhythm, normal S1 and S2. No murmur, rub, or gallop.  Skin: Warm, dry.   Extremities: No edema.  Neurologic: No gross motor deficits. Alert, awake, " and oriented to person, place and time.   Psychiatric: Affect appropriate.        Recent Lab Results:  LIPID RESULTS:  Lab Results   Component Value Date    CHOL 112 07/03/2025    HDL 52 07/03/2025    LDL 52 07/03/2025    TRIG 40 07/03/2025       LIVER ENZYME RESULTS:  Lab Results   Component Value Date    AST 14 07/01/2025    ALT 14 07/01/2025       CBC RESULTS:  Lab Results   Component Value Date    WBC 8.0 07/03/2025    RBC 4.50 07/03/2025    HGB 13.7 07/03/2025    HCT 40.1 07/03/2025    MCV 89 07/03/2025    MCH 30.4 07/03/2025    MCHC 34.2 07/03/2025    RDW 12.9 07/03/2025     07/03/2025       BMP RESULTS:  Lab Results   Component Value Date     07/03/2025    POTASSIUM 3.7 07/03/2025    CHLORIDE 99 07/03/2025    CO2 23 07/03/2025    ANIONGAP 13 07/03/2025     (H) 07/03/2025     (H) 07/03/2025    BUN 22.4 07/03/2025    CR 0.93 07/03/2025    GFRESTIMATED >90 07/03/2025    GFRESTIMATED >60 07/02/2025    PARISH 9.7 07/03/2025        A1C RESULTS:  Lab Results   Component Value Date    A1C 6.2 (H) 07/01/2025       INR RESULTS:  Lab Results   Component Value Date    INR 1.41 (H) 06/19/2024    INR 1.28 (H) 06/18/2024           CC  Ashley Yao PA-C  [id number] [id number]                  Thank you for allowing me to participate in the care of your patient.      Sincerely,     DEBRA Lemos [facility]  cc:   Ashley Yao PA-C  [id number] [id number]

## 2025-07-31 NOTE — PROGRESS NOTES
~Cardiology Clinic Visit~    Primary Cardiologist: Dr. Sung  Reason for visit: Hospital follow-up      Cirilo Gardner MRN# [id number]   YOB: 1960 Age: 64 year old       HPI:    Cirilo Gardner is a delightful 64 year old patient with past medical history significant for:    Nonischemic cardiomyopathy 15-20% in 6/2024 improved to 50-55% in 9/2024. EF 60-65% on 7/3/2025   Mild nonobstructive coronary artery disease on coronary CT angiogram 7/2/2025  History of LV apical thrombus, resolved  Hypertension  Prediabetes  Prior CVA with expressive aphasia  Obesity    I had the opportunity to see Cirilo Gardner for cardiology follow up.  In review, the patient follows with Dr. Sung and was last seen by him on 12/5/2024 and at that time he reported feeling well from a cardiovascular standpoint.     Following this the patient was recently hospitalized to [facility] 7/2 - 7-3/2025 for hypertensive emergency.  Cardiology was consulted for NSTEMI.  Patient came to the emergency department when he noted his blood pressure was quite elevated with systolic 180s-200.  He was asymptomatic from a cardiac standpoint. Troponin at that time increased from 9 to 40.  He underwent a CT coronary angiogram on 7/2/25 which showed mild nonobstructive coronary artery disease.  Total calcium score 30.9 placing the patient in the 38th percentile when compared to age and gender matched control group.    Echocardiogram from 7/3/25 showed LVEF 60-65%, normal RV structure function and size, no significant valve disease.  Given recovery of LV function and no LV thrombus apixaban was discontinued.  The patient was continued on aspirin 81 mg for secondary CAD prophylaxis.  His blood pressure was subsequently well-controlled on carvedilol, Entresto, and hydrochlorothiazide.    Mr. Gardner returns for posthospital follow-up.  Since hospital discharge he reports feeling well from a cardiovascular  standpoint and denies any chest pain or shortness of breath. No lower extremity swelling, orthopnea, or PND. No lightheadedness, dizziness, or syncope. No palpitations or racing heart.     He tells me that his pharmacy ran out of Entresto and he is waiting for them to call him to let him know it has arrived.  He has been off Entresto for the past 3 days and not surprising his blood pressure is slightly elevated today at 149/87.    Lab work 7/15/25: Cr 0.95/89, K 5, Na 141, ALT 14, hgb 14.1/plts 489. Lipid panel 7/3/2025: cholesterol 112, triglycerides 40, HDL 52, and LDL 52      Diagnotic studies:  Echocardiogram 7/3/2025:  Left ventricular systolic function is normal.  The visual ejection fraction is 60-65%.  The right ventricle is normal in structure, function and size.  No significant valve dysfunction.  The inferior vena cava was normal in size with preserved respiratory variability.  The aortic root is normal size.  There is no pericardial effusion.     Compared to study dated 9/23/2024, left and right ventricular function has improved.  CTA coronary artery 7/2/2025:  1. Total Agatston score 30.9, placing the patient in the 38 percentile  when compared to age and gender matched control group.     2. Mild nonobstructive coronary artery disease     3.  Please review Radiology report for incidental noncardiac findings  that  will follow separately.        Assessment:  Nonischemic cardiomyopathy 15-20% in 6/2024 improved to 50-55% in 9/2024. EF 60-65% on 7/3/2025   GDMT: carvedilol 12.5 mg BID, Jardiance 10 mg, Entresto 49-51 mg BID  Appears euvolemic on exam  Mild nonobstructive coronary artery disease on coronary CT angiogram 7/2/2025  Aspirin, atorvastatin  History of LV apical thrombus, resolved  Hypertension- sub-optimally controlled however off Entresto for 3 days due pharmacy supply issue  Prediabetes  Prior CVA with expressive aphasia  Obesity      Plan:   It was my pleasure to see Mr. Gardner for cardiology  follow up today. Overall, he reports feeling from a cardiovascular standpoint denies any anginal, heart failure, or arrhythmia symptoms.  We reviewed his recent testing in the hospital including coronary CT angiogram which shows mild nonobstructive coronary disease in addition to echocardiogram which shows normalization of LV function and no LV thrombus.  Continue guideline directed medical therapy including carvedilol 12.5 mg twice daily, Jardiance 10 mg, and Entresto 49-51 mg twice daily.  Blood pressure above goal today however patient ran out of Entresto on Monday and due to a pharmacy supply issue he has not been able to receive it.  He will call them this afternoon.  For now would continue with the addition of hydrochlorothiazide 12.5 mg.  I have asked that he update me if pharmacy is unable to supply Entresto in the next couple of days we could bridge with losartan.  Continue atorvastatin 40 mg and aspirin 81 mg daily  Follow-up with me in 6 months with a BMP at that time, sooner if any new concerns    Thank you for the opportunity to participate in this pleasant patient's care.    We would be happy to see this patient sooner for any concerns in the meantime.    DEBRA Lemos, CNP   Nurse Practitioner  [facility]    A total of 40 minutes was spent with patient, on chart review and documentation today.         Orders Placed This Encounter   Procedures    Basic metabolic panel    Follow-Up with Cardiology ANDREW     No orders of the defined types were placed in this encounter.    There are no discontinued medications.      Encounter Diagnoses   Name Primary?    Hypertensive emergency     Cardiomyopathy, unspecified type (H) Yes       CURRENT MEDICATIONS:  Current Outpatient Medications   Medication Sig Dispense Refill    acetaminophen (TYLENOL) 500 MG tablet Take 1,000 mg by mouth 3 times daily. As needed      aspirin 81 MG EC tablet Take 1 tablet (81 mg) by mouth daily. 60 tablet 0     atorvastatin (LIPITOR) 40 MG tablet Take 1 tablet (40 mg) by mouth every evening      carvedilol (COREG) 12.5 MG tablet Take 1 tablet (12.5 mg) by mouth 2 times daily (with meals) HOLD If sbp <110 or HR <60      empagliflozin (JARDIANCE) 10 MG TABS tablet Take 1 tablet (10 mg) by mouth daily      fluticasone (FLONASE) 50 MCG/ACT nasal spray Spray 1 spray into both nostrils daily.      hydroCHLOROthiazide 12.5 MG tablet Take 1 tablet (12.5 mg) by mouth daily. 30 tablet 0    sacubitril-valsartan (ENTRESTO) 49-51 MG per tablet Take 1 tablet by mouth 2 times daily         ALLERGIES   No Known Allergies    PAST MEDICAL HISTORY:  No past medical history on file.    PAST SURGICAL HISTORY:  Past Surgical History:   Procedure Laterality Date    TONSILLECTOMY         FAMILY HISTORY:  No family history on file.    SOCIAL HISTORY:  Social History     Socioeconomic History    Marital status: Single     Spouse name: None    Number of children: None    Years of education: None    Highest education level: None   Tobacco Use    Smoking status: Never    Smokeless tobacco: Never   Vaping Use    Vaping status: Never Used   Substance and Sexual Activity    Alcohol use: Not Currently     Comment: not since stroke, used to have a 12 pack 5 times per week    Drug use: Not Currently     Social Drivers of Health     Financial Resource Strain: Low Risk  (7/2/2025)    Financial Resource Strain     Within the past 12 months, have you or your family members you live with been unable to get utilities (heat, electricity) when it was really needed?: No   Food Insecurity: Low Risk  (7/2/2025)    Food Insecurity     Within the past 12 months, did you worry that your food would run out before you got money to buy more?: No     Within the past 12 months, did the food you bought just not last and you didn t have money to get more?: No   Transportation Needs: Low Risk  (7/2/2025)    Transportation Needs     Within the past 12 months, has lack of  "transportation kept you from medical appointments, getting your medicines, non-medical meetings or appointments, work, or from getting things that you need?: No   Social Connections: Socially Integrated (1/24/2025)    Received from [facility] [employer] & [facility]    Social Connections     Do you often feel lonely or isolated from those around you?: 0   Interpersonal Safety: Low Risk  (7/2/2025)    Interpersonal Safety     Do you feel physically and emotionally safe where you currently live?: Yes     Within the past 12 months, have you been hit, slapped, kicked or otherwise physically hurt by someone?: No     Within the past 12 months, have you been humiliated or emotionally abused in other ways by your partner or ex-partner?: No   Housing Stability: Low Risk  (7/2/2025)    Housing Stability     Do you have housing? : Yes     Are you worried about losing your housing?: No       Review of Systems:  Skin:        Eyes:       ENT:       Respiratory:       Cardiovascular:       Gastroenterology:      Genitourinary:       Musculoskeletal:       Neurologic:       Psychiatric:       Heme/Lymph/Imm:       Endocrine:         Physical Exam:    Vitals: BP (!) 149/87 (BP Location: Left arm, Patient Position: Sitting)   Pulse 70   Ht 1.803 m (5' 10.98\")   Wt 102.6 kg (226 lb 3.2 oz)   SpO2 96%   BMI 31.57 kg/m    Constitutional: Well nourished and in no apparent distress.  Neck: Supple.  Respiratory: Breathing non-labored. Lungs clear to auscultation bilaterally. No crackles, wheezes, rhonchi, or rales.  Cardiovascular:  Regular rate and rhythm, normal S1 and S2. No murmur, rub, or gallop.  Skin: Warm, dry.   Extremities: No edema.  Neurologic: No gross motor deficits. Alert, awake, and oriented to person, place and time.   Psychiatric: Affect appropriate.        Recent Lab Results:  LIPID RESULTS:  Lab Results   Component Value Date    CHOL 112 07/03/2025    HDL 52 07/03/2025    LDL 52 07/03/2025    TRIG 40 " 07/03/2025       LIVER ENZYME RESULTS:  Lab Results   Component Value Date    AST 14 07/01/2025    ALT 14 07/01/2025       CBC RESULTS:  Lab Results   Component Value Date    WBC 8.0 07/03/2025    RBC 4.50 07/03/2025    HGB 13.7 07/03/2025    HCT 40.1 07/03/2025    MCV 89 07/03/2025    MCH 30.4 07/03/2025    MCHC 34.2 07/03/2025    RDW 12.9 07/03/2025     07/03/2025       BMP RESULTS:  Lab Results   Component Value Date     07/03/2025    POTASSIUM 3.7 07/03/2025    CHLORIDE 99 07/03/2025    CO2 23 07/03/2025    ANIONGAP 13 07/03/2025     (H) 07/03/2025     (H) 07/03/2025    BUN 22.4 07/03/2025    CR 0.93 07/03/2025    GFRESTIMATED >90 07/03/2025    GFRESTIMATED >60 07/02/2025    PARISH 9.7 07/03/2025        A1C RESULTS:  Lab Results   Component Value Date    A1C 6.2 (H) 07/01/2025       INR RESULTS:  Lab Results   Component Value Date    INR 1.41 (H) 06/19/2024    INR 1.28 (H) 06/18/2024           SYDNIE Yao, PA-C  [address] [place] [place] [address]

## 2025-07-31 NOTE — PATIENT INSTRUCTIONS
Thank you for your visit with the [facility] today.    Today's Summary:    No changes to medications today. Continue the carvedilol, Entresto, and hydrochlorothiazide at current doses.  If you Madeline has not arrived by the weekend- give me a call and we can send over a substitute until the Entresto arrives  Continue the aspirin 81 mg   Your cholesterol looks well controlled- no changes needed  The echocardiogram done during the hospitalization shows the heart pump function has returned to normal.     Follow-up:  Cardiology follow up at [facility]: 6 months with me, labs ahead of time.     Cardiology Scheduling ~[phone]  Cardiology Clinic RN ~ [phone]    It was a pleasure seeing you today.     DEBRA Lemos, CNP  Certified Nurse Practitioner  [facility]  July 31, 2025  ________________________________________________________

## 2025-08-24 ENCOUNTER — THERAPY VISIT (OUTPATIENT)
Dept: SLEEP MEDICINE | Facility: CLINIC | Age: 65
End: 2025-08-24
Payer: COMMERCIAL

## 2025-08-24 DIAGNOSIS — G47.34 NOCTURNAL HYPOXEMIA: ICD-10-CM

## 2025-08-24 DIAGNOSIS — G47.33 OSA (OBSTRUCTIVE SLEEP APNEA): ICD-10-CM

## 2025-08-24 ASSESSMENT — SLEEP AND FATIGUE QUESTIONNAIRES
HOW LIKELY ARE YOU TO NOD OFF OR FALL ASLEEP WHILE SITTING QUIETLY AFTER LUNCH WITHOUT ALCOHOL: WOULD NEVER DOZE
HOW LIKELY ARE YOU TO NOD OFF OR FALL ASLEEP WHILE LYING DOWN TO REST IN THE AFTERNOON WHEN CIRCUMSTANCES PERMIT: SLIGHT CHANCE OF DOZING
HOW LIKELY ARE YOU TO NOD OFF OR FALL ASLEEP IN A CAR, WHILE STOPPED FOR A FEW MINUTES IN TRAFFIC: WOULD NEVER DOZE
HOW LIKELY ARE YOU TO NOD OFF OR FALL ASLEEP WHILE SITTING AND READING: SLIGHT CHANCE OF DOZING
HOW LIKELY ARE YOU TO NOD OFF OR FALL ASLEEP WHILE WATCHING TV: SLIGHT CHANCE OF DOZING
HOW LIKELY ARE YOU TO NOD OFF OR FALL ASLEEP WHILE SITTING AND TALKING TO SOMEONE: WOULD NEVER DOZE
HOW LIKELY ARE YOU TO NOD OFF OR FALL ASLEEP WHILE SITTING INACTIVE IN A PUBLIC PLACE: WOULD NEVER DOZE
HOW LIKELY ARE YOU TO NOD OFF OR FALL ASLEEP WHEN YOU ARE A PASSENGER IN A CAR FOR AN HOUR WITHOUT A BREAK: WOULD NEVER DOZE

## 2025-08-27 LAB — SLPCOMP: NORMAL

## 2025-09-03 LAB — SLPCOMP: NORMAL

## (undated) RX ORDER — IVABRADINE 5 MG/1
TABLET, FILM COATED ORAL
Status: DISPENSED
Start: 2024-06-18

## (undated) RX ORDER — METOPROLOL TARTRATE 50 MG
TABLET ORAL
Status: DISPENSED
Start: 2024-06-18

## (undated) RX ORDER — IVABRADINE 5 MG/1
TABLET, FILM COATED ORAL
Status: DISPENSED
Start: 2025-07-02

## (undated) RX ORDER — METOPROLOL TARTRATE 50 MG
TABLET ORAL
Status: DISPENSED
Start: 2025-07-02